# Patient Record
Sex: FEMALE | Race: WHITE | Employment: OTHER | URBAN - METROPOLITAN AREA
[De-identification: names, ages, dates, MRNs, and addresses within clinical notes are randomized per-mention and may not be internally consistent; named-entity substitution may affect disease eponyms.]

---

## 2017-02-16 ENCOUNTER — TELEPHONE (OUTPATIENT)
Dept: CARDIOLOGY CLINIC | Age: 77
End: 2017-02-16

## 2017-05-04 ENCOUNTER — NURSE ONLY (OUTPATIENT)
Dept: CARDIOLOGY CLINIC | Age: 77
End: 2017-05-04

## 2017-05-04 ENCOUNTER — OFFICE VISIT (OUTPATIENT)
Dept: CARDIOLOGY CLINIC | Age: 77
End: 2017-05-04

## 2017-05-04 ENCOUNTER — PROCEDURE VISIT (OUTPATIENT)
Dept: CARDIOLOGY CLINIC | Age: 77
End: 2017-05-04

## 2017-05-04 VITALS
HEIGHT: 63 IN | SYSTOLIC BLOOD PRESSURE: 128 MMHG | WEIGHT: 197 LBS | DIASTOLIC BLOOD PRESSURE: 78 MMHG | HEART RATE: 80 BPM | BODY MASS INDEX: 34.91 KG/M2 | RESPIRATION RATE: 16 BRPM

## 2017-05-04 DIAGNOSIS — Z45.09 ENCOUNTER FOR LOOP RECORDER CHECK: ICD-10-CM

## 2017-05-04 DIAGNOSIS — I63.9 ISCHEMIC STROKE (HCC): Primary | ICD-10-CM

## 2017-05-04 DIAGNOSIS — G45.9 TRANSIENT CEREBRAL ISCHEMIA, UNSPECIFIED TYPE: Primary | ICD-10-CM

## 2017-05-04 DIAGNOSIS — I63.9 ISCHEMIC STROKE (HCC): ICD-10-CM

## 2017-05-04 PROCEDURE — 99215 OFFICE O/P EST HI 40 MIN: CPT | Performed by: INTERNAL MEDICINE

## 2017-05-04 PROCEDURE — 93291 INTERROG DEV EVAL SCRMS IP: CPT | Performed by: INTERNAL MEDICINE

## 2017-05-24 ENCOUNTER — TELEPHONE (OUTPATIENT)
Dept: CARDIOLOGY CLINIC | Age: 77
End: 2017-05-24

## 2017-05-26 ENCOUNTER — HOSPITAL ENCOUNTER (OUTPATIENT)
Dept: CARDIAC CATH/INVASIVE PROCEDURES | Age: 77
Discharge: HOME | End: 2017-05-26
Attending: INTERNAL MEDICINE | Admitting: INTERNAL MEDICINE

## 2017-05-26 VITALS — WEIGHT: 194 LBS | BODY MASS INDEX: 32.32 KG/M2 | HEIGHT: 65 IN

## 2017-05-26 LAB
BASOPHILS ABSOLUTE: 0.1 K/UL (ref 0–0.2)
BASOPHILS RELATIVE PERCENT: 1.1 %
EOSINOPHILS ABSOLUTE: 0.3 K/UL (ref 0–0.6)
EOSINOPHILS RELATIVE PERCENT: 3.2 %
HCT VFR BLD CALC: 39.6 % (ref 36–48)
HEMOGLOBIN: 13.2 G/DL (ref 12–16)
LYMPHOCYTES ABSOLUTE: 1.7 K/UL (ref 1–5.1)
LYMPHOCYTES RELATIVE PERCENT: 18.9 %
MCH RBC QN AUTO: 29.8 PG (ref 26–34)
MCHC RBC AUTO-ENTMCNC: 33.3 G/DL (ref 31–36)
MCV RBC AUTO: 89.4 FL (ref 80–100)
MONOCYTES ABSOLUTE: 0.9 K/UL (ref 0–1.3)
MONOCYTES RELATIVE PERCENT: 9.6 %
NEUTROPHILS ABSOLUTE: 6 K/UL (ref 1.7–7.7)
NEUTROPHILS RELATIVE PERCENT: 67.2 %
PDW BLD-RTO: 13.5 % (ref 12.4–15.4)
PLATELET # BLD: 181 K/UL (ref 135–450)
PMV BLD AUTO: 9.5 FL (ref 5–10.5)
RBC # BLD: 4.43 M/UL (ref 4–5.2)
WBC # BLD: 8.9 K/UL (ref 4–11)

## 2017-05-26 PROCEDURE — 33284 PR RMVL IMPLANTABLE PT-ACTIVATED CAR EVENT RECORDER: CPT | Performed by: INTERNAL MEDICINE

## 2017-06-05 ENCOUNTER — TELEPHONE (OUTPATIENT)
Dept: CARDIOLOGY CLINIC | Age: 77
End: 2017-06-05

## 2017-06-07 ENCOUNTER — OFFICE VISIT (OUTPATIENT)
Dept: CARDIOLOGY CLINIC | Age: 77
End: 2017-06-07

## 2017-06-07 VITALS
WEIGHT: 197.4 LBS | BODY MASS INDEX: 32.85 KG/M2 | SYSTOLIC BLOOD PRESSURE: 126 MMHG | DIASTOLIC BLOOD PRESSURE: 72 MMHG | HEART RATE: 90 BPM

## 2017-06-07 DIAGNOSIS — I48.92 ATRIAL FLUTTER, UNSPECIFIED TYPE (HCC): ICD-10-CM

## 2017-06-07 DIAGNOSIS — I63.9 ISCHEMIC STROKE (HCC): Primary | ICD-10-CM

## 2017-06-07 DIAGNOSIS — Z95.818 STATUS POST PLACEMENT OF IMPLANTABLE LOOP RECORDER: ICD-10-CM

## 2017-06-07 PROCEDURE — 99213 OFFICE O/P EST LOW 20 MIN: CPT | Performed by: NURSE PRACTITIONER

## 2017-06-07 PROCEDURE — 93000 ELECTROCARDIOGRAM COMPLETE: CPT | Performed by: NURSE PRACTITIONER

## 2017-11-20 ENCOUNTER — TELEPHONE (OUTPATIENT)
Dept: PHARMACY | Facility: CLINIC | Age: 77
End: 2017-11-20

## 2017-11-22 ENCOUNTER — TELEPHONE (OUTPATIENT)
Dept: PHARMACY | Facility: CLINIC | Age: 77
End: 2017-11-22

## 2017-11-27 ENCOUNTER — TELEPHONE (OUTPATIENT)
Dept: PHARMACY | Facility: CLINIC | Age: 77
End: 2017-11-27

## 2017-12-04 ENCOUNTER — ANTI-COAG VISIT (OUTPATIENT)
Dept: PHARMACY | Facility: CLINIC | Age: 77
End: 2017-12-04

## 2017-12-04 DIAGNOSIS — I48.0 PAROXYSMAL ATRIAL FIBRILLATION (HCC): ICD-10-CM

## 2017-12-04 PROBLEM — I48.91 ATRIAL FIBRILLATION (HCC): Status: ACTIVE | Noted: 2017-12-04

## 2017-12-04 LAB — INR BLD: 1

## 2017-12-04 RX ORDER — CHLORAL HYDRATE 500 MG
3000 CAPSULE ORAL 2 TIMES DAILY
COMMUNITY
End: 2018-06-18 | Stop reason: RX

## 2017-12-04 RX ORDER — ASPIRIN 81 MG/1
81 TABLET ORAL DAILY
COMMUNITY
End: 2018-07-15 | Stop reason: ALTCHOICE

## 2017-12-04 RX ORDER — WARFARIN SODIUM 5 MG/1
5 TABLET ORAL DAILY
COMMUNITY
End: 2020-09-25

## 2017-12-04 RX ORDER — LANOLIN ALCOHOL/MO/W.PET/CERES
1000 CREAM (GRAM) TOPICAL DAILY
COMMUNITY

## 2017-12-04 RX ORDER — HYDROXYZINE HYDROCHLORIDE 25 MG/1
25 TABLET, FILM COATED ORAL NIGHTLY
COMMUNITY
End: 2018-06-18 | Stop reason: ALTCHOICE

## 2017-12-13 ENCOUNTER — ANTI-COAG VISIT (OUTPATIENT)
Dept: PHARMACY | Facility: CLINIC | Age: 77
End: 2017-12-13

## 2017-12-13 LAB — INR BLD: 1.2

## 2017-12-13 NOTE — PROGRESS NOTES
Ms. Cheyanne Xavier is here for management of anticoagulation for A-fib   PMH also significant for DM, HTN, CAD, thyroid disease, rheumatic fever as a child, multiple CVA and TIAs and a status post Loop implant 11/25/2015 to monitor for arrhythmias. She presents today w/out complaint. Pt verifies dosing regimen as listed above. Pt denies s/s bleeding/bruising/swelling/SOB. No BRBPR. No melena. Denies missed doses  Reviewed pt medication list.  No changes in RX/OTCs/Herbal medications. Reviewed dietary concerns  Denies EToH and tobacco use. INR only barely increased after large dose increase last visit. Will make another large dose increase again today. INR 1.2 is well below acceptable therapeutic range of 2-3. Recommend to increase to 7.5 mg daily. (50% inc.)  Patient has 5 mg tablets. Will continue to monitor and check INR in 7 days. Dosing reminder card given with phone number, appointment date and time.    Return to clinic: 12/20 @ 11:00am

## 2017-12-20 ENCOUNTER — ANTI-COAG VISIT (OUTPATIENT)
Dept: PHARMACY | Facility: CLINIC | Age: 77
End: 2017-12-20

## 2017-12-20 LAB — INR BLD: 2.4

## 2017-12-20 NOTE — PROGRESS NOTES
Ms. Annamaria Mancuso is here for management of anticoagulation for A-fib   PMH also significant for DM, HTN, CAD, thyroid disease, rheumatic fever as a child, multiple CVA and TIAs and a status post Loop implant 11/25/2015 to monitor for arrhythmias. She presents today w/out complaint. Pt verifies dosing regimen as listed above. Pt denies s/s bleeding/bruising/swelling/SOB. No BRBPR. No melena. Denies missed doses  Reviewed pt medication list.  No changes in RX/OTCs/Herbal medications. Reviewed dietary concerns  Denies EToH and tobacco use. INR finally in range after very large dose increase last week. Will recheck next week to ensure INR still in range. INR 2.4 is within acceptable therapeutic range of 2-3. Recommend to continue 7.5 mg daily. Patient has 5 mg tablets. Will continue to monitor and check INR in 7 days. Dosing reminder card given with phone number, appointment date and time.    Return to clinic: 12/27 @ 10:15am

## 2017-12-27 ENCOUNTER — ANTI-COAG VISIT (OUTPATIENT)
Dept: PHARMACY | Facility: CLINIC | Age: 77
End: 2017-12-27

## 2017-12-27 LAB — INR BLD: 2.6

## 2017-12-27 NOTE — PROGRESS NOTES
Ms. Antoine Garcia is here for management of anticoagulation for A-fib   PMH also significant for DM, HTN, CAD, thyroid disease, rheumatic fever as a child, multiple CVA and TIAs and a status post Loop implant 11/25/2015 to monitor for arrhythmias. She presents today w/out complaint. Pt verifies dosing regimen as listed above. Pt denies s/s bleeding/bruising/swelling/SOB. No BRBPR. No melena. Denies missed doses  Reviewed pt medication list.  No changes in RX/OTCs/Herbal medications. Reviewed dietary concerns  Denies EToH and tobacco use. INR 2.6 is within acceptable therapeutic range of 2-3. Recommend to continue 7.5 mg daily. Patient has 5 mg tablets. Will continue to monitor and check INR in 2 weeks. Dosing reminder card given with phone number, appointment date and time.    Return to clinic: 1/10 @ 11:00am

## 2018-01-10 ENCOUNTER — ANTI-COAG VISIT (OUTPATIENT)
Dept: PHARMACY | Facility: CLINIC | Age: 78
End: 2018-01-10

## 2018-01-10 DIAGNOSIS — I48.0 PAROXYSMAL ATRIAL FIBRILLATION (HCC): ICD-10-CM

## 2018-01-10 LAB — INR BLD: 4.2

## 2018-01-10 NOTE — PROGRESS NOTES
Ms. Kathia Lopez is here for management of anticoagulation for A-fib   PMH also significant for DM, HTN, CAD, thyroid disease, rheumatic fever as a child, multiple CVA and TIAs and a status post Loop implant 11/25/2015 to monitor for arrhythmias. She presents today w/out complaint. Pt verifies dosing regimen as listed above. Pt denies s/s bleeding/swelling/SOB. No BRBPR. No melena. Denies missed doses  Reviewed pt medication list.  No changes in /OTCs/Herbal medications. Reviewed dietary concerns  Denies EToH and tobacco use. Complaint today of bruising at insulin injection site. Could be related to high INR today. Recently started on hydroxyzine for itching. Should not interact. Plans to start probiotic. Discussed diet. Has been avoiding greens since starting warfarin. Advised that she could start adding in greens again and discussed consistency of diet. INR 4.2 is above acceptable therapeutic range of 2-3. Recommend to hold dose today, then reduce to 7.5 mg daily except 5 mg on Mon/Fri. Patient has 5 mg tablets. Will continue to monitor and check INR in 1 weeks. Dosing reminder card given with phone number, appointment date and time.    Return to clinic: 1/17 @ 9:45 am    Shira Álvarez PharmD 10:05 AM 1/10/18

## 2018-01-22 ENCOUNTER — ANTI-COAG VISIT (OUTPATIENT)
Dept: PHARMACY | Facility: CLINIC | Age: 78
End: 2018-01-22

## 2018-01-22 LAB — INR BLD: 2.3

## 2018-02-12 ENCOUNTER — ANTI-COAG VISIT (OUTPATIENT)
Dept: PHARMACY | Facility: CLINIC | Age: 78
End: 2018-02-12

## 2018-02-12 LAB — INR BLD: 1.7

## 2018-02-26 ENCOUNTER — ANTI-COAG VISIT (OUTPATIENT)
Dept: PHARMACY | Facility: CLINIC | Age: 78
End: 2018-02-26

## 2018-02-26 LAB — INR BLD: 2.8

## 2018-02-26 NOTE — PROGRESS NOTES
Ms. Ed Whyte is here for management of anticoagulation for A-fib   PMH also significant for DM, HTN, CAD, thyroid disease, rheumatic fever as a child, multiple CVA and TIAs and a status post Loop implant 11/25/2015 to monitor for arrhythmias. She presents today w/out complaint. Pt verifies dosing regimen as listed above. Pt denies s/s bleeding/swelling/SOB. No BRBPR. No melena. Denies missed doses  Reviewed pt medication list.  No changes in /OTCs/Herbal medications. Reviewed dietary concerns  Denies EToH and tobacco use. INR 2.8 is within acceptable therapeutic range of 2-3. Recommend to continue 7.5 mg daily except 5 mg on Fridays. Patient has 5 mg tablets. Will continue to monitor and check INR in 2 weeks. Dosing reminder card given with phone number, appointment date and time.    Return to clinic: 3/12 @ 10:15am

## 2018-03-12 ENCOUNTER — ANTI-COAG VISIT (OUTPATIENT)
Dept: PHARMACY | Facility: CLINIC | Age: 78
End: 2018-03-12

## 2018-03-12 LAB — INR BLD: 1.8

## 2018-03-26 ENCOUNTER — ANTI-COAG VISIT (OUTPATIENT)
Dept: PHARMACY | Facility: CLINIC | Age: 78
End: 2018-03-26

## 2018-03-26 LAB — INR BLD: 1.6

## 2018-04-09 ENCOUNTER — ANTI-COAG VISIT (OUTPATIENT)
Dept: PHARMACY | Facility: CLINIC | Age: 78
End: 2018-04-09

## 2018-04-09 LAB — INR BLD: 2.5

## 2018-05-11 ENCOUNTER — OFFICE VISIT (OUTPATIENT)
Dept: CARDIOLOGY CLINIC | Age: 78
End: 2018-05-11

## 2018-05-11 VITALS
BODY MASS INDEX: 34.49 KG/M2 | DIASTOLIC BLOOD PRESSURE: 80 MMHG | OXYGEN SATURATION: 96 % | SYSTOLIC BLOOD PRESSURE: 140 MMHG | HEIGHT: 65 IN | HEART RATE: 74 BPM | WEIGHT: 207 LBS

## 2018-05-11 DIAGNOSIS — I48.0 PAROXYSMAL ATRIAL FIBRILLATION (HCC): Primary | ICD-10-CM

## 2018-05-11 PROCEDURE — 93000 ELECTROCARDIOGRAM COMPLETE: CPT | Performed by: INTERNAL MEDICINE

## 2018-05-11 PROCEDURE — 99244 OFF/OP CNSLTJ NEW/EST MOD 40: CPT | Performed by: INTERNAL MEDICINE

## 2018-05-11 RX ORDER — PANTOPRAZOLE SODIUM 40 MG/1
40 TABLET, DELAYED RELEASE ORAL DAILY
Qty: 30 TABLET | Refills: 3 | Status: SHIPPED | OUTPATIENT
Start: 2018-05-11 | End: 2019-01-08

## 2018-05-14 ENCOUNTER — ANTI-COAG VISIT (OUTPATIENT)
Dept: PHARMACY | Facility: CLINIC | Age: 78
End: 2018-05-14

## 2018-05-14 DIAGNOSIS — I48.0 PAROXYSMAL ATRIAL FIBRILLATION (HCC): ICD-10-CM

## 2018-05-14 LAB — INR BLD: 4.6

## 2018-05-18 ENCOUNTER — TELEPHONE (OUTPATIENT)
Dept: CARDIOLOGY CLINIC | Age: 78
End: 2018-05-18

## 2018-05-21 ENCOUNTER — TELEPHONE (OUTPATIENT)
Dept: CARDIOLOGY CLINIC | Age: 78
End: 2018-05-21

## 2018-06-04 PROBLEM — I48.91 ATRIAL FIBRILLATION (HCC): Status: RESOLVED | Noted: 2017-12-04 | Resolved: 2018-06-04

## 2018-06-18 ENCOUNTER — OFFICE VISIT (OUTPATIENT)
Dept: CARDIOLOGY CLINIC | Age: 78
End: 2018-06-18

## 2018-06-18 VITALS
SYSTOLIC BLOOD PRESSURE: 142 MMHG | DIASTOLIC BLOOD PRESSURE: 72 MMHG | OXYGEN SATURATION: 96 % | HEART RATE: 70 BPM | HEIGHT: 64 IN | BODY MASS INDEX: 34.15 KG/M2 | WEIGHT: 200 LBS

## 2018-06-18 DIAGNOSIS — R07.89 OTHER CHEST PAIN: Primary | ICD-10-CM

## 2018-06-18 DIAGNOSIS — I48.0 PAROXYSMAL ATRIAL FIBRILLATION (HCC): ICD-10-CM

## 2018-06-18 DIAGNOSIS — I10 ESSENTIAL HYPERTENSION: ICD-10-CM

## 2018-06-18 DIAGNOSIS — I25.10 CORONARY ARTERY DISEASE INVOLVING NATIVE CORONARY ARTERY OF NATIVE HEART WITHOUT ANGINA PECTORIS: ICD-10-CM

## 2018-06-18 DIAGNOSIS — E78.2 MIXED HYPERLIPIDEMIA: ICD-10-CM

## 2018-06-18 PROCEDURE — 99214 OFFICE O/P EST MOD 30 MIN: CPT | Performed by: NURSE PRACTITIONER

## 2018-06-18 RX ORDER — AMOXICILLIN 500 MG
1 CAPSULE ORAL 2 TIMES DAILY
COMMUNITY
End: 2021-01-19

## 2018-06-18 RX ORDER — MULTIVITAMIN WITH IRON
250 TABLET ORAL DAILY
COMMUNITY

## 2018-06-18 RX ORDER — LISINOPRIL 40 MG/1
40 TABLET ORAL DAILY
COMMUNITY

## 2018-06-21 DIAGNOSIS — E78.5 HYPERLIPIDEMIA, UNSPECIFIED HYPERLIPIDEMIA TYPE: Primary | ICD-10-CM

## 2018-06-21 RX ORDER — ROSUVASTATIN CALCIUM 10 MG/1
10 TABLET, COATED ORAL DAILY
Qty: 30 TABLET | Refills: 3 | Status: SHIPPED | OUTPATIENT
Start: 2018-06-21 | End: 2018-07-02 | Stop reason: SDUPTHER

## 2018-06-28 RX ORDER — PANTOPRAZOLE SODIUM 40 MG/1
40 TABLET, DELAYED RELEASE ORAL DAILY
Qty: 90 TABLET | Refills: 2 | OUTPATIENT
Start: 2018-06-28

## 2018-06-28 NOTE — TELEPHONE ENCOUNTER
Patient would like a 90 day supply  [employer] mail order  This was Rx'd on the 5/11/18 visit by Dr. Chun Moreland

## 2018-07-02 RX ORDER — ROSUVASTATIN CALCIUM 10 MG/1
10 TABLET, COATED ORAL DAILY
Qty: 90 TABLET | Refills: 3 | Status: SHIPPED | OUTPATIENT
Start: 2018-07-02 | End: 2018-07-24 | Stop reason: SDUPTHER

## 2018-07-15 ENCOUNTER — HOSPITAL ENCOUNTER (EMERGENCY)
Age: 78
Discharge: HOME | End: 2018-07-15
Attending: EMERGENCY MEDICINE
Payer: MEDICARE

## 2018-07-15 ENCOUNTER — APPOINTMENT (OUTPATIENT)
Dept: GENERAL RADIOLOGY | Age: 78
End: 2018-07-15
Payer: MEDICARE

## 2018-07-15 VITALS
SYSTOLIC BLOOD PRESSURE: 176 MMHG | WEIGHT: 200 LBS | DIASTOLIC BLOOD PRESSURE: 85 MMHG | RESPIRATION RATE: 20 BRPM | TEMPERATURE: 98.9 F | HEIGHT: 64 IN | OXYGEN SATURATION: 97 % | BODY MASS INDEX: 34.15 KG/M2 | HEART RATE: 92 BPM

## 2018-07-15 DIAGNOSIS — I10 HYPERTENSION, UNSPECIFIED TYPE: Primary | ICD-10-CM

## 2018-07-15 LAB
A/G RATIO: 1.3 (ref 1.1–2.2)
ALBUMIN SERPL-MCNC: 4.1 G/DL (ref 3.4–5)
ALP BLD-CCNC: 75 U/L (ref 40–129)
ALT SERPL-CCNC: 30 U/L (ref 10–40)
ANION GAP SERPL CALCULATED.3IONS-SCNC: 13 MMOL/L (ref 3–16)
AST SERPL-CCNC: 19 U/L (ref 15–37)
BASOPHILS ABSOLUTE: 0 K/UL (ref 0–0.2)
BASOPHILS RELATIVE PERCENT: 0.5 %
BILIRUB SERPL-MCNC: 0.4 MG/DL (ref 0–1)
BILIRUBIN URINE: NEGATIVE
BLOOD, URINE: NEGATIVE
BUN BLDV-MCNC: 12 MG/DL (ref 7–20)
CALCIUM SERPL-MCNC: 8.9 MG/DL (ref 8.3–10.6)
CHLORIDE BLD-SCNC: 106 MMOL/L (ref 99–110)
CLARITY: CLEAR
CO2: 25 MMOL/L (ref 21–32)
COLOR: YELLOW
CREAT SERPL-MCNC: 0.7 MG/DL (ref 0.6–1.2)
EOSINOPHILS ABSOLUTE: 0.4 K/UL (ref 0–0.6)
EOSINOPHILS RELATIVE PERCENT: 5.1 %
EPITHELIAL CELLS, UA: NORMAL /HPF
GFR AFRICAN AMERICAN: >60
GFR NON-AFRICAN AMERICAN: >60
GLOBULIN: 3.2 G/DL
GLUCOSE BLD-MCNC: 207 MG/DL (ref 70–99)
GLUCOSE URINE: 500 MG/DL
HCT VFR BLD CALC: 42.3 % (ref 36–48)
HEMOGLOBIN: 13.9 G/DL (ref 12–16)
KETONES, URINE: 15 MG/DL
LEUKOCYTE ESTERASE, URINE: NEGATIVE
LYMPHOCYTES ABSOLUTE: 1.9 K/UL (ref 1–5.1)
LYMPHOCYTES RELATIVE PERCENT: 22.2 %
MCH RBC QN AUTO: 29 PG (ref 26–34)
MCHC RBC AUTO-ENTMCNC: 32.8 G/DL (ref 31–36)
MCV RBC AUTO: 88.4 FL (ref 80–100)
MICROSCOPIC EXAMINATION: YES
MONOCYTES ABSOLUTE: 0.6 K/UL (ref 0–1.3)
MONOCYTES RELATIVE PERCENT: 7.4 %
NEUTROPHILS ABSOLUTE: 5.6 K/UL (ref 1.7–7.7)
NEUTROPHILS RELATIVE PERCENT: 64.8 %
NITRITE, URINE: NEGATIVE
PDW BLD-RTO: 13.5 % (ref 12.4–15.4)
PH UA: 5
PLATELET # BLD: 187 K/UL (ref 135–450)
PMV BLD AUTO: 9.2 FL (ref 5–10.5)
POTASSIUM REFLEX MAGNESIUM: 3.8 MMOL/L (ref 3.5–5.1)
PROTEIN UA: ABNORMAL MG/DL
RBC # BLD: 4.78 M/UL (ref 4–5.2)
RBC UA: NORMAL /HPF (ref 0–2)
SODIUM BLD-SCNC: 144 MMOL/L (ref 136–145)
SPECIFIC GRAVITY UA: 1.02
TOTAL PROTEIN: 7.3 G/DL (ref 6.4–8.2)
TROPONIN: <0.01 NG/ML
URINE TYPE: ABNORMAL
UROBILINOGEN, URINE: 0.2 E.U./DL
WBC # BLD: 8.7 K/UL (ref 4–11)
WBC UA: NORMAL /HPF (ref 0–5)

## 2018-07-15 PROCEDURE — 81001 URINALYSIS AUTO W/SCOPE: CPT

## 2018-07-15 PROCEDURE — 93010 ELECTROCARDIOGRAM REPORT: CPT | Performed by: INTERNAL MEDICINE

## 2018-07-15 PROCEDURE — 85025 COMPLETE CBC W/AUTO DIFF WBC: CPT

## 2018-07-15 PROCEDURE — 96375 TX/PRO/DX INJ NEW DRUG ADDON: CPT

## 2018-07-15 PROCEDURE — 36415 COLL VENOUS BLD VENIPUNCTURE: CPT

## 2018-07-15 PROCEDURE — S0028 INJECTION, FAMOTIDINE, 20 MG: HCPCS | Performed by: EMERGENCY MEDICINE

## 2018-07-15 PROCEDURE — 6360000002 HC RX W HCPCS: Performed by: EMERGENCY MEDICINE

## 2018-07-15 PROCEDURE — 96374 THER/PROPH/DIAG INJ IV PUSH: CPT

## 2018-07-15 PROCEDURE — 71046 X-RAY EXAM CHEST 2 VIEWS: CPT

## 2018-07-15 PROCEDURE — 84484 ASSAY OF TROPONIN QUANT: CPT

## 2018-07-15 PROCEDURE — 93005 ELECTROCARDIOGRAM TRACING: CPT | Performed by: EMERGENCY MEDICINE

## 2018-07-15 PROCEDURE — 80053 COMPREHEN METABOLIC PANEL: CPT

## 2018-07-15 PROCEDURE — 2500000003 HC RX 250 WO HCPCS: Performed by: EMERGENCY MEDICINE

## 2018-07-15 PROCEDURE — 99284 EMERGENCY DEPT VISIT MOD MDM: CPT

## 2018-07-15 RX ORDER — HYDRALAZINE HYDROCHLORIDE 20 MG/ML
5 INJECTION INTRAMUSCULAR; INTRAVENOUS ONCE
Status: COMPLETED | OUTPATIENT
Start: 2018-07-15 | End: 2018-07-15

## 2018-07-15 RX ADMIN — FAMOTIDINE 20 MG: 10 INJECTION, SOLUTION INTRAVENOUS at 20:16

## 2018-07-15 RX ADMIN — HYDRALAZINE HYDROCHLORIDE 5 MG: 20 INJECTION INTRAMUSCULAR; INTRAVENOUS at 20:17

## 2018-07-15 ASSESSMENT — PAIN SCALES - GENERAL: PAINLEVEL_OUTOF10: 2

## 2018-07-15 ASSESSMENT — PAIN DESCRIPTION - PAIN TYPE: TYPE: ACUTE PAIN

## 2018-07-15 ASSESSMENT — PAIN DESCRIPTION - LOCATION: LOCATION: ABDOMEN

## 2018-07-15 ASSESSMENT — PAIN DESCRIPTION - ORIENTATION: ORIENTATION: UPPER

## 2018-07-15 NOTE — ED PROVIDER NOTES
Emergency Department Encounter  [address] [facility] EMERGENCY DEPARTMENT    Patient: Mahendra Steele  MRN: [id number]  : 1940  Date of Evaluation: 7/15/2018  ED Provider: Anuja Anderson DO    Chief Complaint       Chief Complaint   Patient presents with    Hypertension     reports high bp today. has not felt well for 3 days. States she is under alot of stress and her stomach has been upset x 3 days. pt alert oriented. no slurred speech no facial droop. has deficit on left side due previous cva 3 years ago. Rj Chahal is a 77 y.o. female who presents to the emergency department for chief complaint of hypertension. Patient has a history of hypertension, but states over the past few days she's noticed that it has been more increased than normal.  She has not missed any doses of her medications, but does not that she has been under a lot of stress. She feels generally unwell when her blood pressure was high but otherwise denies any specific symptoms such as fever, headache, cough, shortness breath, chest pain, nausea, vomiting, abdominal pain, changes in bowel movements or urinary symptoms. Of note patient does have a history of CVA in the past, and has left-sided deficits from this. Both her and her  state that nothing has changed with that she is at her baseline currently. ROS:     Review of Systems   All other systems reviewed and are negative.       Past History     Past Medical History:   Diagnosis Date    Atrial fibrillation ([address]     Cerebral artery occlusion with cerebral infarction ([address]     left sided weakness-left arm and left leg, also left eye    Diabetes mellitus ([address]     Fractures     Hypertension     Thyroid disease      Past Surgical History:   Procedure Laterality Date    ANKLE FRACTURE SURGERY  2019    left ankle    ANKLE SURGERY Left 2014    Removal painful hardware    CHOLECYSTECTOMY      CYST REMOVAL      chest x2, back x1    HAND SURGERY      right    Urine Type Not Specified    CBC Auto Differential   Result Value Ref Range    WBC 8.7 4.0 - 11.0 K/uL    RBC 4.78 4.00 - 5.20 M/uL    Hemoglobin 13.9 12.0 - 16.0 g/dL    Hematocrit 42.3 36.0 - 48.0 %    MCV 88.4 80.0 - 100.0 fL    MCH 29.0 26.0 - 34.0 pg    MCHC 32.8 31.0 - 36.0 g/dL    RDW 13.5 12.4 - 15.4 %    Platelets 404 397 - 756 K/uL    MPV 9.2 5.0 - 10.5 fL    Neutrophils % 64.8 %    Lymphocytes % 22.2 %    Monocytes % 7.4 %    Eosinophils % 5.1 %    Basophils % 0.5 %    Neutrophils # 5.6 1.7 - 7.7 K/uL    Lymphocytes # 1.9 1.0 - 5.1 K/uL    Monocytes # 0.6 0.0 - 1.3 K/uL    Eosinophils # 0.4 0.0 - 0.6 K/uL    Basophils # 0.0 0.0 - 0.2 K/uL   Comprehensive Metabolic Panel w/ Reflex to MG   Result Value Ref Range    Sodium 144 136 - 145 mmol/L    Potassium reflex Magnesium 3.8 3.5 - 5.1 mmol/L    Chloride 106 99 - 110 mmol/L    CO2 25 21 - 32 mmol/L    Anion Gap 13 3 - 16    Glucose 207 (H) 70 - 99 mg/dL    BUN 12 7 - 20 mg/dL    CREATININE 0.7 0.6 - 1.2 mg/dL    GFR Non-African American >60 >60    GFR African American >60 >60    Calcium 8.9 8.3 - 10.6 mg/dL    Total Protein 7.3 6.4 - 8.2 g/dL    Alb 4.1 3.4 - 5.0 g/dL    Albumin/Globulin Ratio 1.3 1.1 - 2.2    Total Bilirubin 0.4 0.0 - 1.0 mg/dL    Alkaline Phosphatase 75 40 - 129 U/L    ALT 30 10 - 40 U/L    AST 19 15 - 37 U/L    Globulin 3.2 g/dL   Troponin   Result Value Ref Range    Troponin <0.01 <0.01 ng/mL   Microscopic Urinalysis   Result Value Ref Range    WBC, UA 3-5 0 - 5 /HPF    RBC, UA 0-2 0 - 2 /HPF    Epi Cells 0-2 /HPF   EKG 12 lead   Result Value Ref Range    Ventricular Rate 90 BPM    Atrial Rate 90 BPM    P-R Interval 188 ms    QRS Duration 74 ms    Q-T Interval 426 ms    QTc Calculation (Bazett) 521 ms    P Axis 64 degrees    R Axis -15 degrees    T Axis 86 degrees    Diagnosis       Sinus rhythm with occasional Premature ventricular complexesLow voltage QRSCannot rule out Anterior infarct (cited on or before 21-NOV-2015)Prolonged concerns or questions. ED Medication Orders     Start Ordered     Status Ordering Provider    07/15/18 2000 07/15/18 1930  hydrALAZINE (APRESOLINE) injection 5 mg  ONCE      Ordered Catina LOCK    07/15/18 2000 07/15/18 1930  famotidine (PEPCID) injection 20 mg  ONCE      Ordered Francisco Hoover          Final Impression      1.  Hypertension, unspecified type      DISPOSITION    (Please note that portions of this note may have been completed with a voice recognition program. Efforts were made to edit the dictations but occasionally words are mis-transcribed.)    Selwyn Sheth,   [place] [address] [place] [address], DO  07/15/18 3972

## 2018-07-17 LAB
EKG ATRIAL RATE: 90 BPM
EKG DIAGNOSIS: NORMAL
EKG P AXIS: 64 DEGREES
EKG P-R INTERVAL: 188 MS
EKG Q-T INTERVAL: 426 MS
EKG QRS DURATION: 74 MS
EKG QTC CALCULATION (BAZETT): 521 MS
EKG R AXIS: -15 DEGREES
EKG T AXIS: 86 DEGREES
EKG VENTRICULAR RATE: 90 BPM

## 2018-07-20 ENCOUNTER — TELEPHONE (OUTPATIENT)
Dept: CARDIOLOGY CLINIC | Age: 78
End: 2018-07-20

## 2018-07-20 NOTE — TELEPHONE ENCOUNTER
Refill request on rosuvastatin (CRESTOR) 10 MG tablet. Pt requesting 90 day supplies.     Send to [facility] pharmacy ph. [phone], fax [phone]

## 2018-07-23 ENCOUNTER — HOSPITAL ENCOUNTER (OUTPATIENT)
Age: 78
Discharge: HOME | End: 2018-07-23
Payer: MEDICARE

## 2018-07-23 DIAGNOSIS — E78.5 HYPERLIPIDEMIA, UNSPECIFIED HYPERLIPIDEMIA TYPE: ICD-10-CM

## 2018-07-23 LAB
ALBUMIN SERPL-MCNC: 4.1 G/DL (ref 3.4–5)
ALP BLD-CCNC: 75 U/L (ref 40–129)
ALT SERPL-CCNC: 30 U/L (ref 10–40)
AST SERPL-CCNC: 21 U/L (ref 15–37)
BILIRUB SERPL-MCNC: 0.7 MG/DL (ref 0–1)
BILIRUBIN DIRECT: <0.2 MG/DL (ref 0–0.3)
BILIRUBIN, INDIRECT: NORMAL MG/DL (ref 0–1)
CHOLESTEROL, TOTAL: 132 MG/DL (ref 0–199)
HDLC SERPL-MCNC: 47 MG/DL (ref 40–60)
LDL CHOLESTEROL CALCULATED: 60 MG/DL
TOTAL PROTEIN: 7.3 G/DL (ref 6.4–8.2)
TRIGL SERPL-MCNC: 125 MG/DL (ref 0–150)
VLDLC SERPL CALC-MCNC: 25 MG/DL

## 2018-07-23 PROCEDURE — 36415 COLL VENOUS BLD VENIPUNCTURE: CPT

## 2018-07-23 PROCEDURE — 80061 LIPID PANEL: CPT

## 2018-07-23 PROCEDURE — 80076 HEPATIC FUNCTION PANEL: CPT

## 2018-07-24 ENCOUNTER — TELEPHONE (OUTPATIENT)
Dept: CARDIOLOGY CLINIC | Age: 78
End: 2018-07-24

## 2018-07-24 NOTE — TELEPHONE ENCOUNTER
----- Message from ANANT Aguilar CNP sent at 7/24/2018  8:21 AM EDT -----  Levels look good; recheck in one year

## 2018-07-25 RX ORDER — ROSUVASTATIN CALCIUM 10 MG/1
10 TABLET, COATED ORAL DAILY
Qty: 90 TABLET | Refills: 3 | Status: SHIPPED | OUTPATIENT
Start: 2018-07-25

## 2018-08-14 ENCOUNTER — TELEPHONE (OUTPATIENT)
Dept: CARDIOLOGY CLINIC | Age: 78
End: 2018-08-14

## 2018-08-14 NOTE — TELEPHONE ENCOUNTER
Spoke with pt. She started with muscle aches and pain 2 days ago. Same pain as when she tried statins a while ago. Please advise.

## 2018-08-14 NOTE — TELEPHONE ENCOUNTER
Pt sts RPS started her on rosuvastatin (CRESTOR) 10 MG tablet, now pt is having pain in shoulders and neck. Last time pt was on chol meds it caused pain.

## 2019-01-08 ENCOUNTER — APPOINTMENT (OUTPATIENT)
Dept: GENERAL RADIOLOGY | Age: 79
End: 2019-01-08
Payer: MEDICARE

## 2019-01-08 ENCOUNTER — HOSPITAL ENCOUNTER (EMERGENCY)
Age: 79
Discharge: HOME | End: 2019-01-08
Attending: EMERGENCY MEDICINE
Payer: MEDICARE

## 2019-01-08 VITALS
OXYGEN SATURATION: 94 % | SYSTOLIC BLOOD PRESSURE: 142 MMHG | BODY MASS INDEX: 32.61 KG/M2 | HEART RATE: 94 BPM | HEIGHT: 64 IN | DIASTOLIC BLOOD PRESSURE: 72 MMHG | TEMPERATURE: 97 F | RESPIRATION RATE: 20 BRPM | WEIGHT: 191 LBS

## 2019-01-08 DIAGNOSIS — R53.1 GENERALIZED WEAKNESS: Primary | ICD-10-CM

## 2019-01-08 LAB
A/G RATIO: 1.3 (ref 1.1–2.2)
ALBUMIN SERPL-MCNC: 4.1 G/DL (ref 3.4–5)
ALP BLD-CCNC: 75 U/L (ref 40–129)
ALT SERPL-CCNC: 23 U/L (ref 10–40)
ANION GAP SERPL CALCULATED.3IONS-SCNC: 13 MMOL/L (ref 3–16)
AST SERPL-CCNC: 20 U/L (ref 15–37)
BASOPHILS ABSOLUTE: 0.1 K/UL (ref 0–0.2)
BASOPHILS RELATIVE PERCENT: 1 %
BILIRUB SERPL-MCNC: 0.3 MG/DL (ref 0–1)
BILIRUBIN URINE: NEGATIVE
BLOOD, URINE: NEGATIVE
BUN BLDV-MCNC: 17 MG/DL (ref 7–20)
CALCIUM SERPL-MCNC: 9 MG/DL (ref 8.3–10.6)
CHLORIDE BLD-SCNC: 101 MMOL/L (ref 99–110)
CLARITY: CLEAR
CO2: 23 MMOL/L (ref 21–32)
COLOR: YELLOW
CREAT SERPL-MCNC: 0.7 MG/DL (ref 0.6–1.2)
EKG ATRIAL RATE: 91 BPM
EKG DIAGNOSIS: NORMAL
EKG P AXIS: 24 DEGREES
EKG P-R INTERVAL: 188 MS
EKG Q-T INTERVAL: 394 MS
EKG QRS DURATION: 82 MS
EKG QTC CALCULATION (BAZETT): 484 MS
EKG R AXIS: -18 DEGREES
EKG T AXIS: -4 DEGREES
EKG VENTRICULAR RATE: 91 BPM
EOSINOPHILS ABSOLUTE: 0.3 K/UL (ref 0–0.6)
EOSINOPHILS RELATIVE PERCENT: 5.2 %
GFR AFRICAN AMERICAN: >60
GFR NON-AFRICAN AMERICAN: >60
GLOBULIN: 3.1 G/DL
GLUCOSE BLD-MCNC: 182 MG/DL (ref 70–99)
GLUCOSE BLD-MCNC: 285 MG/DL (ref 70–99)
GLUCOSE URINE: >=1000 MG/DL
HCT VFR BLD CALC: 43.9 % (ref 36–48)
HEMOGLOBIN: 14.7 G/DL (ref 12–16)
KETONES, URINE: ABNORMAL MG/DL
LEUKOCYTE ESTERASE, URINE: NEGATIVE
LYMPHOCYTES ABSOLUTE: 1.7 K/UL (ref 1–5.1)
LYMPHOCYTES RELATIVE PERCENT: 27 %
MCH RBC QN AUTO: 30 PG (ref 26–34)
MCHC RBC AUTO-ENTMCNC: 33.6 G/DL (ref 31–36)
MCV RBC AUTO: 89.3 FL (ref 80–100)
MICROSCOPIC EXAMINATION: ABNORMAL
MONOCYTES ABSOLUTE: 0.4 K/UL (ref 0–1.3)
MONOCYTES RELATIVE PERCENT: 7.2 %
NEUTROPHILS ABSOLUTE: 3.6 K/UL (ref 1.7–7.7)
NEUTROPHILS RELATIVE PERCENT: 59.6 %
NITRITE, URINE: NEGATIVE
PDW BLD-RTO: 13.8 % (ref 12.4–15.4)
PERFORMED ON: ABNORMAL
PH UA: 5.5
PLATELET # BLD: 192 K/UL (ref 135–450)
PMV BLD AUTO: 9.4 FL (ref 5–10.5)
POTASSIUM SERPL-SCNC: 4.6 MMOL/L (ref 3.5–5.1)
PRO-BNP: 33 PG/ML (ref 0–449)
PROTEIN UA: NEGATIVE MG/DL
RBC # BLD: 4.92 M/UL (ref 4–5.2)
SODIUM BLD-SCNC: 137 MMOL/L (ref 136–145)
SPECIFIC GRAVITY UA: 1.02
TOTAL PROTEIN: 7.2 G/DL (ref 6.4–8.2)
TROPONIN: <0.01 NG/ML
URINE TYPE: ABNORMAL
UROBILINOGEN, URINE: 0.2 E.U./DL
WBC # BLD: 6.1 K/UL (ref 4–11)

## 2019-01-08 PROCEDURE — 2580000003 HC RX 258: Performed by: NURSE PRACTITIONER

## 2019-01-08 PROCEDURE — 93005 ELECTROCARDIOGRAM TRACING: CPT | Performed by: NURSE PRACTITIONER

## 2019-01-08 PROCEDURE — 93010 ELECTROCARDIOGRAM REPORT: CPT | Performed by: INTERNAL MEDICINE

## 2019-01-08 PROCEDURE — 96361 HYDRATE IV INFUSION ADD-ON: CPT

## 2019-01-08 PROCEDURE — 84484 ASSAY OF TROPONIN QUANT: CPT

## 2019-01-08 PROCEDURE — 83880 ASSAY OF NATRIURETIC PEPTIDE: CPT

## 2019-01-08 PROCEDURE — 71046 X-RAY EXAM CHEST 2 VIEWS: CPT

## 2019-01-08 PROCEDURE — 80053 COMPREHEN METABOLIC PANEL: CPT

## 2019-01-08 PROCEDURE — 96374 THER/PROPH/DIAG INJ IV PUSH: CPT

## 2019-01-08 PROCEDURE — 85025 COMPLETE CBC W/AUTO DIFF WBC: CPT

## 2019-01-08 PROCEDURE — 81003 URINALYSIS AUTO W/O SCOPE: CPT

## 2019-01-08 PROCEDURE — 99284 EMERGENCY DEPT VISIT MOD MDM: CPT

## 2019-01-08 PROCEDURE — 6370000000 HC RX 637 (ALT 250 FOR IP): Performed by: NURSE PRACTITIONER

## 2019-01-08 RX ORDER — 0.9 % SODIUM CHLORIDE 0.9 %
1000 INTRAVENOUS SOLUTION INTRAVENOUS ONCE
Status: COMPLETED | OUTPATIENT
Start: 2019-01-08 | End: 2019-01-08

## 2019-01-08 RX ADMIN — SODIUM CHLORIDE 1000 ML: 9 INJECTION, SOLUTION INTRAVENOUS at 13:04

## 2019-01-08 RX ADMIN — INSULIN HUMAN 4 UNITS: 100 INJECTION, SOLUTION PARENTERAL at 13:05

## 2019-01-08 ASSESSMENT — ENCOUNTER SYMPTOMS
RHINORRHEA: 0
ABDOMINAL PAIN: 0
SHORTNESS OF BREATH: 1
COLOR CHANGE: 0
NAUSEA: 0
SINUS PAIN: 0
SORE THROAT: 0
COUGH: 0
CONSTIPATION: 0
CHEST TIGHTNESS: 0
DIARRHEA: 0
BACK PAIN: 0
SINUS PRESSURE: 0
VOMITING: 0

## 2019-05-31 ENCOUNTER — TELEPHONE (OUTPATIENT)
Dept: CARDIOLOGY CLINIC | Age: 79
End: 2019-05-31

## 2019-05-31 RX ORDER — ROSUVASTATIN CALCIUM 10 MG/1
10 TABLET, COATED ORAL DAILY
Qty: 15 TABLET | Refills: 0 | Status: CANCELLED | OUTPATIENT
Start: 2019-05-31

## 2019-08-09 ENCOUNTER — HOSPITAL ENCOUNTER (OUTPATIENT)
Age: 79
Setting detail: OBSERVATION
Discharge: HOME | End: 2019-08-10
Attending: EMERGENCY MEDICINE | Admitting: INTERNAL MEDICINE
Payer: MEDICARE

## 2019-08-09 ENCOUNTER — APPOINTMENT (OUTPATIENT)
Dept: GENERAL RADIOLOGY | Age: 79
End: 2019-08-09
Payer: MEDICARE

## 2019-08-09 ENCOUNTER — TELEPHONE (OUTPATIENT)
Dept: CARDIOLOGY CLINIC | Age: 79
End: 2019-08-09

## 2019-08-09 DIAGNOSIS — R07.89 OTHER CHEST PAIN: Primary | ICD-10-CM

## 2019-08-09 LAB
A/G RATIO: 1.3 (ref 1.1–2.2)
ALBUMIN SERPL-MCNC: 4.2 G/DL (ref 3.4–5)
ALP BLD-CCNC: 72 U/L (ref 40–129)
ALT SERPL-CCNC: 22 U/L (ref 10–40)
ANION GAP SERPL CALCULATED.3IONS-SCNC: 9 MMOL/L (ref 3–16)
AST SERPL-CCNC: 15 U/L (ref 15–37)
BASOPHILS ABSOLUTE: 0.1 K/UL (ref 0–0.2)
BASOPHILS RELATIVE PERCENT: 0.7 %
BILIRUB SERPL-MCNC: 0.3 MG/DL (ref 0–1)
BUN BLDV-MCNC: 16 MG/DL (ref 7–20)
CALCIUM SERPL-MCNC: 9 MG/DL (ref 8.3–10.6)
CHLORIDE BLD-SCNC: 104 MMOL/L (ref 99–110)
CO2: 27 MMOL/L (ref 21–32)
CREAT SERPL-MCNC: 0.7 MG/DL (ref 0.6–1.2)
EKG ATRIAL RATE: 71 BPM
EKG DIAGNOSIS: NORMAL
EKG P AXIS: 43 DEGREES
EKG P-R INTERVAL: 146 MS
EKG Q-T INTERVAL: 448 MS
EKG QRS DURATION: 130 MS
EKG QTC CALCULATION (BAZETT): 486 MS
EKG R AXIS: -9 DEGREES
EKG T AXIS: -11 DEGREES
EKG VENTRICULAR RATE: 71 BPM
EOSINOPHILS ABSOLUTE: 0.2 K/UL (ref 0–0.6)
EOSINOPHILS RELATIVE PERCENT: 3.4 %
GFR AFRICAN AMERICAN: >60
GFR NON-AFRICAN AMERICAN: >60
GLOBULIN: 3.2 G/DL
GLUCOSE BLD-MCNC: 223 MG/DL (ref 70–99)
GLUCOSE BLD-MCNC: 229 MG/DL (ref 70–99)
GLUCOSE BLD-MCNC: 294 MG/DL (ref 70–99)
HCT VFR BLD CALC: 43.1 % (ref 36–48)
HEMOGLOBIN: 14.4 G/DL (ref 12–16)
INR BLD: 3.03 (ref 0.86–1.14)
LYMPHOCYTES ABSOLUTE: 2.1 K/UL (ref 1–5.1)
LYMPHOCYTES RELATIVE PERCENT: 27.6 %
MCH RBC QN AUTO: 29.8 PG (ref 26–34)
MCHC RBC AUTO-ENTMCNC: 33.4 G/DL (ref 31–36)
MCV RBC AUTO: 89.1 FL (ref 80–100)
MONOCYTES ABSOLUTE: 0.5 K/UL (ref 0–1.3)
MONOCYTES RELATIVE PERCENT: 7 %
NEUTROPHILS ABSOLUTE: 4.6 K/UL (ref 1.7–7.7)
NEUTROPHILS RELATIVE PERCENT: 61.3 %
PDW BLD-RTO: 13.4 % (ref 12.4–15.4)
PERFORMED ON: ABNORMAL
PERFORMED ON: ABNORMAL
PLATELET # BLD: 191 K/UL (ref 135–450)
PMV BLD AUTO: 9.2 FL (ref 5–10.5)
POTASSIUM SERPL-SCNC: 4.1 MMOL/L (ref 3.5–5.1)
PRO-BNP: 36 PG/ML (ref 0–449)
PROTHROMBIN TIME: 34.5 SEC (ref 9.8–13)
RBC # BLD: 4.84 M/UL (ref 4–5.2)
SODIUM BLD-SCNC: 140 MMOL/L (ref 136–145)
TOTAL PROTEIN: 7.4 G/DL (ref 6.4–8.2)
TROPONIN: <0.01 NG/ML
WBC # BLD: 7.4 K/UL (ref 4–11)

## 2019-08-09 PROCEDURE — 2580000003 HC RX 258: Performed by: INTERNAL MEDICINE

## 2019-08-09 PROCEDURE — 6370000000 HC RX 637 (ALT 250 FOR IP): Performed by: INTERNAL MEDICINE

## 2019-08-09 PROCEDURE — 93010 ELECTROCARDIOGRAM REPORT: CPT | Performed by: INTERNAL MEDICINE

## 2019-08-09 PROCEDURE — 80053 COMPREHEN METABOLIC PANEL: CPT

## 2019-08-09 PROCEDURE — 85610 PROTHROMBIN TIME: CPT

## 2019-08-09 PROCEDURE — G0378 HOSPITAL OBSERVATION PER HR: HCPCS

## 2019-08-09 PROCEDURE — 36415 COLL VENOUS BLD VENIPUNCTURE: CPT

## 2019-08-09 PROCEDURE — 84484 ASSAY OF TROPONIN QUANT: CPT

## 2019-08-09 PROCEDURE — 99285 EMERGENCY DEPT VISIT HI MDM: CPT

## 2019-08-09 PROCEDURE — 71045 X-RAY EXAM CHEST 1 VIEW: CPT

## 2019-08-09 PROCEDURE — 83880 ASSAY OF NATRIURETIC PEPTIDE: CPT

## 2019-08-09 PROCEDURE — 83036 HEMOGLOBIN GLYCOSYLATED A1C: CPT

## 2019-08-09 PROCEDURE — 85025 COMPLETE CBC W/AUTO DIFF WBC: CPT

## 2019-08-09 PROCEDURE — 6370000000 HC RX 637 (ALT 250 FOR IP): Performed by: PHYSICIAN ASSISTANT

## 2019-08-09 PROCEDURE — 93005 ELECTROCARDIOGRAM TRACING: CPT | Performed by: EMERGENCY MEDICINE

## 2019-08-09 RX ORDER — INSULIN GLARGINE 100 [IU]/ML
40 INJECTION, SOLUTION SUBCUTANEOUS NIGHTLY
Status: DISCONTINUED | OUTPATIENT
Start: 2019-08-09 | End: 2019-08-10 | Stop reason: HOSPADM

## 2019-08-09 RX ORDER — LISINOPRIL 20 MG/1
40 TABLET ORAL DAILY
Status: DISCONTINUED | OUTPATIENT
Start: 2019-08-09 | End: 2019-08-10 | Stop reason: HOSPADM

## 2019-08-09 RX ORDER — SODIUM CHLORIDE 0.9 % (FLUSH) 0.9 %
10 SYRINGE (ML) INJECTION EVERY 12 HOURS SCHEDULED
Status: DISCONTINUED | OUTPATIENT
Start: 2019-08-09 | End: 2019-08-10 | Stop reason: HOSPADM

## 2019-08-09 RX ORDER — ROSUVASTATIN CALCIUM 10 MG/1
10 TABLET, COATED ORAL DAILY
Status: DISCONTINUED | OUTPATIENT
Start: 2019-08-09 | End: 2019-08-10 | Stop reason: HOSPADM

## 2019-08-09 RX ORDER — WARFARIN SODIUM 5 MG/1
2.5 TABLET ORAL
Status: COMPLETED | OUTPATIENT
Start: 2019-08-09 | End: 2019-08-09

## 2019-08-09 RX ORDER — LEVOTHYROXINE SODIUM 0.12 MG/1
125 TABLET ORAL DAILY
Status: DISCONTINUED | OUTPATIENT
Start: 2019-08-09 | End: 2019-08-10

## 2019-08-09 RX ORDER — NITROGLYCERIN 0.4 MG/1
0.4 TABLET SUBLINGUAL EVERY 5 MIN PRN
Status: DISCONTINUED | OUTPATIENT
Start: 2019-08-09 | End: 2019-08-10 | Stop reason: HOSPADM

## 2019-08-09 RX ORDER — DEXTROSE MONOHYDRATE 50 MG/ML
100 INJECTION, SOLUTION INTRAVENOUS PRN
Status: DISCONTINUED | OUTPATIENT
Start: 2019-08-09 | End: 2019-08-10 | Stop reason: HOSPADM

## 2019-08-09 RX ORDER — SODIUM CHLORIDE 0.9 % (FLUSH) 0.9 %
10 SYRINGE (ML) INJECTION PRN
Status: DISCONTINUED | OUTPATIENT
Start: 2019-08-09 | End: 2019-08-10 | Stop reason: HOSPADM

## 2019-08-09 RX ORDER — ONDANSETRON 2 MG/ML
4 INJECTION INTRAMUSCULAR; INTRAVENOUS EVERY 6 HOURS PRN
Status: DISCONTINUED | OUTPATIENT
Start: 2019-08-09 | End: 2019-08-10 | Stop reason: HOSPADM

## 2019-08-09 RX ORDER — NICOTINE POLACRILEX 4 MG
15 LOZENGE BUCCAL PRN
Status: DISCONTINUED | OUTPATIENT
Start: 2019-08-09 | End: 2019-08-10 | Stop reason: HOSPADM

## 2019-08-09 RX ORDER — ASPIRIN 81 MG/1
81 TABLET, CHEWABLE ORAL DAILY
Status: DISCONTINUED | OUTPATIENT
Start: 2019-08-10 | End: 2019-08-10 | Stop reason: HOSPADM

## 2019-08-09 RX ORDER — DEXTROSE MONOHYDRATE 25 G/50ML
12.5 INJECTION, SOLUTION INTRAVENOUS PRN
Status: DISCONTINUED | OUTPATIENT
Start: 2019-08-09 | End: 2019-08-10 | Stop reason: HOSPADM

## 2019-08-09 RX ADMIN — Medication 10 ML: at 21:41

## 2019-08-09 RX ADMIN — WARFARIN SODIUM 2.5 MG: 5 TABLET ORAL at 18:36

## 2019-08-09 RX ADMIN — NITROGLYCERIN 0.5 INCH: 20 OINTMENT TOPICAL at 12:47

## 2019-08-09 RX ADMIN — METOPROLOL TARTRATE 25 MG: 25 TABLET ORAL at 21:37

## 2019-08-09 RX ADMIN — INSULIN LISPRO 3 UNITS: 100 INJECTION, SOLUTION INTRAVENOUS; SUBCUTANEOUS at 18:37

## 2019-08-09 RX ADMIN — INSULIN LISPRO 2 UNITS: 100 INJECTION, SOLUTION INTRAVENOUS; SUBCUTANEOUS at 21:37

## 2019-08-09 RX ADMIN — INSULIN GLARGINE 40 UNITS: 100 INJECTION, SOLUTION SUBCUTANEOUS at 21:37

## 2019-08-09 ASSESSMENT — PAIN SCALES - GENERAL: PAINLEVEL_OUTOF10: 0

## 2019-08-09 NOTE — ED PROVIDER NOTES
[address] [facility] Emergency Department    CHIEF COMPLAINT  Chest Pain (pt states no chest pain currently however cardiologist told her to come in.)      [address] [facility]  Lien Masterson is a [de-identified] y.o. female who presents to the ED complaining of several day history of chest pain and shortness of breath. Patient observed lying in bed, appears nontoxic and in no acute distress. She is accompanied by her  today for evaluation. Patient reports that chest discomfort earlier this week which appeared to go away. Today came back. Reports that there is no obvious aggravating or alleviating factors. Chest pain occurred while at rest.  States that she is having feels as if she is having difficulty breathing. Mild nonproductive cough. No hemoptysis. Denies fevers or chills. No headache, lightheadedness, dizziness or confusion. Chest pain appears to wax and wane. Last for approximately 30 minutes before resolving. Not in any current pain. No abdominal pain. No nausea vomiting. No urinary symptoms. No leg pain or swelling. No other complaints, modifying factors or associated symptoms. Nursing notes reviewed.    Past Medical History:   Diagnosis Date    Atrial fibrillation ([address]     Cancer ([address]     Cerebral artery occlusion with cerebral infarction ([address]     left sided weakness-left arm and left leg, also left eye    Diabetes mellitus ([address]     Fractures     Hypertension     Thyroid disease      Past Surgical History:   Procedure Laterality Date    ANKLE FRACTURE SURGERY  2019    left ankle    ANKLE SURGERY Left 6/17/2014    Removal painful hardware    CHOLECYSTECTOMY      CYST REMOVAL      chest x2, back x1    HAND SURGERY      right    HAND SURGERY  2/21/12    DUPUYTRENS RELEASE LEFT MIDDLE AND RING FINGERS INCLUDING    HYSTERECTOMY      THYROIDECTOMY       Family History   Problem Relation Age of Onset    Cancer Mother     Diabetes Mother    [facility] 135 - 450 K/uL    MPV 9.2 5.0 - 10.5 fL    Neutrophils % 61.3 %    Lymphocytes % 27.6 %    Monocytes % 7.0 %    Eosinophils % 3.4 %    Basophils % 0.7 %    Neutrophils # 4.6 1.7 - 7.7 K/uL    Lymphocytes # 2.1 1.0 - 5.1 K/uL    Monocytes # 0.5 0.0 - 1.3 K/uL    Eosinophils # 0.2 0.0 - 0.6 K/uL    Basophils # 0.1 0.0 - 0.2 K/uL   Comprehensive Metabolic Panel   Result Value Ref Range    Sodium 140 136 - 145 mmol/L    Potassium 4.1 3.5 - 5.1 mmol/L    Chloride 104 99 - 110 mmol/L    CO2 27 21 - 32 mmol/L    Anion Gap 9 3 - 16    Glucose 223 (H) 70 - 99 mg/dL    BUN 16 7 - 20 mg/dL    CREATININE 0.7 0.6 - 1.2 mg/dL    GFR Non-African American >60 >60    GFR African American >60 >60    Calcium 9.0 8.3 - 10.6 mg/dL    Total Protein 7.4 6.4 - 8.2 g/dL    Alb 4.2 3.4 - 5.0 g/dL    Albumin/Globulin Ratio 1.3 1.1 - 2.2    Total Bilirubin 0.3 0.0 - 1.0 mg/dL    Alkaline Phosphatase 72 40 - 129 U/L    ALT 22 10 - 40 U/L    AST 15 15 - 37 U/L    Globulin 3.2 g/dL   Protime-INR   Result Value Ref Range    Protime 34.5 (H) 9.8 - 13.0 sec    INR 3.03 (H) 0.86 - 1.14   Troponin   Result Value Ref Range    Troponin <0.01 <0.01 ng/mL   Brain Natriuretic Peptide   Result Value Ref Range    Pro-BNP 36 0 - 449 pg/mL   EKG 12 Lead   Result Value Ref Range    Ventricular Rate 71 BPM    Atrial Rate 71 BPM    P-R Interval 146 ms    QRS Duration 130 ms    Q-T Interval 448 ms    QTc Calculation (Bazett) 486 ms    P Axis 43 degrees    R Axis -9 degrees    T Axis -11 degrees    Diagnosis       Normal sinus rhythmRight bundle branch blockInferior infarct (cited on or before 21-NOV-2015)Abnormal ECGWhen compared with ECG of 08-JAN-2019 10:48,Right bundle branch block is now PresentBorderline criteria for Anterior infarct are no longer PresentConfirmed by Mariza Duran ([id number]) on 8/9/2019 1:50:30 PM     I spoke with Earl Ramirez and Jf.  We thoroughly discussed the history, physical exam, laboratory and imaging studies, as well as, emergency department course. Based upon that discussion, we've decided to admit Joy Dey to the hospital for further observation, evaluation, and treatment. Final Impression  1. Other chest pain      Blood pressure (!) 165/73, pulse 68, temperature 97.8 °F (36.6 °C), temperature source Oral, resp. rate 22, height 5' 5\" (1.651 m), weight 197 lb (89.4 kg), SpO2 94 %, not currently breastfeeding. DISPOSITION  Patient was admitted to the hospital in stable condition.          Nathan Jacome [place]  08/09/19 1524

## 2019-08-09 NOTE — H&P
statin, BB  - Tele    PAF  - currently in NSR  - followed by cardiology as outpatient  - coumadin dosing per pharmacy. Continue BB. DMII  - poorly controlled. Hgb A1c pending  - Lantus 40u with SSI ordered    HTN  - well controlled  - continue home ACE, BB    Hypothyroidism  - continue home synthroid    DVT Prophylaxis: coumadin  Diet: Diet NPO, After Midnight  DIET CARB CONTROL; No Caffeine  Code Status: Full Code    PT/OT Eval Status: not ordered    Dispo - likely home tomorrow       Antonia Krabbe, MD    Thank you ANANT Ivy - GAYATHRI for the opportunity to be involved in this patient's care. If you have any questions or concerns please feel free to contact me at [phone].

## 2019-08-09 NOTE — ED PROVIDER NOTES
I independently performed a history and physical on [facility]. All diagnostic, treatment, and disposition decisions were made by myself in conjunction with the mid-level provider. Briefly the patient's history and exam was the following: For further details of [facility] emergency department encounter, please see PATRICE Munson's documentation. HISTORY:  Patient presents to ED with complaints of several day h/o off and on chest pain associated with shortness of breath. No exacerbating or relieving factors. Not necessarily exertional. No diaphoresis. No nausea or vomiting. No fever. No URI symptoms. No leg pain or  Swelling. No h/o DVT or PE. No known CAD but no stress test for several years. On coumadin for afib. EXAM:  Patient in no distress  Heart RRR, no murmurs. Lungs clear. No rales. No wheezing  Abdomen nontender  No edema. No calf tenderness    ED Triage Vitals [08/09/19 1139]   Enc Vitals Group      BP (!) 168/77      Pulse 75      Resp 16      Temp 97.8 °F (36.6 °C)      Temp Source Oral      SpO2 95 %      Weight 197 lb (89.4 kg)      Height 5' 5\" (1.651 m)      Head Circumference       Peak Flow       Pain Score       Pain Loc       Pain Edu? Excl. in [address]? MEDICAL DECISION MAKING:    EKG as interpreted by myself:  normal sinus rhythm with a rate of 71  Axis is   Normal  QTc is  486ms  RBBB   No specific ST-T wave changes appreciated. No evidence of acute ischemia. Compared to prior EKG dated 1/8/19, RBBB new    XR CHEST PORTABLE   Final Result   No infiltrate or edema. Poor inspiratory effort results in pulmonary   vascular crowding.   Pulmonary vascular congestion would be difficult to   exclude because of this             Results for orders placed or performed during the hospital encounter of 08/09/19   CBC Auto Differential   Result Value Ref Range    WBC 7.4 4.0 - 11.0 K/uL    RBC 4.84 4.00 - 5.20 M/uL    Hemoglobin 14.4 12.0 - 16.0 g/dL    Hematocrit 43.1 36.0 -

## 2019-08-09 NOTE — PROGRESS NOTES
Patient admitted to room 114-01 from ED. Patient oriented to room, call light, bed rails, phone, lights and bathroom. Patient instructed about the schedule of the day including: vital sign frequency, lab draws, possible tests, frequency of MD and staff rounds, including RN/MD rounding together at bedside, daily weights, and I &O's. Patient instructed about prescribed diet, how to use 8MENU, and television. Bed alarm in place, patient aware of placement and reason. Telemetry box  in place, patient aware of placement and reason. Bed locked, in lowest position, side rails up 2/4, call light within reach. Will continue to monitor.

## 2019-08-10 ENCOUNTER — APPOINTMENT (OUTPATIENT)
Dept: NUCLEAR MEDICINE | Age: 79
End: 2019-08-10
Payer: MEDICARE

## 2019-08-10 VITALS
BODY MASS INDEX: 32.21 KG/M2 | OXYGEN SATURATION: 93 % | TEMPERATURE: 98.7 F | SYSTOLIC BLOOD PRESSURE: 167 MMHG | WEIGHT: 193.34 LBS | HEIGHT: 65 IN | DIASTOLIC BLOOD PRESSURE: 75 MMHG | HEART RATE: 67 BPM | RESPIRATION RATE: 15 BRPM

## 2019-08-10 LAB
EKG ATRIAL RATE: 72 BPM
EKG DIAGNOSIS: NORMAL
EKG P AXIS: 70 DEGREES
EKG P-R INTERVAL: 152 MS
EKG Q-T INTERVAL: 464 MS
EKG QRS DURATION: 126 MS
EKG QTC CALCULATION (BAZETT): 508 MS
EKG R AXIS: 33 DEGREES
EKG T AXIS: 28 DEGREES
EKG VENTRICULAR RATE: 72 BPM
ESTIMATED AVERAGE GLUCOSE: 211.6 MG/DL
GLUCOSE BLD-MCNC: 199 MG/DL (ref 70–99)
GLUCOSE BLD-MCNC: 234 MG/DL (ref 70–99)
HBA1C MFR BLD: 9 %
HCT VFR BLD CALC: 41.5 % (ref 36–48)
HEMOGLOBIN: 14 G/DL (ref 12–16)
INR BLD: 2.8 (ref 0.86–1.14)
LV EF: 55 %
LV EF: 70 %
LVEF MODALITY: NORMAL
LVEF MODALITY: NORMAL
MCH RBC QN AUTO: 29.8 PG (ref 26–34)
MCHC RBC AUTO-ENTMCNC: 33.7 G/DL (ref 31–36)
MCV RBC AUTO: 88.6 FL (ref 80–100)
PDW BLD-RTO: 14.1 % (ref 12.4–15.4)
PERFORMED ON: ABNORMAL
PERFORMED ON: ABNORMAL
PLATELET # BLD: 183 K/UL (ref 135–450)
PMV BLD AUTO: 9.2 FL (ref 5–10.5)
PROTHROMBIN TIME: 31.9 SEC (ref 9.8–13)
RBC # BLD: 4.68 M/UL (ref 4–5.2)
TROPONIN: <0.01 NG/ML
WBC # BLD: 6.3 K/UL (ref 4–11)

## 2019-08-10 PROCEDURE — 6360000002 HC RX W HCPCS: Performed by: INTERNAL MEDICINE

## 2019-08-10 PROCEDURE — 36415 COLL VENOUS BLD VENIPUNCTURE: CPT

## 2019-08-10 PROCEDURE — G0378 HOSPITAL OBSERVATION PER HR: HCPCS

## 2019-08-10 PROCEDURE — 6370000000 HC RX 637 (ALT 250 FOR IP): Performed by: INTERNAL MEDICINE

## 2019-08-10 PROCEDURE — 2580000003 HC RX 258: Performed by: INTERNAL MEDICINE

## 2019-08-10 PROCEDURE — 83036 HEMOGLOBIN GLYCOSYLATED A1C: CPT

## 2019-08-10 PROCEDURE — 93010 ELECTROCARDIOGRAM REPORT: CPT | Performed by: INTERNAL MEDICINE

## 2019-08-10 PROCEDURE — 78452 HT MUSCLE IMAGE SPECT MULT: CPT

## 2019-08-10 PROCEDURE — 93306 TTE W/DOPPLER COMPLETE: CPT

## 2019-08-10 PROCEDURE — 93017 CV STRESS TEST TRACING ONLY: CPT

## 2019-08-10 PROCEDURE — A9502 TC99M TETROFOSMIN: HCPCS | Performed by: INTERNAL MEDICINE

## 2019-08-10 PROCEDURE — 85610 PROTHROMBIN TIME: CPT

## 2019-08-10 PROCEDURE — 85027 COMPLETE CBC AUTOMATED: CPT

## 2019-08-10 PROCEDURE — 84484 ASSAY OF TROPONIN QUANT: CPT

## 2019-08-10 PROCEDURE — 93005 ELECTROCARDIOGRAM TRACING: CPT | Performed by: INTERNAL MEDICINE

## 2019-08-10 PROCEDURE — 3430000000 HC RX DIAGNOSTIC RADIOPHARMACEUTICAL: Performed by: INTERNAL MEDICINE

## 2019-08-10 RX ORDER — WARFARIN SODIUM 7.5 MG/1
7.5 TABLET ORAL DAILY
Status: DISCONTINUED | OUTPATIENT
Start: 2019-08-10 | End: 2019-08-10 | Stop reason: HOSPADM

## 2019-08-10 RX ORDER — LEVOTHYROXINE SODIUM 0.12 MG/1
125 TABLET ORAL DAILY
Status: DISCONTINUED | OUTPATIENT
Start: 2019-08-10 | End: 2019-08-10 | Stop reason: HOSPADM

## 2019-08-10 RX ADMIN — LEVOTHYROXINE SODIUM 125 MCG: 125 TABLET ORAL at 10:57

## 2019-08-10 RX ADMIN — TETROFOSMIN 32.2 MILLICURIE: 1.38 INJECTION, POWDER, LYOPHILIZED, FOR SOLUTION INTRAVENOUS at 09:10

## 2019-08-10 RX ADMIN — LISINOPRIL 40 MG: 20 TABLET ORAL at 10:57

## 2019-08-10 RX ADMIN — INSULIN LISPRO 2 UNITS: 100 INJECTION, SOLUTION INTRAVENOUS; SUBCUTANEOUS at 13:22

## 2019-08-10 RX ADMIN — METOPROLOL TARTRATE 25 MG: 25 TABLET ORAL at 10:57

## 2019-08-10 RX ADMIN — REGADENOSON 0.4 MG: 0.08 INJECTION, SOLUTION INTRAVENOUS at 09:10

## 2019-08-10 RX ADMIN — ASPIRIN 81 MG 81 MG: 81 TABLET ORAL at 10:58

## 2019-08-10 RX ADMIN — ROSUVASTATIN CALCIUM 10 MG: 10 TABLET, FILM COATED ORAL at 10:58

## 2019-08-10 RX ADMIN — TETROFOSMIN 11.2 MILLICURIE: 1.38 INJECTION, POWDER, LYOPHILIZED, FOR SOLUTION INTRAVENOUS at 07:55

## 2019-08-10 RX ADMIN — Medication 10 ML: at 10:57

## 2019-08-10 ASSESSMENT — PAIN SCALES - GENERAL
PAINLEVEL_OUTOF10: 0
PAINLEVEL_OUTOF10: 0

## 2019-08-10 NOTE — DISCHARGE INSTR - COC
[facility]) T84.84XA    Left ankle pain M25.572    Left foot pain M79.672    Ischemic stroke (HCC) I63.9    Chronic ischemic heart disease I25.9    Essential hypertension I10    Hyperlipidemia E78.5    Transient cerebral ischemia G45.9    Encounter for loop recorder check Z45.09    Closed fracture of proximal end of left humerus S42.202A    CVA, old, hemiparesis (HCC) I69.359    Tachycardia R00.0    Cerebrovascular accident (CVA) ([facility]) I63.9       Isolation/Infection:   Isolation          No Isolation            Nurse Assessment:  Last Vital Signs: BP (!) 176/82   Pulse 68   Temp 98.2 °F (36.8 °C) (Oral)   Resp 16   Ht 5' 5\" (1.651 m)   Wt 193 lb 5.5 oz (87.7 kg)   SpO2 94%   BMI 32.17 kg/m²     Last documented pain score (0-10 scale): Pain Level: 0  Last Weight:   Wt Readings from Last 1 Encounters:   08/10/19 193 lb 5.5 oz (87.7 kg)     Mental Status:  {IP PT MENTAL STATUS:38885}    IV Access:  { AMIE IV ACCESS:221082952}    Nursing Mobility/ADLs:  Walking   {P DME GJGZ:633900336}  Transfer  {[facility] DME UBKU:883564893}  Bathing  {P DME WEPB:968562668}  Dressing  {P DME GFTD:020082632}  Toileting  {P DME JSRK:382530678}  Feeding  {[facility] DME JOTO:211475007}  Med Admin  {[facility] DME CGRP:964688929}  Med Delivery   { AMIE MED Delivery:183899871}    Wound Care Documentation and Therapy:        Elimination:  Continence:   · Bowel: {YES / BE:52120}  · Bladder: {YES / KO:02551}  Urinary Catheter: {Urinary Catheter:828272225}   Colostomy/Ileostomy/Ileal Conduit: {YES / RY:95165}       Date of Last BM: ***    Intake/Output Summary (Last 24 hours) at 8/10/2019 1220  Last data filed at 8/10/2019 1103  Gross per 24 hour   Intake 60 ml   Output 700 ml   Net -640 ml     I/O last 3 completed shifts:   In: 0   Out: 700 [Urine:700]    Safety Concerns:     [address] AMIE Safety Concerns:955985540}    Impairments/Disabilities:      [address] AMIE Impairments/Disabilities:082932328}    Nutrition Therapy:  Current Nutrition Therapy:

## 2019-08-10 NOTE — PLAN OF CARE
Patient is at risk for falls. Bed alarm on, bed in lowest position, and bed wheels locked. Educated patient to use call light when needing assistance. Call light and bedside table within reach.

## 2019-08-11 LAB
ESTIMATED AVERAGE GLUCOSE: 203 MG/DL
HBA1C MFR BLD: 8.7 %

## 2019-08-12 LAB
GLUCOSE BLD-MCNC: 259 MG/DL (ref 70–99)
PERFORMED ON: ABNORMAL

## 2019-08-21 ENCOUNTER — HOSPITAL ENCOUNTER (OUTPATIENT)
Age: 79
Discharge: HOME | End: 2019-08-21
Payer: MEDICARE

## 2019-08-21 ENCOUNTER — OFFICE VISIT (OUTPATIENT)
Dept: ENDOCRINOLOGY | Age: 79
End: 2019-08-21
Payer: MEDICARE

## 2019-08-21 VITALS
SYSTOLIC BLOOD PRESSURE: 166 MMHG | BODY MASS INDEX: 33.49 KG/M2 | OXYGEN SATURATION: 95 % | HEART RATE: 69 BPM | DIASTOLIC BLOOD PRESSURE: 80 MMHG | WEIGHT: 196.2 LBS | HEIGHT: 64 IN

## 2019-08-21 DIAGNOSIS — E11.00 DM HYPEROSMOLARITY TYPE II, UNCONTROLLED (HCC): ICD-10-CM

## 2019-08-21 DIAGNOSIS — E55.9 VITAMIN D DEFICIENCY: ICD-10-CM

## 2019-08-21 DIAGNOSIS — I10 ESSENTIAL HYPERTENSION: ICD-10-CM

## 2019-08-21 DIAGNOSIS — E11.65 DM HYPEROSMOLARITY TYPE II, UNCONTROLLED (HCC): ICD-10-CM

## 2019-08-21 DIAGNOSIS — E11.00 DM HYPEROSMOLARITY TYPE II, UNCONTROLLED (HCC): Primary | ICD-10-CM

## 2019-08-21 DIAGNOSIS — E03.9 ACQUIRED HYPOTHYROIDISM: ICD-10-CM

## 2019-08-21 DIAGNOSIS — E11.65 DM HYPEROSMOLARITY TYPE II, UNCONTROLLED (HCC): Primary | ICD-10-CM

## 2019-08-21 DIAGNOSIS — E78.5 HYPERLIPIDEMIA, UNSPECIFIED HYPERLIPIDEMIA TYPE: ICD-10-CM

## 2019-08-21 LAB
CREATININE URINE: 63.6 MG/DL (ref 28–259)
MICROALBUMIN UR-MCNC: 7.1 MG/DL
MICROALBUMIN/CREAT UR-RTO: 111.6 MG/G (ref 0–30)
TSH REFLEX FT4: 0.63 UIU/ML (ref 0.27–4.2)

## 2019-08-21 PROCEDURE — 95250 CONT GLUC MNTR PHYS/QHP EQP: CPT | Performed by: NURSE PRACTITIONER

## 2019-08-21 PROCEDURE — 84681 ASSAY OF C-PEPTIDE: CPT

## 2019-08-21 PROCEDURE — 84443 ASSAY THYROID STIM HORMONE: CPT

## 2019-08-21 PROCEDURE — 82570 ASSAY OF URINE CREATININE: CPT

## 2019-08-21 PROCEDURE — 99214 OFFICE O/P EST MOD 30 MIN: CPT | Performed by: NURSE PRACTITIONER

## 2019-08-21 PROCEDURE — 82043 UR ALBUMIN QUANTITATIVE: CPT

## 2019-08-21 PROCEDURE — 36415 COLL VENOUS BLD VENIPUNCTURE: CPT

## 2019-08-21 ASSESSMENT — ENCOUNTER SYMPTOMS
NAUSEA: 0
COLOR CHANGE: 0
EYE PAIN: 0
DIARRHEA: 0
SHORTNESS OF BREATH: 0
CONSTIPATION: 0

## 2019-08-21 NOTE — PROGRESS NOTES
PROT 7.4 08/09/2019    LABALBU 4.2 08/09/2019    BILITOT 0.3 08/09/2019    ALKPHOS 72 08/09/2019    AST 15 08/09/2019    ALT 22 08/09/2019    LABGLOM >60 08/09/2019    GFRAA >60 08/09/2019    AGRATIO 1.3 08/09/2019    GLOB 3.2 08/09/2019     The ASCVD Risk score (Prema Roca et al., 2013) failed to calculate for the following reasons: The 2013 ASCVD risk score is only valid for ages 36 to 78    The patient has a prior MI or stroke diagnosis    Family History   Problem Relation Age of Onset    Cancer Mother     Diabetes Mother     Cancer Brother     High Blood Pressure Brother     Depression Maternal Aunt     Heart Disease Brother     Diabetes Brother     High Blood Pressure Brother     Heart Disease Brother     Diabetes Brother     High Blood Pressure Brother      Review of Systems   Constitutional: Negative for activity change, appetite change, diaphoresis, fever and unexpected weight change. HENT: Negative for dental problem. Eyes: Negative for pain and visual disturbance. Respiratory: Negative for shortness of breath. Cardiovascular: Negative for chest pain, palpitations and leg swelling. Gastrointestinal: Negative for constipation, diarrhea and nausea. Endocrine: Negative for cold intolerance, heat intolerance, polydipsia, polyphagia and polyuria. Genitourinary: Negative for frequency and urgency. Musculoskeletal: Negative for arthralgias, joint swelling and myalgias. Skin: Negative for color change and pallor. Neurological: Negative for weakness, numbness and headaches. Psychiatric/Behavioral: Negative for dysphoric mood and sleep disturbance. The patient is not nervous/anxious. Vitals:    08/21/19 0906   BP: (!) 166/80   Site: Right Upper Arm   Position: Sitting   Cuff Size: Medium Adult   Pulse: 69   SpO2: 95%   Weight: 196 lb 3.2 oz (89 kg)   Height: 5' 4\" (1.626 m)     Physical Exam   Constitutional: She is oriented to person, place, and time.  She appears Relevant Orders    Vitamin D 25 Hydroxy        Greater than 40 minutes spent directly counseling patient about topics listed above (such as lifestyle modifications, preventative screenings and/or disease related processes. Return in about 1 month (around 9/21/2019).

## 2019-08-23 LAB — C-PEPTIDE: 2.6 NG/ML (ref 1.1–4.4)

## 2019-08-27 PROBLEM — E55.9 VITAMIN D DEFICIENCY: Status: ACTIVE | Noted: 2019-08-27

## 2019-08-27 NOTE — ASSESSMENT & PLAN NOTE
Lab Results   Component Value Date    LABA1C 8.7 08/10/2019     Goal is < 7%  Reviewed medication management: currently on levemir.  36 units BID  Discussed checking prior to each dose  Discussed dietary compliance  Sensor applied  Will review download and followup in 1 month

## 2019-08-27 NOTE — ASSESSMENT & PLAN NOTE
Blood pressure elevated at visit. States has not had morning meds. Anxious at new patient visit.    Should check at home QD and maintain BP log  Review log with PCP

## 2019-08-28 ENCOUNTER — NURSE ONLY (OUTPATIENT)
Dept: ENDOCRINOLOGY | Age: 79
End: 2019-08-28

## 2019-08-28 ENCOUNTER — TELEPHONE (OUTPATIENT)
Dept: ENDOCRINOLOGY | Age: 79
End: 2019-08-28

## 2019-08-28 VITALS
RESPIRATION RATE: 14 BRPM | WEIGHT: 196 LBS | OXYGEN SATURATION: 96 % | BODY MASS INDEX: 33.46 KG/M2 | DIASTOLIC BLOOD PRESSURE: 74 MMHG | SYSTOLIC BLOOD PRESSURE: 110 MMHG | HEIGHT: 64 IN | HEART RATE: 78 BPM

## 2019-08-28 DIAGNOSIS — E78.5 HYPERLIPIDEMIA, UNSPECIFIED HYPERLIPIDEMIA TYPE: Primary | ICD-10-CM

## 2019-08-28 DIAGNOSIS — E11.00 DM HYPEROSMOLARITY TYPE II, UNCONTROLLED (HCC): ICD-10-CM

## 2019-08-28 DIAGNOSIS — E11.65 DM HYPEROSMOLARITY TYPE II, UNCONTROLLED (HCC): ICD-10-CM

## 2019-08-28 NOTE — TELEPHONE ENCOUNTER
Patient  called and said daniel pcp asked that a freestyle kimi script be sent for her due to her only having one hand.  Please call  back with questions

## 2019-08-28 NOTE — PROGRESS NOTES
Sensor removed by Dr. Mitchell Santos . Sensor will be review in [place] office tomorrow. Diabetes meal planning given to patient.

## 2019-08-29 RX ORDER — FLASH GLUCOSE SCANNING READER
1 EACH MISCELLANEOUS ONCE
Qty: 1 DEVICE | Refills: 0 | Status: SHIPPED | OUTPATIENT
Start: 2019-08-29 | End: 2019-08-29

## 2019-08-29 RX ORDER — FLASH GLUCOSE SENSOR
1 KIT MISCELLANEOUS
Qty: 1 EACH | Refills: 5 | Status: SHIPPED | OUTPATIENT
Start: 2019-08-29 | End: 2019-11-08 | Stop reason: SDUPTHER

## 2019-09-04 ENCOUNTER — OFFICE VISIT (OUTPATIENT)
Dept: CARDIOLOGY CLINIC | Age: 79
End: 2019-09-04
Payer: MEDICARE

## 2019-09-04 VITALS
BODY MASS INDEX: 33.46 KG/M2 | SYSTOLIC BLOOD PRESSURE: 120 MMHG | DIASTOLIC BLOOD PRESSURE: 68 MMHG | HEIGHT: 64 IN | HEART RATE: 70 BPM | WEIGHT: 196 LBS

## 2019-09-04 DIAGNOSIS — I48.0 PAROXYSMAL A-FIB (HCC): ICD-10-CM

## 2019-09-04 DIAGNOSIS — R07.89 OTHER CHEST PAIN: ICD-10-CM

## 2019-09-04 DIAGNOSIS — G45.9 TRANSIENT CEREBRAL ISCHEMIA, UNSPECIFIED TYPE: Primary | ICD-10-CM

## 2019-09-04 DIAGNOSIS — I48.0 PAF (PAROXYSMAL ATRIAL FIBRILLATION) (HCC): ICD-10-CM

## 2019-09-04 PROCEDURE — 99214 OFFICE O/P EST MOD 30 MIN: CPT | Performed by: INTERNAL MEDICINE

## 2019-09-04 PROCEDURE — 93000 ELECTROCARDIOGRAM COMPLETE: CPT | Performed by: INTERNAL MEDICINE

## 2019-09-04 NOTE — PROGRESS NOTES
beat fast at times. She is tolerating her medications. EKg today Sinus  Rhythm Right bundle branch block. Past Medical History:   has a past medical history of Atrial fibrillation ([facility] [address] [facility] Cerebral artery occlusion with cerebral infarction ([facility] [address] Diabetes mellitus ([facility] [address] Fractures, Hypertension, and Thyroid disease. Past Surgical History:   has a past surgical history that includes Thyroidectomy; Hysterectomy; Cholecystectomy; cyst removal; Hand surgery; Ankle fracture surgery (2019); Hand surgery (2/21/12); and Ankle surgery (Left, 6/17/2014). Allergies:  Contrast [iodides]    Social History:   reports that she has never smoked. She has never used smokeless tobacco. She reports that she does not drink alcohol or use drugs. Family History: family history includes Cancer in her brother and mother; Depression in her maternal aunt; Diabetes in her brother, brother, and mother; Heart Disease in her brother and brother; High Blood Pressure in her brother, brother, and brother. Home Medications:    Prior to Admission medications    Medication Sig Start Date End Date Taking?  Authorizing Provider   Continuous Blood Gluc Sensor (FREESTYLE ASHELY 14 DAY SENSOR) MISC 1 each by Does not apply route every 14 days 8/29/19  Yes ANANT Snowden CNP   rosuvastatin (CRESTOR) 10 MG tablet Take 1 tablet by mouth daily 7/25/18  Yes Edison Lanes, APRN - CNP   Insulin Detemir (LEVEMIR FLEXPEN SC) Inject 45 Units into the skin nightly    Yes Historical Provider, MD   lisinopril (PRINIVIL;ZESTRIL) 40 MG tablet Take 40 mg by mouth daily   Yes Historical Provider, MD   magnesium (MAGNESIUM-OXIDE) 250 MG TABS tablet Take 250 mg by mouth every other day   Yes Historical Provider, MD   Omega-3 Fatty Acids (FISH OIL) 1200 MG CAPS Take 1 capsule by mouth 2 times daily   Yes Historical Provider, MD   vitamin B-12 (CYANOCOBALAMIN) 1000 MCG tablet Take 1,000 mcg by mouth every other day    Yes Historical Provider,

## 2019-09-04 NOTE — LETTER
history of CVA, and chest pain. Since her last visit, on 8/9/19 she was admitted to the hospital with complaints of chest pain. Her EKG at this time demonstrated normal sinus rhythm with right BBB. Her troponin's were negative. A stress test on 8/10/19 showed no evidence of  myocardial ischemia or scar. An echocardiogram on 8/10/19 showed her LVEF is 55%, with mild concentric left ventricular hypertrophy, mild mitral regurg. Today she states she was having chest pain that started in the middle of the night. She went to the ER for evaluation. Her pain in on the left side of her chest and does not radiate. She states she feels her heart beat fast at times. She is tolerating her medications. EKg today Sinus  Rhythm Right bundle branch block. Past Medical History:   has a past medical history of Atrial fibrillation ([facility] [address] Cancer [facility]), Cerebral artery occlusion with cerebral infarction ([facility] [address] Diabetes mellitus ([facility] [address] Fractures, Hypertension, and Thyroid disease. Past Surgical History:   has a past surgical history that includes Thyroidectomy; Hysterectomy; Cholecystectomy; cyst removal; Hand surgery; Ankle fracture surgery (2019); Hand surgery (2/21/12); and Ankle surgery (Left, 6/17/2014). Allergies:  Contrast [iodides]    Social History:   reports that she has never smoked. She has never used smokeless tobacco. She reports that she does not drink alcohol or use drugs. Family History: family history includes Cancer in her brother and mother; Depression in her maternal aunt; Diabetes in her brother, brother, and mother; Heart Disease in her brother and brother; High Blood Pressure in her brother, brother, and brother. Home Medications:    Prior to Admission medications    Medication Sig Start Date End Date Taking?  Authorizing Provider   Continuous Blood Gluc Sensor (FREESTYLE ASHELY 14 DAY SENSOR) MISC 1 each by Does not apply route every 14 days 8/29/19  Yes Sherrill Cummings, ANANT - GAYATHRI Discussed AF can cause chest pain in setting of mild CAD    We talked about placing loop recorder to monitor heart rhythm. Risk, benefit, alternative, rationale of the procedure were discussed with the patient. She want's to hold off    Continue current medications     Follow up with Nura Méndez CNP in 6 months       Given the complex nature of the disease no guarantee was given on the success of the procedure. Explained to patient that discontinuation of anticoagulation is not an indication for the procedure. QUALITY MEASURES  1. Tobacco Cessation Counseling: NA  2. Retake of BP if >140/90:   NA  3. Documentation to PCP/referring for new patient:  Sent to PCP at close of office visit  4. CAD patient on anti-platelet: NA  5. CAD patient on STATIN therapy:  NA  6. Patient with CHF and aFib on anticoagulation:  Yes, Coumadin      Scribe's attestation: This note was scribed in the presence of Dr. Dilma Watkins. Caryn Alcantar M.D. By Becki Wills RN     I, Romie Nuñez, personally performed the services described in this documentation, as scribed by the above signed scribe in my presence. It is both accurate and complete to my knowledge. I agree with the details independently gathered by the clinical support staff, while the remaining scribed note accurately describes my personal service to the patient.          All questions and concerns were addressed to the patient/family. Alternatives to my treatment were discussed. GUMARO VicetneC. Electrophysiology  [facility] [address] [facility]. [address].   [place], [address]  Phone: [phone]  Fax: [phone]                  Sincerely,        Trista Cabrera MD

## 2019-09-05 ENCOUNTER — TELEPHONE (OUTPATIENT)
Dept: ENDOCRINOLOGY | Age: 79
End: 2019-09-05

## 2019-09-12 NOTE — TELEPHONE ENCOUNTER
Patients  called and said they were told that a rep would be contacting them about the [employer] and they have not heard anything.  The patient would like a call in regrads to this

## 2019-10-01 ENCOUNTER — OFFICE VISIT (OUTPATIENT)
Dept: ENDOCRINOLOGY | Age: 79
End: 2019-10-01
Payer: MEDICARE

## 2019-10-01 VITALS
OXYGEN SATURATION: 92 % | BODY MASS INDEX: 33.8 KG/M2 | HEIGHT: 64 IN | HEART RATE: 78 BPM | WEIGHT: 198 LBS | SYSTOLIC BLOOD PRESSURE: 136 MMHG | DIASTOLIC BLOOD PRESSURE: 72 MMHG

## 2019-10-01 DIAGNOSIS — E78.5 HYPERLIPIDEMIA, UNSPECIFIED HYPERLIPIDEMIA TYPE: ICD-10-CM

## 2019-10-01 DIAGNOSIS — E55.9 VITAMIN D DEFICIENCY: ICD-10-CM

## 2019-10-01 DIAGNOSIS — I10 ESSENTIAL HYPERTENSION: ICD-10-CM

## 2019-10-01 DIAGNOSIS — E03.9 ACQUIRED HYPOTHYROIDISM: ICD-10-CM

## 2019-10-01 DIAGNOSIS — E11.00 DM HYPEROSMOLARITY TYPE II, UNCONTROLLED (HCC): Primary | ICD-10-CM

## 2019-10-01 DIAGNOSIS — E11.65 DM HYPEROSMOLARITY TYPE II, UNCONTROLLED (HCC): Primary | ICD-10-CM

## 2019-10-01 LAB
T4 FREE: 1.6 NG/DL (ref 0.9–1.8)
TSH SERPL DL<=0.05 MIU/L-ACNC: 1.45 UIU/ML (ref 0.27–4.2)
VITAMIN D 25-HYDROXY: 30.8 NG/ML

## 2019-10-01 PROCEDURE — 99214 OFFICE O/P EST MOD 30 MIN: CPT | Performed by: NURSE PRACTITIONER

## 2019-10-01 RX ORDER — ERGOCALCIFEROL (VITAMIN D2) 1250 MCG
50000 CAPSULE ORAL WEEKLY
Qty: 12 CAPSULE | Refills: 1 | Status: SHIPPED | OUTPATIENT
Start: 2019-10-01 | End: 2020-03-04 | Stop reason: ALTCHOICE

## 2019-10-01 ASSESSMENT — ENCOUNTER SYMPTOMS
NAUSEA: 0
SHORTNESS OF BREATH: 0
CONSTIPATION: 0
DIARRHEA: 0
COLOR CHANGE: 0
EYE PAIN: 0

## 2019-10-02 ENCOUNTER — TELEPHONE (OUTPATIENT)
Dept: ENDOCRINOLOGY | Age: 79
End: 2019-10-02

## 2019-11-05 DIAGNOSIS — E11.00 DM HYPEROSMOLARITY TYPE II, UNCONTROLLED (HCC): ICD-10-CM

## 2019-11-05 DIAGNOSIS — E11.65 DM HYPEROSMOLARITY TYPE II, UNCONTROLLED (HCC): ICD-10-CM

## 2019-11-08 RX ORDER — FLASH GLUCOSE SENSOR
1 KIT MISCELLANEOUS
Qty: 2 EACH | Refills: 5 | Status: SHIPPED | OUTPATIENT
Start: 2019-11-08 | End: 2020-03-03

## 2019-11-14 RX ORDER — BLOOD SUGAR DIAGNOSTIC
1 STRIP MISCELLANEOUS 3 TIMES DAILY
Qty: 270 EACH | Refills: 3 | Status: SHIPPED
Start: 2019-11-14 | End: 2020-03-04 | Stop reason: ALTCHOICE

## 2019-11-14 RX ORDER — FLASH GLUCOSE SENSOR
1 KIT MISCELLANEOUS
Qty: 6 EACH | Refills: 5 | Status: SHIPPED | OUTPATIENT
Start: 2019-11-14 | End: 2020-03-03

## 2019-12-03 ENCOUNTER — OFFICE VISIT (OUTPATIENT)
Dept: ENDOCRINOLOGY | Age: 79
End: 2019-12-03
Payer: MEDICARE

## 2019-12-03 VITALS
HEIGHT: 64 IN | HEART RATE: 95 BPM | RESPIRATION RATE: 18 BRPM | OXYGEN SATURATION: 95 % | BODY MASS INDEX: 33.63 KG/M2 | WEIGHT: 197 LBS | SYSTOLIC BLOOD PRESSURE: 136 MMHG | DIASTOLIC BLOOD PRESSURE: 78 MMHG

## 2019-12-03 DIAGNOSIS — E11.00 DM HYPEROSMOLARITY TYPE II, UNCONTROLLED (HCC): Primary | ICD-10-CM

## 2019-12-03 DIAGNOSIS — E11.65 DM HYPEROSMOLARITY TYPE II, UNCONTROLLED (HCC): Primary | ICD-10-CM

## 2019-12-03 LAB — HBA1C MFR BLD: 9 %

## 2019-12-03 PROCEDURE — 83036 HEMOGLOBIN GLYCOSYLATED A1C: CPT | Performed by: NURSE PRACTITIONER

## 2019-12-03 PROCEDURE — 99213 OFFICE O/P EST LOW 20 MIN: CPT | Performed by: NURSE PRACTITIONER

## 2019-12-03 ASSESSMENT — ENCOUNTER SYMPTOMS
SHORTNESS OF BREATH: 0
CONSTIPATION: 0
COLOR CHANGE: 0
DIARRHEA: 0
EYE PAIN: 0
NAUSEA: 0

## 2019-12-10 ENCOUNTER — TELEPHONE (OUTPATIENT)
Dept: ENDOCRINOLOGY | Age: 79
End: 2019-12-10

## 2019-12-13 ENCOUNTER — TELEPHONE (OUTPATIENT)
Dept: ENDOCRINOLOGY | Age: 79
End: 2019-12-13

## 2019-12-13 DIAGNOSIS — E11.65 DM HYPEROSMOLARITY TYPE II, UNCONTROLLED (HCC): Primary | ICD-10-CM

## 2019-12-13 DIAGNOSIS — E11.00 DM HYPEROSMOLARITY TYPE II, UNCONTROLLED (HCC): Primary | ICD-10-CM

## 2019-12-16 ENCOUNTER — TELEPHONE (OUTPATIENT)
Dept: ENDOCRINOLOGY | Age: 79
End: 2019-12-16

## 2020-01-23 ENCOUNTER — TELEPHONE (OUTPATIENT)
Dept: ENDOCRINOLOGY | Age: 80
End: 2020-01-23

## 2020-01-24 RX ORDER — BLOOD-GLUCOSE METER
1 EACH MISCELLANEOUS DAILY
Qty: 1 KIT | Refills: 0 | Status: SHIPPED
Start: 2020-01-24 | End: 2020-03-04 | Stop reason: ALTCHOICE

## 2020-02-25 ENCOUNTER — TELEPHONE (OUTPATIENT)
Dept: ENDOCRINOLOGY | Age: 80
End: 2020-02-25

## 2020-02-25 NOTE — TELEPHONE ENCOUNTER
Patient is calling to request a prescription for the 14 day freestyle kimi sensors.   Please send prescription to    [facility]  [phone]

## 2020-02-25 NOTE — TELEPHONE ENCOUNTER
Patients  called back returning a phone call. I read him the message and he stated when he gets home he will call them again. He said they tried to call but could not get through.

## 2020-03-03 ENCOUNTER — OFFICE VISIT (OUTPATIENT)
Dept: ENDOCRINOLOGY | Age: 80
End: 2020-03-03
Payer: MEDICARE

## 2020-03-03 VITALS
RESPIRATION RATE: 14 BRPM | OXYGEN SATURATION: 97 % | SYSTOLIC BLOOD PRESSURE: 124 MMHG | HEART RATE: 74 BPM | HEIGHT: 64 IN | BODY MASS INDEX: 33.63 KG/M2 | WEIGHT: 197 LBS | DIASTOLIC BLOOD PRESSURE: 82 MMHG

## 2020-03-03 PROCEDURE — 99213 OFFICE O/P EST LOW 20 MIN: CPT | Performed by: NURSE PRACTITIONER

## 2020-03-03 RX ORDER — SYRINGE AND NEEDLE,INSULIN,1ML 31 GX5/16"
SYRINGE, EMPTY DISPOSABLE MISCELLANEOUS
Refills: 3 | COMMUNITY
Start: 2019-12-13 | End: 2020-03-04 | Stop reason: ALTCHOICE

## 2020-03-03 RX ORDER — LEVOTHYROXINE SODIUM 112 UG/1
112 TABLET ORAL DAILY
Status: ON HOLD | COMMUNITY
Start: 2020-02-26 | End: 2020-11-27

## 2020-03-03 RX ORDER — NYSTATIN 100000 U/G
CREAM TOPICAL
COMMUNITY
Start: 2020-02-10

## 2020-03-03 ASSESSMENT — ENCOUNTER SYMPTOMS
NAUSEA: 0
SHORTNESS OF BREATH: 0
DIARRHEA: 0
EYE PAIN: 0
COLOR CHANGE: 0
CONSTIPATION: 0

## 2020-03-03 NOTE — PROGRESS NOTES
Endocrinology  Gerardo Allen, GAYATHRI, [address] [facility] [address] [facility], [address] [place] [address] Ave  Phone [phone]  Fax [phone]    Ty Vasquez is a 79 y.o. female who is a new patient presenting for management of Diabetes Mellitus Type 2. Last A1C:   Lab Results   Component Value Date    LABA1C 9.0 12/03/2019    LABA1C 8.7 08/10/2019    LABA1C 9.0 08/09/2019     Last BP Readings:  BP Readings from Last 3 Encounters:   03/04/20 122/62   03/03/20 124/82   12/03/19 136/78     Last LDL:   Lab Results   Component Value Date    LDLCALC 60 07/23/2018    LDLDIRECT 125 (H) 07/02/2015     Aspirin Use: no    Tobacco/Alcohol History:    Smoking status: Never Smoker    Smokeless tobacco: Never Used    Alcohol use: No     Diabetes:  Tyler Yung  was diagnosed with diabetes type 2 in : > 30 years   On insulin: > 25 years   Patient reports that diabetes is generally not well controlled.  Disease course has been variable    Current microvascular complications include none.  Current Macrovascular complications include Stroke, CVD, CAD.  Patient reports compliance for about 80% of the time and adheres to medication, but usually not to diet and exercise instructions.  There have been no recent hospital or ED admissions for hypoglycemia, hyperglycemia or DKA.   Aristides Long Other ED visit include for : chest pain    PMH includes  Past Medical History:   Diagnosis Date    Atrial fibrillation ([address]     Cancer ([address]     Cerebral artery occlusion with cerebral infarction ([address]     left sided weakness-left arm and left leg, also left eye    Diabetes mellitus ([address]     Fractures     Hypertension     Thyroid disease      Blood glucose trends:  Patient brought log glucometer for download: no  Checks blood sugar 4 times --> 3 times  BG Range: high 200 -  low 300s   Hypoglycemia awareness and symptoms: 69 , hypoglcyemia unawareness at times==> 50  Has not done CGM    Current Medication regimen:   Insulin 70/30 : 30-40 BID--> 44/46   Insulin R: 13-16 units PRN at lunch     Previous  Levemir 36 units BID  Other meds tried in past: lantus, humalog, insulin R, NPH  GFR > 60    Current Dietary regimen:   Has recently improved her diet  BF: sausage and egg,banana coffee  Lunch: cabbage/cauliflower. Protein portions-> lunchmeat sw,   Supper: stew, protein,   Snacks: none  Beverages: coffee, diet soda, water  Average carbs per day    Microvascular Complications:  · Neuropathy: denies concerns, post stroke for 4years, left sided paraplegia  · Retinopathy: blurry vision, denies any diabetic eye changes  · Nephropathy: no recent MACR    Diabetic Health Maintenance   · Last Eye Exam: > 1 year  · Last Foot exam: none recently  · Has patient seen a dietitian? Yes  · Current Exercise: No structured exercise  · On ACEI or ARB: lisinopril 40 mg QD  · On statin: Crestor 10 mg QD    Hyperlipidemia: Current complaints include occasional myalgias but otherwise tolerates well. Lab Results   Component Value Date    CHOL 132 07/23/2018    CHOL 211 12/05/2016    CHOL 208 10/07/2016     Lab Results   Component Value Date    TRIG 125 07/23/2018    TRIG 208 12/05/2016    TRIG 150 10/07/2016     Lab Results   Component Value Date    HDL 47 07/23/2018    HDL 43 12/05/2016    HDL 53 10/07/2016     Lab Results   Component Value Date    LDLCALC 60 07/23/2018    LDLCALC 126 12/05/2016    [address] 125 10/07/2016     Lab Results   Component Value Date    LDLDIRECT 125 07/02/2015     No results found for: CHOLHDTESSA    Vitamin D deficiency: Currently is on no rx supplementation. Current complaints include fatigue on daily basis. Last vitamin Dlevel is:  Lab Results   Component Value Date    VITD25 30.8 10/01/2019    VITD25 25.3 11/20/2015    VITD25 27.7 02/13/2015       Hypertension  Elevated at visit. She denies symptoms of dizziness, light headedness. Occasional dependent edema. Tries to follow a salt restricted diet.    Lab Results   Component Value Date Height: 5' 4\" (1.626 m)     Physical Exam  Vitals signs reviewed. Constitutional:       Appearance: She is well-developed. Comments: Wheelchair bound   Eyes:      Pupils: Pupils are equal, round, and reactive to light. Neck:      Musculoskeletal: Normal range of motion. Thyroid: No thyromegaly. Cardiovascular:      Rate and Rhythm: Normal rate and regular rhythm. Heart sounds: Normal heart sounds. Pulmonary:      Effort: Pulmonary effort is normal.      Breath sounds: Normal breath sounds. Abdominal:      General: There is no distension. Musculoskeletal: Normal range of motion. Skin:     General: Skin is warm and dry. Comments: No skin changes or evidence of trauma. Neurological:      Mental Status: She is alert and oriented to person, place, and time. Sensory: No sensory deficit. Psychiatric:         Behavior: Behavior normal.         Thought Content: Thought content normal.       Skeletal foot exam: deferred    Assessment  Silas Duran is a 79 y.o. female with uncontrolled Diabetes Mellitus type 2 complicated by peripheral neuropathy and associated with HTN, HLD, Vitamin D deficiency, old CVA with hemiparesis     Plan  Problem List Items Addressed This Visit     DM hyperosmolarity type II, uncontrolled ([address] - Primary     Last A1c = 10   Goal is < 7%    Increase insulin / humulin 70/30 to 50 units twice a day  No change in dose of insulin R  Reviewed sliding scale    Discussed diet: increase protein, check carb counts   Reviewed carbohyrate and caloric restriction  Ensure good hydration and adequate electrolyte replacement      Diet education  1. Decrease rcarb consumption in beverage form: regular soda, fruit juices  2. Moderate protein and good fats are ok. 3. Increase fiber via vegetables. Limit fruit intake. 4. Eliminate use of sugar as far as possible, minimize  all artificial sweeteners. 5. Ensure good hydration: 80 -100 oz minimum  6.  Ensure electrolyte

## 2020-03-03 NOTE — PATIENT INSTRUCTIONS
Last A1c = 10   Goal is < 7%    Increase insulin / humulin 70/30 to 50 units twice a day  No change in dose of insulin R  Reviewed sliding scale    Discussed diet: increase protein, check carb counts   Reviewed carbohyrate and caloric restriction  Ensure good hydration and adequate electrolyte replacement

## 2020-03-04 ENCOUNTER — HOSPITAL ENCOUNTER (OUTPATIENT)
Age: 80
Setting detail: OBSERVATION
Discharge: HOME HEALTH | End: 2020-03-05
Attending: EMERGENCY MEDICINE | Admitting: FAMILY MEDICINE
Payer: MEDICARE

## 2020-03-04 ENCOUNTER — APPOINTMENT (OUTPATIENT)
Dept: GENERAL RADIOLOGY | Age: 80
End: 2020-03-04
Payer: MEDICARE

## 2020-03-04 LAB
A/G RATIO: 1.3 (ref 1.1–2.2)
ALBUMIN SERPL-MCNC: 4.1 G/DL (ref 3.4–5)
ALP BLD-CCNC: 62 U/L (ref 40–129)
ALT SERPL-CCNC: 24 U/L (ref 10–40)
ANION GAP SERPL CALCULATED.3IONS-SCNC: 11 MMOL/L (ref 3–16)
APTT: 42.3 SEC (ref 24.2–36.2)
AST SERPL-CCNC: 25 U/L (ref 15–37)
BASOPHILS ABSOLUTE: 0 K/UL (ref 0–0.2)
BASOPHILS RELATIVE PERCENT: 0.7 %
BILIRUB SERPL-MCNC: 0.5 MG/DL (ref 0–1)
BUN BLDV-MCNC: 22 MG/DL (ref 7–20)
CALCIUM SERPL-MCNC: 9 MG/DL (ref 8.3–10.6)
CHLORIDE BLD-SCNC: 103 MMOL/L (ref 99–110)
CO2: 27 MMOL/L (ref 21–32)
CREAT SERPL-MCNC: 0.7 MG/DL (ref 0.6–1.2)
EKG ATRIAL RATE: 76 BPM
EKG DIAGNOSIS: NORMAL
EKG P AXIS: 4 DEGREES
EKG P-R INTERVAL: 154 MS
EKG Q-T INTERVAL: 448 MS
EKG QRS DURATION: 132 MS
EKG QTC CALCULATION (BAZETT): 504 MS
EKG R AXIS: -13 DEGREES
EKG T AXIS: -24 DEGREES
EKG VENTRICULAR RATE: 76 BPM
EOSINOPHILS ABSOLUTE: 0.2 K/UL (ref 0–0.6)
EOSINOPHILS RELATIVE PERCENT: 3.5 %
GFR AFRICAN AMERICAN: >60
GFR NON-AFRICAN AMERICAN: >60
GLOBULIN: 3.2 G/DL
GLUCOSE BLD-MCNC: 170 MG/DL (ref 70–99)
GLUCOSE BLD-MCNC: 184 MG/DL (ref 70–99)
GLUCOSE BLD-MCNC: 212 MG/DL (ref 70–99)
GLUCOSE BLD-MCNC: 214 MG/DL (ref 70–99)
HCT VFR BLD CALC: 44.5 % (ref 36–48)
HEMOGLOBIN: 14.8 G/DL (ref 12–16)
INR BLD: 1.63 (ref 0.86–1.14)
LIPASE: 23 U/L (ref 13–60)
LYMPHOCYTES ABSOLUTE: 1.6 K/UL (ref 1–5.1)
LYMPHOCYTES RELATIVE PERCENT: 26.2 %
MCH RBC QN AUTO: 29.9 PG (ref 26–34)
MCHC RBC AUTO-ENTMCNC: 33.3 G/DL (ref 31–36)
MCV RBC AUTO: 89.9 FL (ref 80–100)
MONOCYTES ABSOLUTE: 0.5 K/UL (ref 0–1.3)
MONOCYTES RELATIVE PERCENT: 7.8 %
NEUTROPHILS ABSOLUTE: 3.7 K/UL (ref 1.7–7.7)
NEUTROPHILS RELATIVE PERCENT: 61.8 %
PDW BLD-RTO: 14 % (ref 12.4–15.4)
PERFORMED ON: ABNORMAL
PLATELET # BLD: 179 K/UL (ref 135–450)
PMV BLD AUTO: 9.1 FL (ref 5–10.5)
POTASSIUM REFLEX MAGNESIUM: 5 MMOL/L (ref 3.5–5.1)
PRO-BNP: 27 PG/ML (ref 0–449)
PROTHROMBIN TIME: 19 SEC (ref 10–13.2)
RBC # BLD: 4.95 M/UL (ref 4–5.2)
SODIUM BLD-SCNC: 141 MMOL/L (ref 136–145)
TOTAL PROTEIN: 7.3 G/DL (ref 6.4–8.2)
TROPONIN: <0.01 NG/ML
TROPONIN: <0.01 NG/ML
WBC # BLD: 6 K/UL (ref 4–11)

## 2020-03-04 PROCEDURE — 6370000000 HC RX 637 (ALT 250 FOR IP): Performed by: EMERGENCY MEDICINE

## 2020-03-04 PROCEDURE — 36415 COLL VENOUS BLD VENIPUNCTURE: CPT

## 2020-03-04 PROCEDURE — 83690 ASSAY OF LIPASE: CPT

## 2020-03-04 PROCEDURE — 6370000000 HC RX 637 (ALT 250 FOR IP): Performed by: STUDENT IN AN ORGANIZED HEALTH CARE EDUCATION/TRAINING PROGRAM

## 2020-03-04 PROCEDURE — 84484 ASSAY OF TROPONIN QUANT: CPT

## 2020-03-04 PROCEDURE — 71045 X-RAY EXAM CHEST 1 VIEW: CPT

## 2020-03-04 PROCEDURE — 80053 COMPREHEN METABOLIC PANEL: CPT

## 2020-03-04 PROCEDURE — 83880 ASSAY OF NATRIURETIC PEPTIDE: CPT

## 2020-03-04 PROCEDURE — 85025 COMPLETE CBC W/AUTO DIFF WBC: CPT

## 2020-03-04 PROCEDURE — 99285 EMERGENCY DEPT VISIT HI MDM: CPT

## 2020-03-04 PROCEDURE — 85610 PROTHROMBIN TIME: CPT

## 2020-03-04 PROCEDURE — 85730 THROMBOPLASTIN TIME PARTIAL: CPT

## 2020-03-04 PROCEDURE — 99204 OFFICE O/P NEW MOD 45 MIN: CPT | Performed by: INTERNAL MEDICINE

## 2020-03-04 PROCEDURE — G0378 HOSPITAL OBSERVATION PER HR: HCPCS

## 2020-03-04 PROCEDURE — 2580000003 HC RX 258: Performed by: STUDENT IN AN ORGANIZED HEALTH CARE EDUCATION/TRAINING PROGRAM

## 2020-03-04 PROCEDURE — 93005 ELECTROCARDIOGRAM TRACING: CPT | Performed by: EMERGENCY MEDICINE

## 2020-03-04 RX ORDER — WARFARIN SODIUM 7.5 MG/1
7.5 TABLET ORAL
Status: COMPLETED | OUTPATIENT
Start: 2020-03-04 | End: 2020-03-04

## 2020-03-04 RX ORDER — POLYETHYLENE GLYCOL 3350 17 G/17G
17 POWDER, FOR SOLUTION ORAL DAILY PRN
Status: DISCONTINUED | OUTPATIENT
Start: 2020-03-04 | End: 2020-03-05 | Stop reason: HOSPADM

## 2020-03-04 RX ORDER — NICOTINE POLACRILEX 4 MG
15 LOZENGE BUCCAL PRN
Status: DISCONTINUED | OUTPATIENT
Start: 2020-03-04 | End: 2020-03-05 | Stop reason: HOSPADM

## 2020-03-04 RX ORDER — ONDANSETRON 2 MG/ML
4 INJECTION INTRAMUSCULAR; INTRAVENOUS EVERY 6 HOURS PRN
Status: DISCONTINUED | OUTPATIENT
Start: 2020-03-04 | End: 2020-03-05 | Stop reason: HOSPADM

## 2020-03-04 RX ORDER — NITROGLYCERIN 0.4 MG/1
0.4 TABLET SUBLINGUAL EVERY 5 MIN PRN
Status: DISCONTINUED | OUTPATIENT
Start: 2020-03-04 | End: 2020-03-05 | Stop reason: HOSPADM

## 2020-03-04 RX ORDER — WARFARIN SODIUM 7.5 MG/1
7.5 TABLET ORAL
Status: ON HOLD | COMMUNITY
End: 2020-12-01 | Stop reason: HOSPADM

## 2020-03-04 RX ORDER — CHOLECALCIFEROL (VITAMIN D3) 125 MCG
1000 CAPSULE ORAL EVERY OTHER DAY
Status: DISCONTINUED | OUTPATIENT
Start: 2020-03-05 | End: 2020-03-05 | Stop reason: HOSPADM

## 2020-03-04 RX ORDER — ACETAMINOPHEN 650 MG/1
650 SUPPOSITORY RECTAL EVERY 6 HOURS PRN
Status: DISCONTINUED | OUTPATIENT
Start: 2020-03-04 | End: 2020-03-05 | Stop reason: HOSPADM

## 2020-03-04 RX ORDER — DEXTROSE MONOHYDRATE 50 MG/ML
100 INJECTION, SOLUTION INTRAVENOUS PRN
Status: DISCONTINUED | OUTPATIENT
Start: 2020-03-04 | End: 2020-03-05 | Stop reason: HOSPADM

## 2020-03-04 RX ORDER — SODIUM CHLORIDE 0.9 % (FLUSH) 0.9 %
10 SYRINGE (ML) INJECTION EVERY 12 HOURS SCHEDULED
Status: DISCONTINUED | OUTPATIENT
Start: 2020-03-04 | End: 2020-03-05 | Stop reason: HOSPADM

## 2020-03-04 RX ORDER — ACETAMINOPHEN 325 MG/1
650 TABLET ORAL EVERY 6 HOURS PRN
Status: DISCONTINUED | OUTPATIENT
Start: 2020-03-04 | End: 2020-03-05 | Stop reason: HOSPADM

## 2020-03-04 RX ORDER — OMEGA-3-ACID ETHYL ESTERS 1 G/1
1000 CAPSULE, LIQUID FILLED ORAL 2 TIMES DAILY
Status: DISCONTINUED | OUTPATIENT
Start: 2020-03-05 | End: 2020-03-05 | Stop reason: HOSPADM

## 2020-03-04 RX ORDER — HYDRALAZINE HYDROCHLORIDE 25 MG/1
25 TABLET, FILM COATED ORAL EVERY 6 HOURS PRN
Status: DISCONTINUED | OUTPATIENT
Start: 2020-03-04 | End: 2020-03-05 | Stop reason: HOSPADM

## 2020-03-04 RX ORDER — SODIUM CHLORIDE 0.9 % (FLUSH) 0.9 %
10 SYRINGE (ML) INJECTION PRN
Status: DISCONTINUED | OUTPATIENT
Start: 2020-03-04 | End: 2020-03-05 | Stop reason: HOSPADM

## 2020-03-04 RX ORDER — ASPIRIN 325 MG
325 TABLET ORAL ONCE
Status: COMPLETED | OUTPATIENT
Start: 2020-03-04 | End: 2020-03-04

## 2020-03-04 RX ORDER — LISINOPRIL 20 MG/1
40 TABLET ORAL DAILY
Status: DISCONTINUED | OUTPATIENT
Start: 2020-03-04 | End: 2020-03-05 | Stop reason: HOSPADM

## 2020-03-04 RX ORDER — ASPIRIN 81 MG/1
81 TABLET, CHEWABLE ORAL DAILY
Status: DISCONTINUED | OUTPATIENT
Start: 2020-03-05 | End: 2020-03-05 | Stop reason: HOSPADM

## 2020-03-04 RX ORDER — PROMETHAZINE HYDROCHLORIDE 25 MG/1
12.5 TABLET ORAL EVERY 6 HOURS PRN
Status: DISCONTINUED | OUTPATIENT
Start: 2020-03-04 | End: 2020-03-05 | Stop reason: HOSPADM

## 2020-03-04 RX ORDER — DEXTROSE MONOHYDRATE 25 G/50ML
12.5 INJECTION, SOLUTION INTRAVENOUS PRN
Status: DISCONTINUED | OUTPATIENT
Start: 2020-03-04 | End: 2020-03-05 | Stop reason: HOSPADM

## 2020-03-04 RX ORDER — ROSUVASTATIN CALCIUM 10 MG/1
10 TABLET, COATED ORAL DAILY
Status: DISCONTINUED | OUTPATIENT
Start: 2020-03-04 | End: 2020-03-05 | Stop reason: HOSPADM

## 2020-03-04 RX ORDER — ATORVASTATIN CALCIUM 40 MG/1
40 TABLET, FILM COATED ORAL NIGHTLY
Status: DISCONTINUED | OUTPATIENT
Start: 2020-03-04 | End: 2020-03-05 | Stop reason: HOSPADM

## 2020-03-04 RX ORDER — LEVOTHYROXINE SODIUM 112 UG/1
112 TABLET ORAL DAILY
Status: DISCONTINUED | OUTPATIENT
Start: 2020-03-04 | End: 2020-03-05 | Stop reason: HOSPADM

## 2020-03-04 RX ORDER — NYSTATIN 100000 U/G
CREAM TOPICAL DAILY
Status: DISCONTINUED | OUTPATIENT
Start: 2020-03-04 | End: 2020-03-05 | Stop reason: HOSPADM

## 2020-03-04 RX ADMIN — NITROGLYCERIN 0.4 MG: 0.4 TABLET, ORALLY DISINTEGRATING SUBLINGUAL at 09:09

## 2020-03-04 RX ADMIN — LISINOPRIL 40 MG: 20 TABLET ORAL at 18:22

## 2020-03-04 RX ADMIN — INSULIN LISPRO 1 UNITS: 100 INJECTION, SOLUTION INTRAVENOUS; SUBCUTANEOUS at 21:49

## 2020-03-04 RX ADMIN — NYSTATIN: 100000 CREAM TOPICAL at 14:57

## 2020-03-04 RX ADMIN — ROSUVASTATIN CALCIUM 10 MG: 10 TABLET, FILM COATED ORAL at 14:47

## 2020-03-04 RX ADMIN — INSULIN LISPRO 1 UNITS: 100 INJECTION, SOLUTION INTRAVENOUS; SUBCUTANEOUS at 14:48

## 2020-03-04 RX ADMIN — HYDRALAZINE HYDROCHLORIDE 25 MG: 25 TABLET, FILM COATED ORAL at 20:12

## 2020-03-04 RX ADMIN — ASPIRIN 325 MG ORAL TABLET 325 MG: 325 PILL ORAL at 09:08

## 2020-03-04 RX ADMIN — WARFARIN SODIUM 7.5 MG: 7.5 TABLET ORAL at 21:49

## 2020-03-04 RX ADMIN — Medication 10 ML: at 20:13

## 2020-03-04 RX ADMIN — NITROGLYCERIN 0.4 MG: 0.4 TABLET, ORALLY DISINTEGRATING SUBLINGUAL at 09:19

## 2020-03-04 RX ADMIN — LEVOTHYROXINE SODIUM 112 MCG: 112 TABLET ORAL at 14:47

## 2020-03-04 RX ADMIN — ATORVASTATIN CALCIUM 40 MG: 40 TABLET, FILM COATED ORAL at 20:12

## 2020-03-04 RX ADMIN — INSULIN LISPRO 2 UNITS: 100 INJECTION, SOLUTION INTRAVENOUS; SUBCUTANEOUS at 17:31

## 2020-03-04 ASSESSMENT — ENCOUNTER SYMPTOMS
PHOTOPHOBIA: 0
DIARRHEA: 0
SHORTNESS OF BREATH: 0
RHINORRHEA: 0
COUGH: 0
WHEEZING: 0
NAUSEA: 0
ABDOMINAL DISTENTION: 0
BACK PAIN: 0
VOMITING: 0

## 2020-03-04 ASSESSMENT — PAIN DESCRIPTION - DESCRIPTORS
DESCRIPTORS: PRESSURE
DESCRIPTORS: PRESSURE

## 2020-03-04 ASSESSMENT — PAIN SCALES - GENERAL
PAINLEVEL_OUTOF10: 0
PAINLEVEL_OUTOF10: 10
PAINLEVEL_OUTOF10: 3

## 2020-03-04 ASSESSMENT — PAIN DESCRIPTION - LOCATION
LOCATION: CHEST
LOCATION: CHEST

## 2020-03-04 ASSESSMENT — PAIN DESCRIPTION - PAIN TYPE
TYPE: ACUTE PAIN
TYPE: ACUTE PAIN

## 2020-03-04 NOTE — CONSULTS
[address] [address]                                  CONSULTATION    PATIENT NAME: Olga Diaz                       :        1940  MED REC NO:   [id number]                          ROOM:       0206  ACCOUNT NO:   [de-identified]                           ADMIT DATE: 2020  PROVIDER:     Derek Vaz. Shawanda Alston MD    CONSULT DATE:  2020    REASON FOR CONSULTATION:  Chest pain. HISTORY OF PRESENT ILLNESS:  An 65-year-old female presented to the  hospital with complaints of chest pain. She states she has been having  this chest pain for years. It waxes and wanes in severity. It has been  worse over the past several months. She gets it every 1 to 2 weeks. At  times, feels like a pain, sometimes like a pressure in the center of her  chest.  It does not radiate. She gets it almost invariably at night  when she is in bed. It is improved when she gets up out of bed. It can  last for hours of the time. She does not have associated shortness of  breath. The pain is nonexertional.  It is not pleuritic. It varies in  severity. PAST MEDICAL HISTORY:  1. Coronary artery disease. Cardiac catheterization in  that  showed mild nonobstructive disease. She had Myoview stress test in  2019 for evaluation of similar chest pain that she is having now that  showed no ischemia. 2.  Dilated aortic arch at 3.9 cm.  3.  Hypertension. 4.  Paroxysmal atrial fibrillation for which she is followed by  Electrophysiology and is on chronic anticoagulation. 5.  Right bundle-branch block. 6.  History of CVA which has left her with severe left-sided weakness. SOCIAL HISTORY:  She is . She does not smoke. FAMILY HISTORY:  Positive for heart disease. REVIEW OF SYSTEMS:  No fevers or chills. No cough. No new focal  neurologic symptoms. No headache or visual changes. No recent GI or   bleeding.   No recent or upcoming surgeries. She is very limited by her  stroke and left-sided weakness. She occasionally can move around with a  walker, but is very inactive. ALLERGIES:  IV CONTRAST. MEDICATIONS:  See list in the chart which I have reviewed. PHYSICAL EXAMINATION:  VITAL SIGNS:  Blood pressure is 141/62, heart rate 88, respirations 18,  temperature 97.4. She is 94% saturating on room air. GENERAL:  Well-developed, well-nourished white female, in no acute  distress. HEENT:  Normocephalic, atraumatic. Oropharynx clear. Moist mucous  membranes. NECK:  Supple. CHEST:  Clear. CARDIAC:  Regular S1 and S2. There is no S3 or S4 gallop. There is no  significant murmur. There is no rub. There is no gallop. Jugular  venous pressure is normal.  Carotids 2+, symmetric without bruit. ABDOMEN:  Soft, nontender. Positive bowel sounds. EXTREMITIES:  No cyanosis or edema. NEUROLOGIC:  She has severe left-sided weakness. SKIN:  Warm and dry. PSYCHIATRY:  Affect calm. DIAGNOSTIC DATA:  EKG, sinus rhythm, right bundle-branch block. Troponin less than 0.01. Chest x-ray negative for pulmonary edema. IMPRESSION:  1. Atypical chest pain without objective evidence of acute coronary  syndrome. Her description suggests the pain is inconsistent with  cardiac etiology. 2.  Paroxysmal atrial fibrillation, currently stable in sinus rhythm. 3.  Right bundle-branch block. 4.  Hypertension, suboptimal.  5.  Coronary artery disease, mild, nonobstructive by cardiac  catheterization in 2012. Normal stress test in 08/2019.  6.  History of CVA with residual left-sided severe weakness. RECOMMENDATIONS:  1. No further cardiac testing or change to therapy is recommended at  this time. 2.  Defer management with hypertension to primary care. FREDERIC Crowell MD    D: 03/04/2020 15:14:01       T: 03/04/2020 16:52:46     [email or web address]  Job#: [id number]     Doc#: [id number]    CC:

## 2020-03-04 NOTE — ED PROVIDER NOTES
Emergency Department Provider Note  Location: [address] [place] [address]  ED  3/4/2020     Patient Identification  Berneta Boeck is a 79 y.o. female    Chief Complaint  Chest Pain (patient states she feels like 100lbs is sitting on her chest, states it was worse last night and it went away when she went to bed. states it came back this AM but went away when she got here. history of afib noted. states she gets sob with this feeling)          HPI  (History provided by patient)  Patient is an 79-year-old female history of A. fib on Coumadin, previous CVA, hypertension, diabetes, who presents with pressure-like chest pain started at 9 PM yesterday. Patient reports she has been having pain feels like \"someone sitting on my chest\". Is been intermittent for the past week or so. Had an episode yesterday that lasted for a few hours and went away. However started 9 PM when she was lying down and persisted throughout most of the night. It is somewhat improved now however she still feels a residual sensation in the center of her chest.  No diaphoresis no nausea no other autonomic symptoms described. No shortness of breath no cough sputum production fever chills no leg pain leg swelling history of DVT or PE. I have reviewed the following nursing documentation:  Allergies: Allergies   Allergen Reactions    Contrast [Iodides] Shortness Of Breath       Past medical history:  has a past medical history of Atrial fibrillation ([address] Cancer ([address] Cerebral artery occlusion with cerebral infarction ([address] Diabetes mellitus ([address] Fractures, Hypertension, and Thyroid disease. Past surgical history:  has a past surgical history that includes Thyroidectomy; Hysterectomy; Cholecystectomy; cyst removal; Hand surgery; Ankle fracture surgery (2019); Hand surgery (2/21/12); and Ankle surgery (Left, 6/17/2014). Home medications:   Prior to Admission medications    Medication Sig Start Date End Date Taking?  Authorizing Provider   nystatin (MYCOSTATIN) 615526 UNIT/GM cream  2/10/20   Historical Provider, MD   levothyroxine (SYNTHROID) 112 MCG tablet  2/26/20   Historical Provider, MD [place] 1ML/31G 31G X 5/16\" 1 ML MISC USE 1 SYRINGE SUBCUTANEOUSLY ONCE DAILY 12/13/19   Historical Provider, MD   Blood Glucose Monitoring Suppl (CONTOUR NEXT MONITOR) w/Device KIT 1 Units by Does not apply route daily 1/24/20   ANANT Campos CNP   blood glucose test strips (CONTOUR NEXT TEST) strip 1 each by In Vitro route daily As needed.  1/24/20   ANANT Campos CNP   Insulin Syringes, Disposable, U-100 1 ML MISC 1 each by Does not apply route daily 12/13/19   ANANT Campos CNP   Semaglutide,0.25 or 0.5MG/DOS, (OZEMPIC, 0.25 OR 0.5 MG/DOSE,) 2 MG/1.5ML SOPN Inject 25 mg into the skin once a week 12/13/19   ANANT Campos CNP   Insulin Pen Needle (PEN NEEDLES) 32G X 6 MM MISC 1 each by Does not apply route 3 times daily 11/14/19   Heather Tang MD   Insulin Pen Needle 32G X 6 MM MISC 1 each by Does not apply route three times daily 11/8/19   ANANT Campos CNP   Insulin NPH Isophane & Regular (HUMULIN 70/30 KWIKPEN) (70-30) 100 UNIT per ML injection pen Inject 70 Units into the skin 2 times daily 10/1/19   ANANT Campos CNP   ergocalciferol (DRISDOL) 09975 units capsule Take 1 capsule by mouth once a week 10/1/19 10/31/19  ANANT Campos CNP   rosuvastatin (CRESTOR) 10 MG tablet Take 1 tablet by mouth daily 7/25/18   ANANT Lisa CNP   Insulin Detemir (LEVEMIR FLEXPEN SC) Inject 45 Units into the skin nightly     Historical Provider, MD   lisinopril (PRINIVIL;ZESTRIL) 40 MG tablet Take 40 mg by mouth daily    Historical Provider, MD   magnesium (MAGNESIUM-OXIDE) 250 MG TABS tablet Take 250 mg by mouth every other day    Historical Provider, MD   Omega-3 Fatty Acids (FISH OIL) 1200 MG CAPS Take 1 capsule by mouth every other day     Historical Provider, MD   vitamin B-12 Cardiovascular:      Rate and Rhythm: Normal rate and regular rhythm. Heart sounds: No murmur. Comments: Equal radial pulses. Pulmonary:      Effort: Pulmonary effort is normal.      Breath sounds: Normal breath sounds. Comments: No chest wall tenderness or crepitus. Abdominal:      General: There is no distension. Palpations: Abdomen is soft. Tenderness: There is no abdominal tenderness. Musculoskeletal: Normal range of motion. General: No deformity. Skin:     General: Skin is warm. Findings: No rash. Neurological:      Mental Status: She is alert and oriented to person, place, and time. Motor: No abnormal muscle tone. Psychiatric:         Behavior: Behavior normal.           ED Course    ED Medication Orders (From admission, onward)    Start Ordered     Status Ordering Provider    03/04/20 0900 03/04/20 0848  aspirin tablet 325 mg  ONCE      Ordered SIRISHA WILLSON    03/04/20 0847 03/04/20 0848  nitroGLYCERIN (NITROSTAT) SL tablet 0.4 mg  EVERY 5 MIN PRN      Ordered SIRISHA WILLSON          EKG  Normal sinus rhythm, rate 75, left axis deviation, right bundle branch block with suspected left anterior fascicular block, nonspecific ST changes noted in the lateral leads and lead I however appears similar to prior EKG dated 9/4/2019. Radiology  Xr Chest Portable    Result Date: 3/4/2020  EXAMINATION: ONE XRAY VIEW OF THE CHEST 3/4/2020 8:12 am COMPARISON: 08/09/2019 HISTORY: ORDERING SYSTEM PROVIDED HISTORY: CP TECHNOLOGIST PROVIDED HISTORY: Reason for exam:->CP FINDINGS: Stable mild cardiomegaly. Normal pulmonary vasculature. No focal consolidation, pleural effusion, or pneumothorax. No evidence of acute process.          Labs  Results for orders placed or performed during the hospital encounter of 03/04/20   CBC Auto Differential   Result Value Ref Range    WBC 6.0 4.0 - 11.0 K/uL    RBC 4.95 4.00 - 5.20 M/uL    Hemoglobin 14.8 12.0 - 16.0 g/dL Hematocrit 44.5 36.0 - 48.0 %    MCV 89.9 80.0 - 100.0 fL    MCH 29.9 26.0 - 34.0 pg    MCHC 33.3 31.0 - 36.0 g/dL    RDW 14.0 12.4 - 15.4 %    Platelets 199 714 - 557 K/uL    MPV 9.1 5.0 - 10.5 fL    Neutrophils % 61.8 %    Lymphocytes % 26.2 %    Monocytes % 7.8 %    Eosinophils % 3.5 %    Basophils % 0.7 %    Neutrophils Absolute 3.7 1.7 - 7.7 K/uL    Lymphocytes Absolute 1.6 1.0 - 5.1 K/uL    Monocytes Absolute 0.5 0.0 - 1.3 K/uL    Eosinophils Absolute 0.2 0.0 - 0.6 K/uL    Basophils Absolute 0.0 0.0 - 0.2 K/uL   Comprehensive Metabolic Panel w/ Reflex to MG   Result Value Ref Range    Sodium 141 136 - 145 mmol/L    Potassium reflex Magnesium 5.0 3.5 - 5.1 mmol/L    Chloride 103 99 - 110 mmol/L    CO2 27 21 - 32 mmol/L    Anion Gap 11 3 - 16    Glucose 212 (H) 70 - 99 mg/dL    BUN 22 (H) 7 - 20 mg/dL    CREATININE 0.7 0.6 - 1.2 mg/dL    GFR Non-African American >60 >60    GFR African American >60 >60    Calcium 9.0 8.3 - 10.6 mg/dL    Total Protein 7.3 6.4 - 8.2 g/dL    Alb 4.1 3.4 - 5.0 g/dL    Albumin/Globulin Ratio 1.3 1.1 - 2.2    Total Bilirubin 0.5 0.0 - 1.0 mg/dL    Alkaline Phosphatase 62 40 - 129 U/L    ALT 24 10 - 40 U/L    AST 25 15 - 37 U/L    Globulin 3.2 g/dL   Lipase   Result Value Ref Range    Lipase 23.0 13.0 - 60.0 U/L   Troponin   Result Value Ref Range    Troponin <0.01 <0.01 ng/mL   Brain Natriuretic Peptide   Result Value Ref Range    Pro-BNP 27 0 - 449 pg/mL   Protime-INR   Result Value Ref Range    Protime 19.0 (H) 10.0 - 13.2 sec    INR 1.63 (H) 0.86 - 1.14   APTT   Result Value Ref Range    aPTT 42.3 (H) 24.2 - 36.2 sec   EKG 12 Lead   Result Value Ref Range    Ventricular Rate 76 BPM    Atrial Rate 76 BPM    P-R Interval 154 ms    QRS Duration 132 ms    Q-T Interval 448 ms    QTc Calculation (Bazett) 504 ms    P Axis 4 degrees    R Axis -13 degrees    T Axis -24 degrees    Diagnosis       Normal sinus rhythmRight bundle branch blockInferior infarct , age undeterminedAbnormal ECGWhen

## 2020-03-04 NOTE — ED NOTES
Home medications reviewed with patient's . Samson served admission provider to review.       Jane Arevalo RN  03/04/20 8402

## 2020-03-04 NOTE — DISCHARGE SUMMARY
contributing to symptoms. Patient reports that she is already on an antacid. Discharged in stable condition. Physical Exam Performed:   BP (!) 189/84   Pulse 79   Temp 97.5 °F (36.4 °C) (Oral)   Resp 18   Ht 5' 3\" (1.6 m)   Wt 198 lb (89.8 kg)   SpO2 92%   BMI 35.07 kg/m²     General appearance:  No apparent distress, appears stated age and cooperative. HEENT:  Normal cephalic, atraumatic without obvious deformity. Pupils equal, round, and reactive to light. Extra ocular muscles intact. Conjunctivae/corneas clear. Neck: Supple, with full range of motion. No jugular venous distention. Trachea midline. Respiratory:  Normal respiratory effort. Clear to auscultation, bilaterally without Rales/Wheezes/Rhonchi. Cardiovascular:  Regular rate and rhythm with normal S1/S2 without murmurs, rubs or gallops. Symptoms slightly reproducible on exam.  Abdomen: Soft, non-tender, non-distended with normal bowel sounds. Musculoskeletal:  No clubbing, cyanosis or edema bilaterally. Full range of motion without deformity. Skin: Skin color, texture, turgor normal.  No rashes or lesions. Neurologic:  Neurovascularly intact without any focal sensory/motor deficits. Cranial nerves: II-XII intact, grossly non-focal.  Psychiatric:  Alert and oriented, thought content appropriate, normal insight    Labs: For convenience and continuity at follow-up the following most recent labs are provided:  CBC:    Lab Results   Component Value Date    WBC 6.0 03/04/2020    HGB 14.8 03/04/2020    HCT 44.5 03/04/2020     03/04/2020     Renal:    Lab Results   Component Value Date     03/04/2020    K 5.0 03/04/2020     03/04/2020    CO2 27 03/04/2020    BUN 22 03/04/2020    CREATININE 0.7 03/04/2020    CALCIUM 9.0 03/04/2020     Significant Diagnostic Studies  Radiology:   XR CHEST PORTABLE   Final Result   No evidence of acute process.            Consults:   IP CONSULT TO HOSPITALIST  IP CONSULT TO PHARMACY  IP CONSULT TO CARDIOLOGY    Disposition:  Home     Condition at Discharge: Stable    Discharge Instructions/Follow-up:  Follow up with PCP in 1 week. Code Status:  Full Code     Activity: activity as tolerated    Diet: diabetic diet    Discharge Medications:     Current Discharge Medication List           Details   !! warfarin (COUMADIN) 7.5 MG tablet Take 7.5 mg by mouth Tues-Thur-Sat      insulin regular (HUMULIN R;NOVOLIN R) 100 UNIT/ML injection Inject 44-46 Units into the skin Daily with supper      insulin 70-30 (HUMULIN 70/30) (70-30) 100 UNIT per ML injection vial Inject 44-46 Units into the skin 2 times daily Morning and bedtime      levothyroxine (SYNTHROID) 112 MCG tablet Take 112 mcg by mouth Daily       rosuvastatin (CRESTOR) 10 MG tablet Take 1 tablet by mouth daily  Qty: 90 tablet, Refills: 3      lisinopril (PRINIVIL;ZESTRIL) 40 MG tablet Take 40 mg by mouth daily      magnesium (MAGNESIUM-OXIDE) 250 MG TABS tablet Take 250 mg by mouth daily 3x/week      Omega-3 Fatty Acids (FISH OIL) 1200 MG CAPS Take 1 capsule by mouth 2 times daily       vitamin B-12 (CYANOCOBALAMIN) 1000 MCG tablet Take 1,000 mcg by mouth daily 3x/week      !! warfarin (COUMADIN) 5 MG tablet Take 5 mg by mouth daily M-W-F-Sunday      nystatin (MYCOSTATIN) 541154 UNIT/GM cream        !! - Potential duplicate medications found. Please discuss with provider. Time Spent on discharge is more than 1 hour in the examination, evaluation, counseling and review of medications and discharge plan. This patient was seen and evaluated with attending Dr. Barbara Tse and resident team.    Signed:  Uriah Beebe DO   3/4/2020    Thank you ANANT Rodriguez CNP for the opportunity to be involved in this patient's care. If you have any questions or concerns please feel free to contact me at [phone].

## 2020-03-04 NOTE — H&P
Inpatient Family Medicine Service   History & Physical        PCP: ANANT Mcclure - G[facility]    Date of Admission: 3/4/2020    Date of Service: Pt seen/examined on 3/4/2020 and placed in Observation. Chief Complaint:  Chest pressure    History Of Present Illness:    Cliff Montes De Oca is a 79 y.o. female who presented to [facility] with chest pressure. Denies pain but feels heavy substernal pressure \"like an 100-lb person laying on my chest\". Patient was resting in bed at onset of symptoms. Similar to previous episodes, which occur every 2-3 weeks. This episode started last night and persisted all night. Pressure would radiate over to left chest wall. Improved with getting up and ambulating but worse when laying back down. Denies any fevers, chills, nausea, vomiting, no GI symptoms. Has some chronic abdominal pain. Has some left leg swelling s/p CVA. Denies history of cardiac stent. Followed by Dr. Renetta Rosas in cardiology. Reported that she should present to the ED if she experiences any chest pain. Previously scheduled to see Dr. Renetta Rosas tomorrow. Last visit September 2019. In the ED, EKG with RBBB but no ST changes. Troponin negative x1. Given aspirin and nitroglycerin. Symptoms improved with nitroglycerin. Pain decreased to a 3-4/10.      Past Medical History:          Diagnosis Date    Atrial fibrillation ([address]     Cancer (HCC)     Cerebral artery occlusion with cerebral infarction ([address]     left sided weakness-left arm and left leg, also left eye    Diabetes mellitus ([address]     Fractures     Hypertension     Thyroid disease        Past Surgical History:          Procedure Laterality Date    ANKLE FRACTURE SURGERY  2019    left ankle    ANKLE SURGERY Left 6/17/2014    Removal painful hardware    CHOLECYSTECTOMY      CYST REMOVAL      chest x2, back x1    HAND SURGERY      right    HAND SURGERY  2/21/12    DUPUYTRENS RELEASE LEFT MIDDLE AND RING FINGERS INCLUDING    HYSTERECTOMY      Brother     High Blood Pressure Brother        REVIEW OF SYSTEMS:   Pertinent positives as noted in the HPI. All other systems reviewed and negative. PHYSICAL EXAM PERFORMED:    BP (!) 189/84   Pulse 79   Temp 97.5 °F (36.4 °C) (Oral)   Resp 18   Ht 5' 3\" (1.6 m)   Wt 198 lb (89.8 kg)   SpO2 92%   BMI 35.07 kg/m²     General appearance:  No apparent distress, appears stated age and cooperative. HEENT:  Normal cephalic, atraumatic without obvious deformity. Pupils equal, round, and reactive to light. Extra ocular muscles intact. Conjunctivae/corneas clear. Neck: Supple, with full range of motion. No jugular venous distention. Trachea midline. Respiratory:  Normal respiratory effort. Clear to auscultation, bilaterally without Rales/Wheezes/Rhonchi. Cardiovascular:  Regular rate and rhythm with normal S1/S2 without murmurs, rubs or gallops. Symptoms slightly reproducible on exam.  Abdomen: Soft, non-tender, non-distended with normal bowel sounds. Musculoskeletal:  No clubbing, cyanosis or edema bilaterally. Full range of motion without deformity. Skin: Skin color, texture, turgor normal.  No rashes or lesions. Neurologic:  Neurovascularly intact without any focal sensory/motor deficits.  Cranial nerves: II-XII intact, grossly non-focal.  Psychiatric:  Alert and oriented, thought content appropriate, normal insight    Labs:     Recent Labs     03/04/20  0801   WBC 6.0   HGB 14.8   HCT 44.5        Recent Labs     03/04/20  0801      K 5.0      CO2 27   BUN 22*   CREATININE 0.7   CALCIUM 9.0     Recent Labs     03/04/20  0801   AST 25   ALT 24   BILITOT 0.5   ALKPHOS 62     Recent Labs     03/04/20  0801   INR 1.63*     Recent Labs     03/04/20  0801 03/04/20  1233   TROPONINI <0.01 <0.01       Urinalysis:      Lab Results   Component Value Date    NITRU Negative 01/08/2019    WBCUA 3-5 07/15/2018    BACTERIA 1+ 04/30/2017    RBCUA 0-2 07/15/2018    BLOODU Negative 01/08/2019 SPECGRAV 1.025 01/08/2019    GLUCOSEU >=1000 01/08/2019     Radiology:   XR CHEST PORTABLE   Final Result   No evidence of acute process. ASSESSMENT:    Active Hospital Problems    Diagnosis Date Noted    Coronary artery disease involving native coronary artery of native heart without angina pectoris [I25.10]     Atypical chest pain [R07.89] 12/17/2012    DM hyperosmolarity type II, uncontrolled ([facility] [E11.00, E11.65] 12/17/2012       PLAN:  Chest pain in the setting of CAD and PAF  - Troponin negative x2  - Continue aspirin, statin  - Cardiology consulted  - NPO  - Monitor telemetry    Diabetes mellitus type 2  - Insulin 75/25 50u BID  - Insulin R 13 units at lunch  - Low dose SSI  - POCT glucose  - Hypoglycemia protocol    DVT Prophylaxis: Home warfarin    Diet: DIET CARB CONTROL; Carb Control: 4 carb choices (60 gms)/meal; No Caffeine    Code Status: Full Code    PT/OT Eval Status: N/A    Dispo - pending cardiology    This patient was seen and evaluated with the resident team and attending, Dr. Bruna Tyler.     Ron Milan DO

## 2020-03-04 NOTE — ED NOTES
Ps  [id number]  Re: admit.  Unstable angina per [email or web address] ([email or web address])     Sandy Europe Naegele  03/04/20 7714

## 2020-03-04 NOTE — CONSULTS
Pharmacy to Dose Warfarin    Goal INR range 2-3  Home Warfarin dose:alternates 5 mg and 7.5 mg daily    Date  INR  Warfarin  3/4                  1.63                  7.5 mg    Recommend Warfarin 7.5 mg tonight x1. Daily INR ordered. Rx will continue to manage therapy per consult order.     Mary Capellan PharmD 03/04/20 1:52 PM

## 2020-03-04 NOTE — FLOWSHEET NOTE
03/04/20 1242   Assessment   Charting Type Admission   Neurological   Neuro (WDL) WDL   Level of Consciousness 0   Swallow Screening   Is the patient able to remain alert for testing?  Yes   [place] Coma Scale   Eye Opening 4   Best Verbal Response 5   Best Motor Response 6   [place] Coma Scale Score 15   HEENT   HEENT (WDL) X  (dentures, glasses )   Respiratory   Respiratory (WDL) WDL   Gastrointestinal   Abdominal (WDL) WDL   Peripheral Vascular   Peripheral Vascular (WDL) X   LLE Edema +1   RUE Neurovascular Assessment   Capillary Refill Less than/equal to 3 seconds   LUE Neurovascular Assessment   Capillary Refill Less than/equal to 3 seconds   RLE Neurovascular Assessment   Capillary Refill Less than/equal to 3 seconds   LLE Neurovascular Assessment   Capillary Refill Less than/equal to 3 seconds   Skin Color/Condition   Skin Color/Condition (WDL) WDL   Skin Color Appropriate for ethnicity   Skin Integrity   Skin Integrity (WDL) WDL   Genitourinary   Genitourinary (WDL) WDL   Anus/Rectum   Anus/Rectum (WDL) WDL

## 2020-03-05 VITALS
OXYGEN SATURATION: 93 % | HEART RATE: 84 BPM | TEMPERATURE: 97.7 F | BODY MASS INDEX: 35.08 KG/M2 | WEIGHT: 198 LBS | RESPIRATION RATE: 16 BRPM | SYSTOLIC BLOOD PRESSURE: 158 MMHG | DIASTOLIC BLOOD PRESSURE: 78 MMHG | HEIGHT: 63 IN

## 2020-03-05 LAB
EKG ATRIAL RATE: 73 BPM
EKG DIAGNOSIS: NORMAL
EKG P AXIS: 41 DEGREES
EKG P-R INTERVAL: 150 MS
EKG Q-T INTERVAL: 476 MS
EKG QRS DURATION: 132 MS
EKG QTC CALCULATION (BAZETT): 524 MS
EKG R AXIS: -5 DEGREES
EKG T AXIS: -16 DEGREES
EKG VENTRICULAR RATE: 73 BPM
GLUCOSE BLD-MCNC: 196 MG/DL (ref 70–99)
GLUCOSE BLD-MCNC: 236 MG/DL (ref 70–99)
HCT VFR BLD CALC: 45 % (ref 36–48)
HEMOGLOBIN: 14.8 G/DL (ref 12–16)
INR BLD: 1.76 (ref 0.86–1.14)
MCH RBC QN AUTO: 29.5 PG (ref 26–34)
MCHC RBC AUTO-ENTMCNC: 32.9 G/DL (ref 31–36)
MCV RBC AUTO: 89.6 FL (ref 80–100)
PDW BLD-RTO: 13.8 % (ref 12.4–15.4)
PERFORMED ON: ABNORMAL
PERFORMED ON: ABNORMAL
PLATELET # BLD: 181 K/UL (ref 135–450)
PMV BLD AUTO: 9.4 FL (ref 5–10.5)
PROTHROMBIN TIME: 20.1 SEC (ref 10–13.2)
RBC # BLD: 5.03 M/UL (ref 4–5.2)
WBC # BLD: 6.1 K/UL (ref 4–11)

## 2020-03-05 PROCEDURE — 97530 THERAPEUTIC ACTIVITIES: CPT

## 2020-03-05 PROCEDURE — G0378 HOSPITAL OBSERVATION PER HR: HCPCS

## 2020-03-05 PROCEDURE — 85610 PROTHROMBIN TIME: CPT

## 2020-03-05 PROCEDURE — 97162 PT EVAL MOD COMPLEX 30 MIN: CPT

## 2020-03-05 PROCEDURE — 6370000000 HC RX 637 (ALT 250 FOR IP): Performed by: STUDENT IN AN ORGANIZED HEALTH CARE EDUCATION/TRAINING PROGRAM

## 2020-03-05 PROCEDURE — 36415 COLL VENOUS BLD VENIPUNCTURE: CPT

## 2020-03-05 PROCEDURE — 2580000003 HC RX 258: Performed by: STUDENT IN AN ORGANIZED HEALTH CARE EDUCATION/TRAINING PROGRAM

## 2020-03-05 PROCEDURE — 93005 ELECTROCARDIOGRAM TRACING: CPT | Performed by: STUDENT IN AN ORGANIZED HEALTH CARE EDUCATION/TRAINING PROGRAM

## 2020-03-05 PROCEDURE — 85027 COMPLETE CBC AUTOMATED: CPT

## 2020-03-05 PROCEDURE — 97116 GAIT TRAINING THERAPY: CPT

## 2020-03-05 RX ORDER — WARFARIN SODIUM 7.5 MG/1
7.5 TABLET ORAL
Status: DISCONTINUED | OUTPATIENT
Start: 2020-03-05 | End: 2020-03-05 | Stop reason: HOSPADM

## 2020-03-05 RX ORDER — AMLODIPINE BESYLATE 2.5 MG/1
2.5 TABLET ORAL DAILY
Status: DISCONTINUED | OUTPATIENT
Start: 2020-03-05 | End: 2020-03-05 | Stop reason: HOSPADM

## 2020-03-05 RX ORDER — AMLODIPINE BESYLATE 2.5 MG/1
2.5 TABLET ORAL DAILY
Qty: 30 TABLET | Refills: 0 | Status: SHIPPED | OUTPATIENT
Start: 2020-03-06 | End: 2020-03-10 | Stop reason: SDUPTHER

## 2020-03-05 RX ADMIN — HYDRALAZINE HYDROCHLORIDE 25 MG: 25 TABLET, FILM COATED ORAL at 10:14

## 2020-03-05 RX ADMIN — ROSUVASTATIN CALCIUM 10 MG: 10 TABLET, FILM COATED ORAL at 10:14

## 2020-03-05 RX ADMIN — INSULIN LISPRO 2 UNITS: 100 INJECTION, SOLUTION INTRAVENOUS; SUBCUTANEOUS at 10:16

## 2020-03-05 RX ADMIN — INSULIN HUMAN 13 UNITS: 100 INJECTION, SOLUTION PARENTERAL at 12:59

## 2020-03-05 RX ADMIN — OMEGA-3-ACID ETHYL ESTERS 1000 MG: 900 CAPSULE, LIQUID FILLED ORAL at 10:20

## 2020-03-05 RX ADMIN — INSULIN LISPRO 50 UNITS: 100 INJECTION, SUSPENSION SUBCUTANEOUS at 10:16

## 2020-03-05 RX ADMIN — CYANOCOBALAMIN TAB 500 MCG 1000 MCG: 500 TAB at 10:14

## 2020-03-05 RX ADMIN — LISINOPRIL 40 MG: 20 TABLET ORAL at 10:14

## 2020-03-05 RX ADMIN — MAGNESIUM GLUCONATE 500 MG ORAL TABLET 400 MG: 500 TABLET ORAL at 10:28

## 2020-03-05 RX ADMIN — ASPIRIN 81 MG 81 MG: 81 TABLET ORAL at 10:14

## 2020-03-05 RX ADMIN — LEVOTHYROXINE SODIUM 112 MCG: 112 TABLET ORAL at 06:20

## 2020-03-05 RX ADMIN — NYSTATIN: 100000 CREAM TOPICAL at 10:28

## 2020-03-05 RX ADMIN — INSULIN LISPRO 1 UNITS: 100 INJECTION, SOLUTION INTRAVENOUS; SUBCUTANEOUS at 12:59

## 2020-03-05 RX ADMIN — Medication 10 ML: at 10:15

## 2020-03-05 RX ADMIN — AMLODIPINE BESYLATE 2.5 MG: 2.5 TABLET ORAL at 10:15

## 2020-03-05 ASSESSMENT — PAIN SCALES - GENERAL
PAINLEVEL_OUTOF10: 0

## 2020-03-05 NOTE — PROGRESS NOTES
Pharmacy to Dose Warfarin    Goal INR range 2-3  Home Warfarin dose:alternates 5 mg and 7.5 mg daily    Date  INR  Warfarin  3/4                  1.63                  7.5 mg  3/5                  1.76                  7.5 mg    Recommend Warfarin 7.5 mg tonight x1. Daily INR ordered. Rx will continue to manage therapy per consult order.     Shawn Morales PharmD 03/04/20 1:52 PM

## 2020-03-05 NOTE — PROGRESS NOTES
Physical Therapy    Facility/Department: [facility] A2 CARD TELEMETRY  Initial Assessment    NAME: Kei Dave  : 1940  MRN: [id number]    Date of Service: 3/5/2020    Discharge Recommendations:  24 hour supervision or assist, Home with Home health PT   PT Equipment Recommendations  Equipment Needed: No    Assessment   Body structures, Functions, Activity limitations: Decreased functional mobility ; Decreased strength;Decreased balance;Decreased posture;Decreased ROM; Decreased endurance  Assessment: Pt referred for PT evaluation during current hospital stay with dx of chest pain. Pt currently functioning just slightly below her baseline, requiring Sabiha from  for bed mobility and CGA x 1 from therapist for safety & stability when walking. Pt appears to be functioning with adequate safety and (I) to return home with . Recommend 24-hr sup/assist from  and home PT at D/C. Treatment Diagnosis: Decreased L-sided strength and (I) with mobility  Specific instructions for Next Treatment: Progress ther ex and mobility as tolerated  Prognosis: Good  Decision Making: Medium Complexity  PT Education: Goals;PT Role;Plan of Care;Gait Training;Transfer Training;Functional Mobility Training;General Safety; Disease Specific Education;Precautions; Equipment; Family Education; Energy Conservation  Patient Education: Pt and pt's spouse verbalize understanding. REQUIRES PT FOLLOW UP: Yes  Activity Tolerance: Patient Tolerated treatment well       Patient Diagnosis(es): The primary encounter diagnosis was Unstable angina ([facility] A diagnosis of Chest pain, unspecified type was also pertinent to this visit. has a past medical history of Atrial fibrillation ([facility] [address] [facility] Cancer [facility]), Cerebral artery occlusion with cerebral infarction ([facility] [address] Diabetes mellitus ([facility] [address] [facility] Fractures, Hypertension, and Thyroid disease. has a past surgical history that includes Thyroidectomy; Hysterectomy;  Cholecystectomy; cyst removal; Hand Toileting: Independent  Homemaking Assistance: Needs assistance( performs majority of homemaking although pt can perform light homemaking tasks as able (i.e. washing dishes, fixing a simple meal))  Ambulation Assistance: Independent(using QC or RW for short distances (~20-25 feet max), uses scooter for long distances)  Transfer Assistance: Independent  Active : No  Patient's  Info:   Mode of Transportation: Family  Occupation: Retired  Leisure & Hobbies: Vegetable gardening    Objective  Observation/Palpation  Posture: Fair  Observation: Pt with kyphotic posture and rounded shoulders. Holds LUE at side with flexion of hand/fingers (positional deficit from old CVA). RLE AROM: WFL  LLE General AROM: Decreased ~50% throughout (baseline deficits from old CVA)  Strength RLE: WFL  Strength LLE: Grossly 2-/5 to 3-/5 throughout     Bed mobility  Supine to Sit: Minimal assistance(from , moving toward R side)  Scooting: Contact guard assistance(to scoot forward to EOB)     Transfers  Sit to Stand: Contact guard assistance  Stand to sit: Contact guard assistance  Bed to Chair: Contact guard assistance(using RW)     Ambulation  Surface: level tile  Device: Rolling Walker  Assistance: Contact guard assistance  Quality of Gait: Pt amb with slow thao with decreased stride length and decreased stance time on LLE. Mild difficulty with swing-through on LLE due to baseline weakness, but pt appears fairly steady and maintains an even pace while walking despite weakness. Mildly unsteady but no LOB. Gait Deviations: Slow Thao;Decreased step length  Distance: x 20 feet  Comments: Amb distance limited by weakness.     Balance  Posture: Fair  Sitting - Static: Good;-  Sitting - Dynamic: Fair  Standing - Static: Fair;-  Standing - Dynamic: Fair;-(using RW)    Plan   Times per week: 3-5x/week in acute care  Times per day: Daily  Specific instructions for Next Treatment: Progress ther ex and

## 2020-03-05 NOTE — DISCHARGE INSTR - COC
Continuity of Care Form    Patient Name: Dino Hernadez   :  1940  MRN:  [id number]    Admit date:  3/4/2020  Discharge date:  ***    Code Status Order: Full Code   Advance Directives:   Advance Care Flowsheet Documentation     Date/Time Healthcare Directive Type of Healthcare Directive Copy in [address] Agent's Name Healthcare Agent's Phone Number    [phone]  Yes, patient has an advance directive for healthcare treatment  --  --  --  --  --          Admitting Physician:  López Castellano DO  PCP: ANANT Mcdonald - CNP    Discharging Nurse: [facility] Unit/Room#: 0206/0206-01  Discharging Unit Phone Number: ***    Emergency Contact:   Extended Emergency Contact Information  Primary Emergency Contact: Roger Garcia  Address: [address] [place] [address], [address] Phone: [phone]  Mobile Phone: [phone]  Relation: Spouse  Secondary Emergency Contact: Syed Aguila [address] Phone: [phone]  Mobile Phone: [phone]  Relation: Child    Past Surgical History:  Past Surgical History:   Procedure Laterality Date    ANKLE FRACTURE SURGERY  2019    left ankle    ANKLE SURGERY Left 2014    Removal painful hardware    CHOLECYSTECTOMY      CYST REMOVAL      chest x2, back x1    HAND SURGERY      right    HAND SURGERY  12    DUPUYTRENS RELEASE LEFT MIDDLE AND RING FINGERS INCLUDING    HYSTERECTOMY      THYROIDECTOMY         Immunization History:   Immunization History   Administered Date(s) Administered    Influenza, High Dose (Fluzone 65 yrs and older) 2015    Pneumococcal Conjugate 13-valent (Sherl Needs) 2016       Active Problems:  Patient Active Problem List   Diagnosis Code    Atypical chest pain R07.89    DM hyperosmolarity type II, uncontrolled ([facility] [address] E11.00, E11.65    Hypothyroidism E03.9    HTN (hypertension) I10    Painful orthopaedic DME Belongings:[id number]}    RN SIGNATURE:  {Esignature:[id number]}    CASE MANAGEMENT/SOCIAL WORK SECTION    Inpatient Status Date: ***    Readmission Risk Assessment Score:  Readmission Risk              Risk of Unplanned Readmission:        13         Discharging to Facility/ Agency   · Name:  [facility]    · Address: [address] [place] [address], [address] [place] [address]., [place] [address]  · Phone: [phone]  · Fax: [phone]    Dialysis Facility (if applicable)   · Name:  · Address:  · Dialysis Schedule:  · Phone:  · Fax:    / signature: {Esignature:219891101}    PHYSICIAN SECTION    Prognosis: Good    Condition at Discharge: Stable    Rehab Potential (if transferring to Rehab): {Prognosis:9972519104}    Recommended Labs or Other Treatments After Discharge:   Home PT     Physician Certification: I certify the above information and transfer of Arleen Luna  is necessary for the continuing treatment of the diagnosis listed and that she requires Home Care for less 30 days.      Update Admission H&P: No change in H&P  Recommended Follow-up, Labs or Other Treatments After Discharge:    ***           PHYSICIAN SIGNATURE:  Electronically signed by Liana Wagner DO on 3/5/20 at 2:09 PM

## 2020-03-05 NOTE — DISCHARGE SUMMARY
Studies    Radiology:   XR CHEST PORTABLE   Final Result   No evidence of acute process. Consults:     IP CONSULT TO HOSPITALIST  IP CONSULT TO PHARMACY  IP CONSULT TO CARDIOLOGY  IP CONSULT TO HOME CARE NEEDS    Disposition:  Home     Condition at Discharge: Stable    Discharge Instructions/Follow-up:    - Please follow-up with your primary care provider in next 1 week    Code Status:  Full Code     Activity: activity as tolerated    Diet: regular diet      Discharge Medications:     Current Discharge Medication List           Details   amLODIPine (NORVASC) 2.5 MG tablet Take 1 tablet by mouth daily  Qty: 30 tablet, Refills: 0              Details   !! warfarin (COUMADIN) 7.5 MG tablet Take 7.5 mg by mouth Tues-Thur-Sat      insulin regular (HUMULIN R;NOVOLIN R) 100 UNIT/ML injection Inject 44-46 Units into the skin Daily with supper      insulin 70-30 (HUMULIN 70/30) (70-30) 100 UNIT per ML injection vial Inject 44-46 Units into the skin 2 times daily Morning and bedtime      levothyroxine (SYNTHROID) 112 MCG tablet Take 112 mcg by mouth Daily       rosuvastatin (CRESTOR) 10 MG tablet Take 1 tablet by mouth daily  Qty: 90 tablet, Refills: 3      lisinopril (PRINIVIL;ZESTRIL) 40 MG tablet Take 40 mg by mouth daily      magnesium (MAGNESIUM-OXIDE) 250 MG TABS tablet Take 250 mg by mouth daily 3x/week      Omega-3 Fatty Acids (FISH OIL) 1200 MG CAPS Take 1 capsule by mouth 2 times daily       vitamin B-12 (CYANOCOBALAMIN) 1000 MCG tablet Take 1,000 mcg by mouth daily 3x/week      !! warfarin (COUMADIN) 5 MG tablet Take 5 mg by mouth daily M-W-F-Sunday      nystatin (MYCOSTATIN) 971638 UNIT/GM cream        !! - Potential duplicate medications found. Please discuss with provider. Time Spent on discharge is more than 45 minutes in the examination, evaluation, counseling and review of medications and discharge plan.     This patient was seen and evaluated with attending,

## 2020-03-05 NOTE — PLAN OF CARE
Diabetes education provided today:    Different diabetic medications. Managing high and low sugar readings. Rotation of sites for subcutaneous medication injection.       Problem: Metabolic:  Goal: Ability to maintain clinical measurements within normal limits will improve  Description  Ability to maintain clinical measurements within normal limits will improve  Outcome: Ongoing

## 2020-03-05 NOTE — PROGRESS NOTES
Inpatient Family Medicine   Progress Note      PCP: ANANT Rodriguez - CNP    Date of Admission: 3/4/2020    Chief Complaint: Chest pressure    Hospital Course:   Kei Dave is a 79 y.o. female who presented to [facility] with chest pressure. Denies pain but feels heavy substernal pressure \"like an 100-lb person laying on my chest\". Patient was resting in bed at onset of symptoms. Similar to previous episodes, which occur every 2-3 weeks. This episode started last night and persisted all night. Pressure would radiate over to left chest wall. Improved with getting up and ambulating but worse when laying back down. Denies any fevers, chills, nausea, vomiting, no GI symptoms. Has some chronic abdominal pain. Has some left leg swelling s/p CVA. Denies history of cardiac stent. Followed by Dr. Yasmine Lee in cardiology. Reported that she should present to the ED if she experiences any chest pain. Previously scheduled to see Dr. Yasmine Lee tomorrow. Last visit September 2019.     In the ED, EKG with RBBB but no ST changes. Troponin negative x1. Given aspirin and nitroglycerin. Symptoms improved with nitroglycerin. Pain decreased to a 3-4/10. Patient was admitted for further evaluation and management of chest pain. During this admission, cardiology felt that symptoms were not of a cardiac etiology and thus patient did not require any testing. Symptoms resolved a few hours after admission. Discussed possibility of reflux contributing to symptoms. Patient reports that she is already on an antacid. Prior to discharge, patient was noted to have elevated blood pressures with systolic in 764Z which improved with PO hydralazine. Additionally, patient slipped onto her buttocks while attempting to use beside commode. No head trauma but required assistance from nursing staff. Subjective: Pt resting comfortably in bed with  at bedside.  Pt denied any chest pain or substernal pressure this AM. Reported having some

## 2020-03-05 NOTE — CARE COORDINATION
Critical access hospital    DC order noted, all docs needed have been faxed to [facility] for home care services. Home Care to see patient within 24-48 hrs.     Hawk Enriquez RN, BSN CTN  [facility] [phone]

## 2020-03-10 ENCOUNTER — OFFICE VISIT (OUTPATIENT)
Dept: CARDIOLOGY CLINIC | Age: 80
End: 2020-03-10
Payer: MEDICARE

## 2020-03-10 VITALS
DIASTOLIC BLOOD PRESSURE: 70 MMHG | BODY MASS INDEX: 33.46 KG/M2 | SYSTOLIC BLOOD PRESSURE: 136 MMHG | HEART RATE: 70 BPM | WEIGHT: 196 LBS | HEIGHT: 64 IN

## 2020-03-10 PROBLEM — I45.10 RBBB: Status: ACTIVE | Noted: 2020-03-10

## 2020-03-10 PROCEDURE — 99214 OFFICE O/P EST MOD 30 MIN: CPT | Performed by: NURSE PRACTITIONER

## 2020-03-10 PROCEDURE — 93000 ELECTROCARDIOGRAM COMPLETE: CPT | Performed by: NURSE PRACTITIONER

## 2020-03-10 RX ORDER — AMLODIPINE BESYLATE 2.5 MG/1
2.5 TABLET ORAL DAILY
Qty: 30 TABLET | Refills: 11 | Status: ON HOLD | OUTPATIENT
Start: 2020-03-10 | End: 2020-10-27 | Stop reason: SDUPTHER

## 2020-03-10 NOTE — PROGRESS NOTES
[facility], [place], operated by [facility]   Electrophysiology Outpatient Note              Date:  March 10, 2020  Patient name: Fay Calderon  YOB: 1940    Primary Care physician: ANANT Delatorre CNP    HISTORY OF PRESENT ILLNESS: The patient is an 79 y.o.  female with a history of PAF, RBBB, HTN, nonobstructive CAD, chronic chest pain, childhood rheumatic fever, dilated aortic arch, recurrent CVA, acquired hypothyroidism. LHC in 12/2012 showed nonobstructive CAD. In 11/2015 she had a loop recorder implanted due to CVA and notes indicated atrial arrhythmias were detected. Loop recorder was explanted in 5/2017. Echo in 8/2019 showed an EF of 55%. Stress test in 8/2019 was negative. Due to chest pain and palpitations, a loop recorder was recommended in 9/2019 but she declined. She was admitted in 3/2020 with chest pain and workup was negative except BP was elevated. Today she is being seen for PAF. EKG shows SR with RBBB with a HR of 72. Patient complains of chronic right sided weakness. Also has some dizziness if standing too fast. Currently denies chest pain and palpitations. Past Medical History:   has a past medical history of Atrial fibrillation ([facility] [address] Cancer [facility]), Cerebral artery occlusion with cerebral infarction ([facility] [address] Diabetes mellitus ([facility] [address] Fractures, Hypertension, and Thyroid disease. Past Surgical History:   has a past surgical history that includes Thyroidectomy; Hysterectomy; Cholecystectomy; cyst removal; Hand surgery; Ankle fracture surgery (2019); Hand surgery (2/21/12); and Ankle surgery (Left, 6/17/2014). Home Medications:    Prior to Admission medications    Medication Sig Start Date End Date Taking?  Authorizing Provider   warfarin (COUMADIN) 7.5 MG tablet Take 7.5 mg by mouth Tues-Thur-Sat   Yes Historical Provider, MD   insulin regular (HUMULIN R;NOVOLIN R) 100 UNIT/ML injection Inject 44-46 Units into the skin Daily with supper   Yes Historical Provider, MD appears stated age  [de-identified]: PERRL, no cervical lymphadenopathy. No masses palpable. Normal oral mucosa  Respiratory:  · Normal excursion and expansion without use of accessory muscles  · Resp auscultation: Normal breath sounds without wheezing, rhonchi, and rales  Cardiovascular:  · The apical impulse is not displaced  · Heart tones are crisp and normal. regular S1 and S2.  · Jugular venous pulsation Normal  · The carotid upstroke is normal in amplitude and contour without delay or bruit  · Peripheral pulses are symmetrical and full   Abdomen:  · No masses or tenderness  · Bowel sounds present  Extremities:  ·  No cyanosis or clubbing  ·  No lower extremity edema  ·  Skin: warm and dry  Neurological:  · Alert and oriented  · + right sided weakness  · + slowed speech    DATA:    ECG 3/10/2019:  SR with RBBB HR 72    Echo 8/10/2019:  Left ventricular systolic function is normal with a visually estimated ejection fraction of 55%. Mild concentric left ventricular hypertrophy. No regional wall motion abnormalities are noted. Diastolic filling parameters suggests impaired left ventricular relaxation. The aortic arch is mildly dilated at 3.9 cm. Consider other imaging modalities (e.g. CT) to assess for evaluation of aorta. Mild mitral regurgitation. Stress test 8/10/2019: There is normal isotope uptake at stress and rest. There is no evidence of    myocardial ischemia or scar.    Normal LV function.    Overall findings represent a low risk scan.         [facility] 12/2012 (Lis):  LM <20%  LAD 50% . FFR 0.91  LCx 20-30%  RCA < 20%  LVEF 60%     Add statin  D/C later today    All labs and testing reviewed. CARDIOLOGY LABS:   CBC: No results for input(s): WBC, HGB, HCT, PLT in the last 72 hours. BMP: No results for input(s): NA, K, CO2, BUN, CREATININE, LABGLOM, GLUCOSE in the last 72 hours. PT/INR: No results for input(s): PROTIME, INR in the last 72 hours. APTT:No results for input(s):  APTT in the last 72 hours.  FASTING LIPID PANEL:  Lab Results   Component Value Date    HDL 47 07/23/2018    LDLDIRECT 125 07/02/2015    LDLCALC 60 07/23/2018    TRIG 125 07/23/2018     LIVER PROFILE:No results for input(s): AST, ALT, ALB in the last 72 hours. Assessment:   Paroxysmal atrial fibrillation: stable   -LBH1BU1okcg score 7 (age, gender, HTN, CVA, DM)  RBBB: chronic, stable   HTN: controlled   Nonobstructive CAD: stable   -s/p [facility] 2012   -stress test negative 2019  Chronic atypical chest pain  Dilated aortic arch  Childhood rheumatic fever  Recurrent CVA  Acquired hypothyroidism: s/p thyroidectomy     Plan:   1. Continue lisinopril, magnesium, amlodipine, and Coumadin (PCP managing)  2. Patient has declined reimplantation of loop recorder for arrhythmia monitoring/relationship to chest pain  3. Take amlodipine as prescribed (resent in today)  4. Monitor BP at home and call if consistently out of goal ranges   5.  Follow up in 6 months      ANANT Liu-CNP  [facility]  [phone]

## 2020-03-23 ENCOUNTER — TELEPHONE (OUTPATIENT)
Dept: ENDOCRINOLOGY | Age: 80
End: 2020-03-23

## 2020-03-23 NOTE — TELEPHONE ENCOUNTER
Patient needs a prescription for 6mm 30g insulin syringes 100 ct.  . 3 month supply    Please send to  [facility]  [phone]

## 2020-03-24 RX ORDER — NAPROXEN SODIUM 220 MG
1 TABLET ORAL 3 TIMES DAILY
Qty: 270 EACH | Refills: 0 | Status: SHIPPED | OUTPATIENT
Start: 2020-03-24 | End: 2020-05-19 | Stop reason: SDUPTHER

## 2020-05-05 ENCOUNTER — VIRTUAL VISIT (OUTPATIENT)
Dept: ENDOCRINOLOGY | Age: 80
End: 2020-05-05
Payer: MEDICARE

## 2020-05-05 PROCEDURE — 99442 PR PHYS/QHP TELEPHONE EVALUATION 11-20 MIN: CPT | Performed by: NURSE PRACTITIONER

## 2020-05-05 ASSESSMENT — ENCOUNTER SYMPTOMS
CONSTIPATION: 0
COLOR CHANGE: 0
EYE PAIN: 0
DIARRHEA: 0
SHORTNESS OF BREATH: 0
NAUSEA: 0

## 2020-05-05 NOTE — PROGRESS NOTES
Endocrinology  Michelle Blood, [place], [address] [facility] [address] [facility] [address]  Phone [phone]  Fax [phone]    Sophie Penaloza is  being evaluated by a Virtual Visit (phone visit) encounter to address concerns as mentioned above. Due to this being a TeleHealth encounter (During [place]- public health emergency), evaluation of the following organ systems was limited: Vitals/Constitutional/EENT/Resp/CV/GI//MS/Neuro/Skin/Heme-Lymph-Imm. Pursuant to the emergency declaration under the [address] [place] [address], [address] [place] [address] authority and the [employer] and [employer] Act, this Virtual Visit was conducted with patient's (and/or legal guardian's) consent, to reduce the patient's risk of exposure to COVID-19 and provide necessary medical care. The patient (and/or legal guardian) has also been advised to contact this office for worsening conditions or problems, and seek emergency medical treatment and/or call 911 if deemed necessary. Services were provided through a telephone synchronous discussion virtually to substitute for in-person clinic visit. Patient and provider were located at their individual homes    Patient was identified via name,  on the video visit    Sophie Penaloza is a 79 y.o. female who is a new patient presenting for management of Diabetes Mellitus Type 2.     Last A1C:   Lab Results   Component Value Date    LABA1C 9.0 2019    LABA1C 8.7 08/10/2019    LABA1C 9.0 2019     Last BP Readings:  BP Readings from Last 3 Encounters:   03/10/20 136/70   20 (!) 158/78   20 124/82     Last LDL:   Lab Results   Component Value Date    LDLCALC 60 2018    LDLDIRECT 125 (H) 2015     Aspirin Use: no    Tobacco/Alcohol History:    Smoking status: Never Smoker    Smokeless tobacco: Never Used    Alcohol use: No     Diabetes:  Evelyn Vail  was diagnosed with diabetes type 2 in : > 30 years   On insulin: > 25 years   Patient reports that diabetes is generally not well controlled.  Disease course has been variable    Current microvascular complications include none.  Current Macrovascular complications include Stroke, CVD, CAD.  Patient reports compliance for about 80% of the time and adheres to medication, but usually not to diet and exercise instructions.  There have been no recent hospital or ED admissions for hypoglycemia, hyperglycemia or DKA. [facility] Other ED visit include for : chest pain    PMH includes  Past Medical History:   Diagnosis Date    Atrial fibrillation ([address]     Cancer ([address]     Cerebral artery occlusion with cerebral infarction ([address]     left sided weakness-left arm and left leg, also left eye    Diabetes mellitus ([address]     Fractures     Hypertension     Thyroid disease      Blood glucose trends:  Patient brought log glucometer for download: no  Checks blood sugar 4 times --> 3 times  BG Range: high 200 -  low 300s   Hypoglycemia awareness and symptoms: 69 , hypoglcyemia unawareness at times==> 50  Has not done CGM    Current Medication regimen:   Insulin 70/30 : 30-40 BID--> 44/46 --> 40/ 42 (  titrates dose based on BG, does not follow sliding scale)  Insulin R: 13-16 units PRN at lunch     Previous  Levemir 36 units BID  Other meds tried in past: lantus, humalog, insulin R, NPH  GFR > 60    Current Dietary regimen:   Has recently improved her diet  BF: sausage and egg,banana coffee  Lunch: cabbage/cauliflower.  Protein portions-> lunchmeat sw,   Supper: stew, protein,   Snacks: none  Beverages: coffee, diet soda, water  Average carbs per day    Microvascular Complications:  · Neuropathy: denies concerns, post stroke for 4years, left sided paraplegia  · Retinopathy: blurry vision, denies any diabetic eye changes  · Nephropathy: no recent MACR    Diabetic Health Maintenance   · Last Eye Exam: > 1 year  · Last Foot exam: none recently  · Has Diabetes Mother     Cancer Brother     High Blood Pressure Brother     Depression Maternal Aunt     Heart Disease Brother     Diabetes Brother     High Blood Pressure Brother     Heart Disease Brother     Diabetes Brother     High Blood Pressure Brother      Review of Systems   Constitutional: Negative for activity change, appetite change, diaphoresis, fever and unexpected weight change. HENT: Negative for dental problem. Eyes: Negative for pain and visual disturbance. Respiratory: Negative for shortness of breath. Cardiovascular: Negative for chest pain, palpitations and leg swelling. Gastrointestinal: Negative for constipation, diarrhea and nausea. Endocrine: Negative for cold intolerance, heat intolerance, polydipsia, polyphagia and polyuria. Genitourinary: Negative for frequency and urgency. Musculoskeletal: Negative for arthralgias, joint swelling and myalgias. Skin: Negative for color change and pallor. Neurological: Negative for weakness, numbness and headaches. Psychiatric/Behavioral: Negative for dysphoric mood and sleep disturbance. The patient is not nervous/anxious. There were no vitals filed for this visit.  televisit    Physical Exam  televisit    Skeletal foot exam: deferred    Assessment  Kaylie Guillen is a 79 y.o. female with uncontrolled Diabetes Mellitus type 2 complicated by peripheral neuropathy and associated with HTN, HLD, Vitamin D deficiency, old CVA with hemiparesis     Plan  Problem List Items Addressed This Visit     DM hyperosmolarity type II, uncontrolled ([facility] [address]     Lab Results   Component Value Date    LABA1C 9.0 12/03/2019     Goal A1c  < 7%  Avoid hypoglycemia    Novolin 70/30 : 35 units BID and titrate 2: 50 > 200    Diet :   30-40 grams per meal , 3 meals per day, avoid carbs in snack choices  Include moderate proteins and good fats   Ensure adequate hydration and  electrolyte replacement    Exercise :  Recommended exercise is 5-7 days a week for

## 2020-05-06 RX ORDER — FLASH GLUCOSE SENSOR
2 KIT MISCELLANEOUS
Qty: 6 EACH | Refills: 2 | Status: SHIPPED | OUTPATIENT
Start: 2020-05-06 | End: 2020-05-06

## 2020-05-06 RX ORDER — FLASH GLUCOSE SENSOR
2 KIT MISCELLANEOUS
Qty: 6 EACH | Refills: 2 | Status: SHIPPED | OUTPATIENT
Start: 2020-05-06 | End: 2020-06-26 | Stop reason: SDUPTHER

## 2020-05-19 RX ORDER — NAPROXEN SODIUM 220 MG
1 TABLET ORAL 3 TIMES DAILY
Qty: 270 EACH | Refills: 0 | Status: SHIPPED | OUTPATIENT
Start: 2020-05-19

## 2020-05-20 ENCOUNTER — HOSPITAL ENCOUNTER (OUTPATIENT)
Age: 80
Discharge: HOME | End: 2020-05-20
Payer: MEDICARE

## 2020-05-20 LAB
AMYLASE: 43 U/L (ref 25–115)
INR BLD: 1.43 (ref 0.86–1.14)
LIPASE: 26 U/L (ref 13–60)
PROTHROMBIN TIME: 16.6 SEC (ref 10–13.2)
T4 FREE: 1.5 NG/DL (ref 0.9–1.8)
TSH SERPL DL<=0.05 MIU/L-ACNC: 2.26 UIU/ML (ref 0.27–4.2)

## 2020-05-20 PROCEDURE — 85610 PROTHROMBIN TIME: CPT

## 2020-05-20 PROCEDURE — 84439 ASSAY OF FREE THYROXINE: CPT

## 2020-05-20 PROCEDURE — 36415 COLL VENOUS BLD VENIPUNCTURE: CPT

## 2020-05-20 PROCEDURE — 82150 ASSAY OF AMYLASE: CPT

## 2020-05-20 PROCEDURE — 83690 ASSAY OF LIPASE: CPT

## 2020-05-20 PROCEDURE — 83036 HEMOGLOBIN GLYCOSYLATED A1C: CPT

## 2020-05-20 PROCEDURE — 84443 ASSAY THYROID STIM HORMONE: CPT

## 2020-05-21 LAB
ESTIMATED AVERAGE GLUCOSE: 197.3 MG/DL
HBA1C MFR BLD: 8.5 %

## 2020-06-26 ENCOUNTER — TELEPHONE (OUTPATIENT)
Dept: ENDOCRINOLOGY | Age: 80
End: 2020-06-26

## 2020-06-26 RX ORDER — FLASH GLUCOSE SENSOR
2 KIT MISCELLANEOUS
Qty: 6 EACH | Refills: 5 | Status: SHIPPED | OUTPATIENT
Start: 2020-06-26 | End: 2020-11-09 | Stop reason: SDUPTHER

## 2020-06-29 NOTE — TELEPHONE ENCOUNTER
Spoke to pt and he is having to give her 42-45 units 3 times a day to keep her lower.  He said the insuline isnt working as good as it used to would like to know if there is something to better keep controled

## 2020-06-30 NOTE — TELEPHONE ENCOUNTER
Called and spoke with her , who is primary care giver.   Describes poor intake, poor hydration and no electrolyte replacement drinks as discussed at previous visits  Reviewed her SSI, recommended BID dosing is preferable  Ensure 8-10 glasses of water and 1 bottle 12 oz of electrolyte drink ( zero sugar)  Reports ongoing stressors: brother in hospital with chest infection, adult children at home, not working  Declines rx for long acting insulin, cannot afford it

## 2020-08-04 ENCOUNTER — VIRTUAL VISIT (OUTPATIENT)
Dept: ENDOCRINOLOGY | Age: 80
End: 2020-08-04
Payer: MEDICARE

## 2020-08-04 PROCEDURE — 99443 PR PHYS/QHP TELEPHONE EVALUATION 21-30 MIN: CPT | Performed by: NURSE PRACTITIONER

## 2020-08-04 ASSESSMENT — ENCOUNTER SYMPTOMS
COLOR CHANGE: 0
SHORTNESS OF BREATH: 0
CONSTIPATION: 0
DIARRHEA: 0
NAUSEA: 0
EYE PAIN: 0

## 2020-08-04 NOTE — ASSESSMENT & PLAN NOTE
Lab Results   Component Value Date    LABA1C 8.5 05/20/2020     Goal A1c  7.5% - 8%  Continue to titrate  Novolin  70/30: 42 units BID with 5: 50 > 200  Insulin R @ 45 units  Avoid hypoglycemia    Diet :   30-40 grams per meal , 3 meals per day, avoid carbs in snack choices  Include moderate proteins and good fats   Ensure adequate hydration and  electrolyte replacement    Exercise :  As possible, currently wheelchair bound. Diabetic Health Maintenance  Follow up with annual eye exams  Follow up with annual podiatry exams if needed  Reviewed sick day management of BG     Other areas of Diabetic Education reviewed:   Carbs: good carbs and bad carbs, importance of carb counting, incorporation of protein with each meal to reduce Glycemic index, importance of portions, Carb/insulin ratio   Fats: Good fats and bad fats, meal planning and supplements.  Discussed how food affects blood sugar readings.     Different diabetic medications   Managing high and low sugar readings   Rotation of sites for subcutaneous medication injection

## 2020-08-04 NOTE — PROGRESS NOTES
Endocrinology  Kevin Patricio, GAYATHRI, [address] [facility] [address] [facility], [address]  Phone [phone]  Fax [phone]    Tiana Kruger is  being evaluated by a Virtual Visit (phone visit) encounter to address concerns as mentioned above. Due to this being a TeleHealth encounter (During - public health emergency), evaluation of the following organ systems was limited: Vitals/Constitutional/EENT/Resp/CV/GI//MS/Neuro/Skin/Heme-Lymph-Imm. Pursuant to the emergency declaration under the [address] [place] [address], [address] [place] [address] authority and the [employer] Act, this Virtual Visit was conducted with patient's (and/or legal guardian's) consent, to reduce the patient's risk of exposure to COVID-19 and provide necessary medical care. The patient (and/or legal guardian) has also been advised to contact this office for worsening conditions or problems, and seek emergency medical treatment and/or call 911 if deemed necessary. Services were provided through a telephone synchronous discussion virtually to substitute for in-person clinic visit. Patient and provider were located at their individual homes    Patient was identified via name,  on the phone visit    Tiana Kruger is a 79 y.o. female who is a new patient presenting for management of Diabetes Mellitus Type 2.     Last A1C:   Lab Results   Component Value Date    LABA1C 8.5 2020    LABA1C 9.0 2019    LABA1C 8.7 08/10/2019     Last BP Readings:  BP Readings from Last 3 Encounters:   03/10/20 136/70   20 (!) 158/78   20 124/82     Last LDL:   Lab Results   Component Value Date    LDLCALC 60 2018    LDLDIRECT 125 (H) 2015     Aspirin Use: no    Tobacco/Alcohol History:    Smoking status: Never Smoker    Smokeless tobacco: Never Used    Alcohol use: No     Diabetes:  Kelley Wang  was diagnosed with diabetes type 2 in : > 30 years   On insulin: > 25 years   Patient reports that diabetes is generally not well controlled.  Disease course has been variable    Current microvascular complications include none.  Current Macrovascular complications include Stroke, CVD, CAD.  Patient reports compliance for about 80% of the time and adheres to medication, but usually not to diet and exercise instructions.  There have been no recent hospital or ED admissions for hypoglycemia, hyperglycemia or DKA.  Other ED visit include for : chest pain    HPI on 8/4/2020  Recent UTI and managed by PCP ; currently on Nitrofurantoin  Reviewed kimi readings: 192 for 80 days   is primary care giver, adjusts insulin doses as needed    PMH includes  Past Medical History:   Diagnosis Date    [facility] fibrillation [facility]     Cancer ([facility]     Cerebral artery occlusion with cerebral infarction ([facility]     left sided weakness-left arm and left leg, also left eye    Diabetes mellitus ([address]     Fractures     Hypertension     Thyroid disease      Blood glucose trends:  Patient brought log glucometer for download: no  Checks blood sugar 4 times --> 3 times  BG Range: high 200 -  low 300s   Hypoglycemia awareness and symptoms: 69 , hypoglcyemia unawareness at times==> 50  Now uses a kimi sensor  90 days: 192  7 days: 216    Current Medication regimen:   Insulin 70/30 : 30-40 BID--> 44/46 --> 40/ 42 (  titrates dose based on BG, does not follow sliding scale)  Insulin R: 13-16 units PRN at lunch ---> 42 units based on titration    Previous meds  Levemir 36 units BID  Other meds tried in past: lantus, humalog, insulin R, NPH  GFR > 60    Current Dietary regimen:   Has recently improved her diet  BF: sausage and egg,banana coffee  Lunch: cabbage/cauliflower.  Protein portions-> lunchmeat sw,   Supper: stew, protein,   Snacks: none  Beverages: coffee, diet soda, water  Average carbs per day > 100    Microvascular Complications:  · Neuropathy: denies concerns, post stroke for 4years, left sided paraplegia  · Retinopathy: blurry vision, denies any diabetic eye changes  · Nephropathy: no recent MACR    Diabetic Health Maintenance   · Last Eye Exam: > 1 year  · Last Foot exam: none recently  · Has patient seen a dietitian? Yes  · Current Exercise: No structured exercise  · On ACEI or ARB: lisinopril 40 mg QD  · On statin: Crestor 10 mg QD    Hyperlipidemia: Current complaints include occasional myalgias but otherwise tolerates well. Lab Results   Component Value Date    CHOL 132 07/23/2018    CHOL 211 12/05/2016    CHOL 208 10/07/2016     Lab Results   Component Value Date    TRIG 125 07/23/2018    TRIG 208 12/05/2016    TRIG 150 10/07/2016     Lab Results   Component Value Date    HDL 47 07/23/2018    HDL 43 12/05/2016    HDL 53 10/07/2016     Lab Results   Component Value Date    LDLCALC 60 07/23/2018    LDLCALC 126 12/05/2016    [address] 125 10/07/2016     Lab Results   Component Value Date    LDLDIRECT 125 07/02/2015     No results found for: CHOLHDLRATIO    Vitamin D deficiency: Currently is on no rx supplementation. Current complaints include fatigue on daily basis. Last vitamin Dlevel is:  Lab Results   Component Value Date    VITD25 30.8 10/01/2019    VITD25 25.3 11/20/2015    VITD25 27.7 02/13/2015       Hypertension  Elevated at visit. She denies symptoms of dizziness, light headedness. Occasional dependent edema. Tries to follow a salt restricted diet.    Lab Results   Component Value Date     03/04/2020    K 5.0 03/04/2020     03/04/2020    CO2 27 03/04/2020    BUN 22 (H) 03/04/2020    CREATININE 0.7 03/04/2020    GLUCOSE 212 (H) 03/04/2020    CALCIUM 9.0 03/04/2020    PROT 7.3 03/04/2020    LABALBU 4.1 03/04/2020    BILITOT 0.5 03/04/2020    ALKPHOS 62 03/04/2020    AST 25 03/04/2020    ALT 24 03/04/2020    LABGLOM >60 03/04/2020    GFRAA >60 03/04/2020    AGRATIO 1.3 03/04/2020    GLOB 3.2 03/04/2020 The ASCVD Risk score (Trice Minor et al., 2013) failed to calculate for the following reasons: The 2013 ASCVD risk score is only valid for ages 36 to 78    The patient has a prior MI or stroke diagnosis    Family History   Problem Relation Age of Onset    Cancer Mother     Diabetes Mother     Cancer Brother     High Blood Pressure Brother     Depression Maternal Aunt     Heart Disease Brother     Diabetes Brother     High Blood Pressure Brother     Heart Disease Brother     Diabetes Brother     High Blood Pressure Brother      Review of Systems   Constitutional: Negative for activity change, appetite change, diaphoresis, fever and unexpected weight change. HENT: Negative for dental problem. Eyes: Negative for pain and visual disturbance. Respiratory: Negative for shortness of breath. Cardiovascular: Negative for chest pain, palpitations and leg swelling. Gastrointestinal: Negative for constipation, diarrhea and nausea. Endocrine: Negative for cold intolerance, heat intolerance, polydipsia, polyphagia and polyuria. Genitourinary: Negative for frequency and urgency. Musculoskeletal: Negative for arthralgias, joint swelling and myalgias. Skin: Negative for color change and pallor. Neurological: Negative for weakness, numbness and headaches. Psychiatric/Behavioral: Negative for dysphoric mood and sleep disturbance. The patient is not nervous/anxious. There were no vitals filed for this visit.  televisit    Physical Exam  televisit    Skeletal foot exam: deferred    Assessment  Betsy Beltran is a 79 y.o. female with uncontrolled Diabetes Mellitus type 2 complicated by peripheral neuropathy and associated with HTN, HLD, Vitamin D deficiency, old CVA with hemiparesis     Plan  Problem List Items Addressed This Visit     DM hyperosmolarity type II, uncontrolled ([facility] [address] [facility] - Primary     Lab Results   Component Value Date    LABA1C 8.5 05/20/2020     Goal A1c  7.5% - 8%  Continue to

## 2020-09-18 ENCOUNTER — TELEPHONE (OUTPATIENT)
Dept: ENDOCRINOLOGY | Age: 80
End: 2020-09-18

## 2020-09-18 NOTE — TELEPHONE ENCOUNTER
Spoke with patient's    Her BG has been in the 200-300 range   Currently takes   Humulin  70/30 42 units BID  Novolin R: 40 units QD    Switch to   Levemir 30 units BID  Novolin R 40 units QD @ lunch as before    Ensure hydration  Trial gatorade/powerade zero 12 oz qd    If sugars stay high, discussed taking her to the ED for possible underlying infection  At PCP office her UA was neg for infection  Said she had passed blood in urine ( on coumadin ) and dose was decreased

## 2020-09-25 ENCOUNTER — OFFICE VISIT (OUTPATIENT)
Dept: CARDIOLOGY CLINIC | Age: 80
End: 2020-09-25
Payer: MEDICARE

## 2020-09-25 VITALS
WEIGHT: 196.5 LBS | HEIGHT: 64 IN | DIASTOLIC BLOOD PRESSURE: 70 MMHG | BODY MASS INDEX: 33.55 KG/M2 | HEART RATE: 85 BPM | OXYGEN SATURATION: 98 % | SYSTOLIC BLOOD PRESSURE: 130 MMHG

## 2020-09-25 PROCEDURE — 99214 OFFICE O/P EST MOD 30 MIN: CPT | Performed by: NURSE PRACTITIONER

## 2020-09-25 PROCEDURE — 93000 ELECTROCARDIOGRAM COMPLETE: CPT | Performed by: NURSE PRACTITIONER

## 2020-09-25 RX ORDER — VITAMIN B COMPLEX
1 CAPSULE ORAL DAILY
COMMUNITY

## 2020-09-25 RX ORDER — NITROGLYCERIN 0.4 MG/1
0.4 TABLET SUBLINGUAL EVERY 5 MIN PRN
Qty: 25 TABLET | Refills: 0 | Status: SHIPPED | OUTPATIENT
Start: 2020-09-25

## 2020-09-25 NOTE — PATIENT INSTRUCTIONS
See cardiology for chest pain  Follow up with Dr. Shanice Wilson in 6 months   Call me if you want to switch to Eliquis 5mg twice per day or Xarelto 20mg once per day

## 2020-09-25 NOTE — PROGRESS NOTES
[facility]   Electrophysiology Outpatient Note              Date:  September 25, 2020  Patient name: Seema Gould  YOB: 1940    Primary Care physician: ANANT Potter CNP    HISTORY OF PRESENT ILLNESS: The patient is an 79 y.o.  female with a history of PAF, RBBB, HTN, nonobstructive CAD, chronic chest pain, childhood rheumatic fever, dilated aortic arch, recurrent CVA, acquired hypothyroidism. SHE IS A POOR HISTORIAN. LHC in 12/2012 showed nonobstructive CAD. In 11/2015 she had a loop recorder implanted due to CVA and notes indicated atrial arrhythmias were detected. Loop recorder was explanted in 5/2017. Echo in 8/2019 showed an EF of 55%. Stress test in 8/2019 was negative. Due to chest pain and palpitations, a loop recorder was recommended in 9/2019 but she declined. She was admitted in 3/2020 with chest pain and workup was negative except BP was elevated. Patient has remained in sinus at subsequent visits. Today she is being seen for PAF. EKG shows SR with RBBB with a HR of 81. Patient complains of recent hematuria and rectal bleeding. Is being worked up by PCP. She is requesting a prescription for NTG for ongoing chest pain (sharp, midsternal, usually occurs at 4am, relieved by NTG). Has taken her 's NTG twice since last visit. Also has SOB when flat, dizziness, and chronic left sided weakness/left leg swelling. Denies palpitations. Past Medical History:   has a past medical history of Atrial fibrillation ([facility] [address] Cancer [facility]), Cerebral artery occlusion with cerebral infarction ([facility] [address] Diabetes mellitus ([facility] [address] Fractures, Hypertension, and Thyroid disease. Past Surgical History:   has a past surgical history that includes Thyroidectomy; Hysterectomy; Cholecystectomy; cyst removal; Hand surgery; Ankle fracture surgery (2019); Hand surgery (2/21/12); and Ankle surgery (Left, 6/17/2014).      Home Medications:    Prior to Admission medications Medication Sig Start Date End Date Taking? Authorizing Provider   b complex vitamins capsule Take 1 capsule by mouth daily   Yes Historical Provider, MD   Continuous Blood Gluc Sensor (FREESTYLE ASHELY 14 DAY SENSOR) MISC 2 Units by Does not apply route every 14 days 6/26/20  Yes ANANT Pickard CNP   Insulin Syringe-Needle U-100 (INSULIN SYRINGE 1CC/30GX5/16\") 30G X 5/16\" 1 ML MISC 1 each by Does not apply route 3 times daily 5/19/20  Yes ANANT Pickard CNP   amLODIPine (NORVASC) 2.5 MG tablet Take 1 tablet by mouth daily 3/10/20  Yes ANANT Biswas CNP   warfarin (COUMADIN) 7.5 MG tablet Take 7.5 mg by mouth Tues-Thur-Sat   Yes Historical Provider, MD   insulin regular (HUMULIN R;NOVOLIN R) 100 UNIT/ML injection Inject 44-46 Units into the skin Daily with supper   Yes Historical Provider, MD   insulin 70-30 (HUMULIN 70/30) (70-30) 100 UNIT per ML injection vial Inject 44-46 Units into the skin 2 times daily Morning and bedtime   Yes Historical Provider, MD   levothyroxine (SYNTHROID) 112 MCG tablet Take 112 mcg by mouth Daily  2/26/20  Yes Historical Provider, MD   rosuvastatin (CRESTOR) 10 MG tablet Take 1 tablet by mouth daily 7/25/18  Yes ANANT Robles CNP   lisinopril (PRINIVIL;ZESTRIL) 40 MG tablet Take 40 mg by mouth daily   Yes Historical Provider, MD   magnesium (MAGNESIUM-OXIDE) 250 MG TABS tablet Take 250 mg by mouth daily 3x/week   Yes Historical Provider, MD   nystatin (MYCOSTATIN) 303765 UNIT/GM cream  2/10/20   Historical Provider, MD   Omega-3 Fatty Acids (FISH OIL) 1200 MG CAPS Take 1 capsule by mouth 2 times daily     Historical Provider, MD   vitamin B-12 (CYANOCOBALAMIN) 1000 MCG tablet Take 1,000 mcg by mouth daily 3x/week    Historical Provider, MD       Allergies:  Cephalexin; Contrast [iodides]; and Barium-containing compounds    Social History:   reports that she has never smoked.  She has never used smokeless tobacco. She reports that she does not drink alcohol or use drugs. Family History: family history includes Cancer in her brother and mother; Depression in her maternal aunt; Diabetes in her brother, brother, and mother; Heart Disease in her brother and brother; High Blood Pressure in her brother, brother, and brother. Review of Systems   All 14-point review of systems are completed and pertinent positives are mentioned in the history of present illness. Other systems are reviewed and are negative. PHYSICAL EXAM:    Vital signs:    /70   Pulse 85   Ht 5' 4\" (1.626 m)   Wt 196 lb 8 oz (89.1 kg)   SpO2 98%   BMI 33.73 kg/m²      Constitutional and general appearance: alert, cooperative, no distress and appears stated age  HEENT: PERRL, no cervical lymphadenopathy. No masses palpable. Normal oral mucosa  Respiratory:  · Normal excursion and expansion without use of accessory muscles  · Resp auscultation: Normal breath sounds without wheezing, rhonchi, and rales  Cardiovascular:  · The apical impulse is not displaced  · Heart tones are crisp and normal. regular S1 and S2.  · Jugular venous pulsation Normal  · The carotid upstroke is normal in amplitude and contour without delay or bruit  · Peripheral pulses are symmetrical and full   Abdomen:  · No masses or tenderness  · Bowel sounds present  Extremities:  ·  No cyanosis or clubbing  ·  No lower extremity edema  ·  Skin: warm and dry  Neurological:  · Alert and oriented  · + left sided weakness  · + slowed speech    DATA:    ECG 9/25/2020  SR with RBBB HR 81    Echo 8/10/2019:  Left ventricular systolic function is normal with a visually estimated ejection fraction of 55%. Mild concentric left ventricular hypertrophy. No regional wall motion abnormalities are noted. Diastolic filling parameters suggests impaired left ventricular relaxation. The aortic arch is mildly dilated at 3.9 cm. Consider other imaging modalities (e.g. CT) to assess for evaluation of aorta.       Mild mitral regurgitation. Stress test 8/10/2019: There is normal isotope uptake at stress and rest. There is no evidence of myocardial ischemia or scar.    Normal LV function.    Overall findings represent a low risk scan.         [facility] 12/2012 ([place]):  LM <20%  LAD 50% . FFR 0.91  LCx 20-30%  RCA < 20%  LVEF 60%     Add statin  D/C later today    All labs and testing reviewed. CARDIOLOGY LABS:   CBC: No results for input(s): WBC, HGB, HCT, PLT in the last 72 hours. BMP: No results for input(s): NA, K, CO2, BUN, CREATININE, LABGLOM, GLUCOSE in the last 72 hours. PT/INR: No results for input(s): PROTIME, INR in the last 72 hours. APTT:No results for input(s): APTT in the last 72 hours. FASTING LIPID PANEL:  Lab Results   Component Value Date    HDL 47 07/23/2018    LDLDIRECT 125 07/02/2015    LDLCALC 60 07/23/2018    TRIG 125 07/23/2018     LIVER PROFILE:No results for input(s): AST, ALT, ALB in the last 72 hours. Assessment:   Paroxysmal atrial fibrillation: stable   -[id number] score 7 (age, gender, HTN, CVA, DM)  RBBB: chronic, stable   HTN: controlled   Nonobstructive CAD: stable   -s/p [facility] 2012   -stress test negative 2019  Chronic atypical chest pain: worsening  Dilated aortic arch  Childhood rheumatic fever  Recurrent CVA with left sided weakness  Acquired hypothyroidism: s/p thyroidectomy   Hematuria and hematochezia: new problem     Plan:   1. Continue lisinopril, magnesium, amlodipine, and Coumadin (PCP managing)  2. Can switch to Eliquis or Xarelto if affordable  3. Monitor BP at home and call if consistently out of goal ranges   4. PCP is working up hematuria/hematochezia. Patient needs cysto and colonoscopy. Can consider Watchman if bleeding is recurrent. 5. Follow up in 6 months with EP   6. Follow up with interventional cardiology ASAP for chest pain    ANANT Werner-CNP.   [facility]  [phone]

## 2020-09-25 NOTE — LETTER
[address]   Electrophysiology Outpatient Note              Date:  September 25, 2020  Patient name: Keila Duran  YOB: 1940    Primary Care physician: ANANT Beckwith CNP    HISTORY OF PRESENT ILLNESS: The patient is an 79 y.o.  female with a history of PAF, RBBB, HTN, nonobstructive CAD, chronic chest pain, childhood rheumatic fever, dilated aortic arch, recurrent CVA, acquired hypothyroidism. SHE IS A POOR HISTORIAN. LHC in 12/2012 showed nonobstructive CAD. In 11/2015 she had a loop recorder implanted due to CVA and notes indicated atrial arrhythmias were detected. Loop recorder was explanted in 5/2017. Echo in 8/2019 showed an EF of 55%. Stress test in 8/2019 was negative. Due to chest pain and palpitations, a loop recorder was recommended in 9/2019 but she declined. She was admitted in 3/2020 with chest pain and workup was negative except BP was elevated. Patient has remained in sinus at subsequent visits. Today she is being seen for PAF. EKG shows SR with RBBB with a HR of 81. Patient complains of recent hematuria and rectal bleeding. Is being worked up by PCP. She is requesting a prescription for NTG for ongoing chest pain (sharp, midsternal, usually occurs at 4am, relieved by NTG). Has taken her 's NTG twice since last visit. Also has SOB when flat, dizziness, and chronic left sided weakness/left leg swelling. Denies palpitations. Past Medical History:   has a past medical history of Atrial fibrillation ([facility] [address] Cancer [facility]), Cerebral artery occlusion with cerebral infarction ([facility] [address] Diabetes mellitus ([facility] [address] Fractures, Hypertension, and Thyroid disease. Past Surgical History:   has a past surgical history that includes Thyroidectomy; Hysterectomy; Cholecystectomy; cyst removal; Hand surgery; Ankle fracture surgery (2019); Hand surgery (2/21/12); and Ankle surgery (Left, 6/17/2014).      Home Medications:    Prior to Admission medications Medication Sig Start Date End Date Taking? Authorizing Provider   b complex vitamins capsule Take 1 capsule by mouth daily   Yes Historical Provider, MD   Continuous Blood Gluc Sensor (FREESTYLE ASHELY 14 DAY SENSOR) MISC 2 Units by Does not apply route every 14 days 6/26/20  Yes ANANT Mancilla CNP   Insulin Syringe-Needle U-100 (INSULIN SYRINGE 1CC/30GX5/16\") 30G X 5/16\" 1 ML MISC 1 each by Does not apply route 3 times daily 5/19/20  Yes ANANT Mancilla CNP   amLODIPine (NORVASC) 2.5 MG tablet Take 1 tablet by mouth daily 3/10/20  Yes ANANT Toribio CNP   warfarin (COUMADIN) 7.5 MG tablet Take 7.5 mg by mouth Tues-Thur-Sat   Yes Historical Provider, MD   insulin regular (HUMULIN R;NOVOLIN R) 100 UNIT/ML injection Inject 44-46 Units into the skin Daily with supper   Yes Historical Provider, MD   insulin 70-30 (HUMULIN 70/30) (70-30) 100 UNIT per ML injection vial Inject 44-46 Units into the skin 2 times daily Morning and bedtime   Yes Historical Provider, MD   levothyroxine (SYNTHROID) 112 MCG tablet Take 112 mcg by mouth Daily  2/26/20  Yes Historical Provider, MD   rosuvastatin (CRESTOR) 10 MG tablet Take 1 tablet by mouth daily 7/25/18  Yes ANANT Shin CNP   lisinopril (PRINIVIL;ZESTRIL) 40 MG tablet Take 40 mg by mouth daily   Yes Historical Provider, MD   magnesium (MAGNESIUM-OXIDE) 250 MG TABS tablet Take 250 mg by mouth daily 3x/week   Yes Historical Provider, MD   nystatin (MYCOSTATIN) 764495 UNIT/GM cream  2/10/20   Historical Provider, MD   Omega-3 Fatty Acids (FISH OIL) 1200 MG CAPS Take 1 capsule by mouth 2 times daily     Historical Provider, MD   vitamin B-12 (CYANOCOBALAMIN) 1000 MCG tablet Take 1,000 mcg by mouth daily 3x/week    Historical Provider, MD       Allergies:  Cephalexin; Contrast [iodides]; and Barium-containing compounds    Social History:   reports that she has never smoked.  She has never used modalities (e.g. CT) to assess for evaluation of aorta. Mild mitral regurgitation. Stress test 8/10/2019: There is normal isotope uptake at stress and rest. There is no evidence of myocardial ischemia or scar.    Normal LV function.    Overall findings represent a low risk scan.         [facility] 12/2012 ([place]):  LM <20%  LAD 50% . FFR 0.91  LCx 20-30%  RCA < 20%  LVEF 60%     Add statin  D/C later today    All labs and testing reviewed. CARDIOLOGY LABS:   CBC: No results for input(s): WBC, HGB, HCT, PLT in the last 72 hours. BMP: No results for input(s): NA, K, CO2, BUN, CREATININE, LABGLOM, GLUCOSE in the last 72 hours. PT/INR: No results for input(s): PROTIME, INR in the last 72 hours. APTT:No results for input(s): APTT in the last 72 hours. FASTING LIPID PANEL:  Lab Results   Component Value Date    HDL 47 07/23/2018    LDLDIRECT 125 07/02/2015    LDLCALC 60 07/23/2018    TRIG 125 07/23/2018     LIVER PROFILE:No results for input(s): AST, ALT, ALB in the last 72 hours. Assessment:   Paroxysmal atrial fibrillation: stable   -[id number] score 7 (age, gender, HTN, CVA, DM)  RBBB: chronic, stable   HTN: controlled   Nonobstructive CAD: stable   -s/p [facility] 2012   -stress test negative 2019  Chronic atypical chest pain: worsening  Dilated aortic arch  Childhood rheumatic fever  Recurrent CVA with left sided weakness  Acquired hypothyroidism: s/p thyroidectomy   Hematuria and hematochezia: new problem     Plan:   1. Continue lisinopril, magnesium, amlodipine, and Coumadin (PCP managing)  2. Can switch to Eliquis or Xarelto if affordable  3. Monitor BP at home and call if consistently out of goal ranges   4. PCP is working up hematuria/hematochezia. Patient needs cysto and colonoscopy. Can consider Watchman if bleeding is recurrent. 5. Follow up in 6 months with EP   6. Follow up with interventional cardiology ASAP for chest pain    ANANT Goldstein-CNP.   [facility]  [phone]

## 2020-09-30 ENCOUNTER — TELEPHONE (OUTPATIENT)
Dept: ENDOCRINOLOGY | Age: 80
End: 2020-09-30

## 2020-10-01 ENCOUNTER — OFFICE VISIT (OUTPATIENT)
Dept: CARDIOLOGY CLINIC | Age: 80
End: 2020-10-01
Payer: MEDICARE

## 2020-10-01 VITALS
HEART RATE: 76 BPM | SYSTOLIC BLOOD PRESSURE: 148 MMHG | BODY MASS INDEX: 33.64 KG/M2 | DIASTOLIC BLOOD PRESSURE: 76 MMHG | WEIGHT: 196 LBS | OXYGEN SATURATION: 95 %

## 2020-10-01 PROBLEM — R07.2 PRECORDIAL PAIN: Status: ACTIVE | Noted: 2020-10-01

## 2020-10-01 PROCEDURE — 99214 OFFICE O/P EST MOD 30 MIN: CPT | Performed by: INTERNAL MEDICINE

## 2020-10-01 RX ORDER — PANTOPRAZOLE SODIUM 40 MG/1
40 TABLET, DELAYED RELEASE ORAL DAILY
Qty: 90 TABLET | Refills: 3 | Status: ON HOLD
Start: 2020-10-01 | End: 2020-10-22

## 2020-10-01 NOTE — PROGRESS NOTES
[address] [facility]   Cardiovascular Evaluation    PATIENT: Marguerite Knox  DATE: 10/1/2020  MRN: <[id number]>  CSN: [id number]  : 1940      Primary Care Doctor: ANANT Bird CNP  Reason for evaluation:   New Patient (CP and clearance for colon)      Subjective:   History of present illness on initial date of evaluation:  Amrita Davis. Dina Castellano is an 69-year-old female with a history of chronic atypical chest pain, paroxysmal atrial fibrillation, non-obstructive coronary artery disease, recurrent CVA with residual left-sided weakness, type II DM, essential hypertension, hyperlipidemia, childhood rheumatic fever, mildly dilated aortic arch, and hypothyroidism referred for further evaluation of chest pain and pre-operative risk assessment prior to planned colonoscopy to evaluate for BRBPR. The patient states that she has been having chest pain on and off for years. Recently, she has been having sharp, mid-sternal pain that usually occurs at night and will wake her from sleep. On occasion it will occur during the day. She denies associated diaphoresis, shortness of breath, lightheadedness, palpitations, nausea, vomiting, or diarrhea. She is limited functionally due to her history of CVA which has left her with residual left-sided weakness and uses a wheelchair to get around. She is unsure if the pain is related to consumption of certain foods. Sometimes the pain is relieved by nitroglycerin. She gets indigestion and was previously taking a daily ant-acid, but stopped taking it awhile ago and now takes OTC ryan seltzer. Recently she has also been having abdominal pain and has noticed BRBPR on the toliet paper after she wipes. Sometimes she will also see small amount of blood in her feces. She has also noticed small amounts of blood in her urine and has been referred to both GI and urology for further evaluation.   Additionally, she says that her blood sugars have been running higher than usual.  Her last HbA1c was 8.5% on 5/2020. She has had multiple previous normal stress tests to evaluate her chest pain, most recently a pharmacologic nuclear SPECT stress test on 8/10/2019 which demonstrated normal myocardial perfusion and preserved LV systolic function. TTE from 8/2019 showed preserved biventricular systolic function, mild mitral regurgitation, and mildly dilated aortic arch measuring 3.9 cm. She last underwent invasive coronary angiography in 2012 and at that time was found to have non-obstructive coronary artery disease with a moderate mid-LAD stenosis that was no physiologically significant by FFR. Patient Active Problem List   Diagnosis    Atypical chest pain    DM hyperosmolarity type II, uncontrolled ([address]    Hypothyroidism    Painful orthopaedic hardware ([address]    Left ankle pain    Left foot pain    Ischemic stroke ([address]    Chronic ischemic heart disease    Essential hypertension    Hyperlipidemia    Transient cerebral ischemia    Encounter for loop recorder check    Closed fracture of proximal end of left humerus    CVA, old, hemiparesis ([address]    Cerebrovascular accident (CVA) ([address]    Vitamin D deficiency    Paroxysmal A-fib ([address]    Coronary artery disease involving native coronary artery of native heart without angina pectoris    RBBB    Precordial pain       Past Medical History:   has a past medical history of Atrial fibrillation ([address] Cancer ([address] Cerebral artery occlusion with cerebral infarction ([address] Diabetes mellitus ([address] Fractures, Hypertension, and Thyroid disease. Surgical History:   has a past surgical history that includes Thyroidectomy; Hysterectomy; Cholecystectomy; cyst removal; Hand surgery; Ankle fracture surgery (2019); Hand surgery (2/21/12); and Ankle surgery (Left, 6/17/2014). Social History:   reports that she has never smoked.  She has never used smokeless tobacco. She reports that she does not drink alcohol or use drugs. Family History:  No history of sudden cardiac death or premature CAD. Home Medications:  Reviewed and are listed in nursing record. and/or listed below  Current Outpatient Medications   Medication Sig Dispense Refill    b complex vitamins capsule Take 1 capsule by mouth daily      nitroGLYCERIN (NITROSTAT) 0.4 MG SL tablet Place 1 tablet under the tongue every 5 minutes as needed for Chest pain 25 tablet 0    Continuous Blood Gluc Sensor (FREESTYLE ASHELY 14 DAY SENSOR) MISC 2 Units by Does not apply route every 14 days 6 each 5    Insulin Syringe-Needle U-100 (INSULIN SYRINGE 1CC/30GX5/16\") 30G X 5/16\" 1 ML MISC 1 each by Does not apply route 3 times daily 270 each 0    amLODIPine (NORVASC) 2.5 MG tablet Take 1 tablet by mouth daily 30 tablet 11    warfarin (COUMADIN) 7.5 MG tablet Take 7.5 mg by mouth Tu-Thur-Sat      insulin regular (HUMULIN R;NOVOLIN R) 100 UNIT/ML injection Inject 44-46 Units into the skin Daily with supper      insulin 70-30 (HUMULIN 70/30) (70-30) 100 UNIT per ML injection vial Inject 44-46 Units into the skin 2 times daily Morning and bedtime      nystatin (MYCOSTATIN) 719541 UNIT/GM cream       levothyroxine (SYNTHROID) 112 MCG tablet Take 112 mcg by mouth Daily       rosuvastatin (CRESTOR) 10 MG tablet Take 1 tablet by mouth daily 90 tablet 3    lisinopril (PRINIVIL;ZESTRIL) 40 MG tablet Take 40 mg by mouth daily      magnesium (MAGNESIUM-OXIDE) 250 MG TABS tablet Take 250 mg by mouth daily 3x/week      vitamin B-12 (CYANOCOBALAMIN) 1000 MCG tablet Take 1,000 mcg by mouth daily 3x/week      Omega-3 Fatty Acids (FISH OIL) 1200 MG CAPS Take 1 capsule by mouth 2 times daily        No current facility-administered medications for this visit. Allergies:  Cephalexin; Contrast [iodides]; and Barium-containing compounds     Review of Systems:   A 14 point review of symptoms completed.  Pertinent positives identified in the HPI, all other review of symptoms negative as below. Review of Systems   Constitutional: Negative for chills, diaphoresis and fever. HENT: Negative for congestion, rhinorrhea and sore throat. Eyes: Negative for photophobia, pain and visual disturbance. Respiratory: Negative for cough and shortness of breath. Cardiovascular: Positive for chest pain and leg swelling. Negative for palpitations. Gastrointestinal: Positive for abdominal pain and blood in stool. Negative for constipation, diarrhea, nausea and vomiting. Endocrine: Negative for cold intolerance and heat intolerance. Genitourinary: Positive for hematuria. Negative for difficulty urinating and dysuria. Musculoskeletal: Positive for gait problem and myalgias. Negative for joint swelling. Skin: Negative for rash and wound. Allergic/Immunologic: Negative for environmental allergies and food allergies. Neurological: Negative for dizziness, syncope and light-headedness. Hematological: Negative for adenopathy. Bruises/bleeds easily. Psychiatric/Behavioral: Negative for dysphoric mood. The patient is not nervous/anxious. Objective:   PHYSICAL EXAM:    Vitals:    10/01/20 1217   BP: (!) 148/76   Pulse:    SpO2:     Weight: 196 lb (88.9 kg)     Wt Readings from Last 3 Encounters:   10/01/20 196 lb (88.9 kg)   09/25/20 196 lb 8 oz (89.1 kg)   03/10/20 196 lb (88.9 kg)     General: Pleasant elderly female in wheelchair. HEENT: Normocephalic, atraumatic, non-icteric, hearing intact, nares normal, mucous membranes moist.  Neck: Supple, trachea midline. No adenopathy. No thyromegaly. No JVD. Heart: Regular rate and rhythm. Normal S1 and S2. Grade I/VI holosystolic murmur. No rubs or gallops. Lungs: Normal respiratory effort. Clear to auscultation bilaterally. No wheezes, rales, or rhonchi. Abdomen: Soft, non-tender. Normoactive bowel sounds. No masses or organomegaly. Skin: No rashes, wounds, or lesions. Pulses: 2+ and symmetric.   Extremities: No clubbing, cyanosis, or edema. Musculoskeletal: Chronic left-sided weakness. Psych: Normal mood and affect. Neuro: Alert and oriented to person, place, and time. Chronic left-sided weakness from previous CVA. LABS   CBC:      Lab Results   Component Value Date    WBC 6.1 03/05/2020    RBC 5.03 03/05/2020    HGB 14.8 03/05/2020    HCT 45.0 03/05/2020    MCV 89.6 03/05/2020    RDW 13.8 03/05/2020     03/05/2020     CMP:  Lab Results   Component Value Date     03/04/2020    K 5.0 03/04/2020     03/04/2020    CO2 27 03/04/2020    BUN 22 03/04/2020    CREATININE 0.7 03/04/2020    GFRAA >60 03/04/2020    GFRAA >60 03/20/2013    AGRATIO 1.3 03/04/2020    LABGLOM >60 03/04/2020    GLUCOSE 212 03/04/2020    PROT 7.3 03/04/2020    PROT 7.4 03/20/2013    CALCIUM 9.0 03/04/2020    BILITOT 0.5 03/04/2020    ALKPHOS 62 03/04/2020    AST 25 03/04/2020    ALT 24 03/04/2020     PT/INR:   No results found for: PTINR  Liver:  No components found for: CHLPL  Lab Results   Component Value Date    ALT 24 03/04/2020    AST 25 03/04/2020    ALKPHOS 62 03/04/2020    BILITOT 0.5 03/04/2020     Lab Results   Component Value Date    LABA1C 8.5 05/20/2020     Lipids:         Lab Results   Component Value Date    TRIG 125 07/23/2018    TRIG 208 (H) 12/05/2016    TRIG 150 10/07/2016            Lab Results   Component Value Date    HDL 47 07/23/2018    HDL 43 12/05/2016    HDL 53 10/07/2016            Lab Results   Component Value Date    LDLCALC 60 07/23/2018    LDLCALC 126 (H) 12/05/2016    LDLCALC 125 (H) 10/07/2016            Lab Results   Component Value Date    LABVLDL 25 07/23/2018    LABVLDL 42 12/05/2016    LABVLDL 30 10/07/2016         CARDIAC DATA   EKG 9/25/20:  Sinus rhythm with RBBB    ECHO:   TTE 8/10/19:   Summary   Left ventricular systolic function is normal with a visually estimated   ejection fraction of 55%. Mild concentric left ventricular hypertrophy. No   regional wall motion abnormalities are noted. Diastolic filling parameters   suggests impaired left ventricular relaxation. The aortic arch is mildly dilated at 3.9 cm. Consider other imaging   modalities (e.g. CT) to assess for evaluation of aorta. Mild mitral regurgitation. STRESS TEST:   Pharmacologic Nuclear SPECT Stress 8/10/19:  Summary There is normal isotope uptake at stress and rest. There is no evidence of  myocardial ischemia or scar. Normal LV function. Overall findings represent a low risk scan. CARDIAC CATH:   [facility] 01/11/00:  FINDINGS:    Normal left ventricular end diastolic pressure.    Normal left ventricular systolic function and ejection fraction estimated at about 60%.     Normal coronaries.    Normal renal arteries.         Cardiac cath:   [address] [place] [address] 12/19/12:      Assessment:     1. Atypical chest pain - Chronic and suspect secondary to GERD. Was previously taking daily ant-acid but stopped for unclear reasons. She underwent invasive coronary angiography in 2012 and had non-obstructive CAD with a moderate mid-LAD lesion that was not physiologically significant by FFR. Since that time, she has had multiple negative ischemic evaluations, most recently a pharmacologic nuclear SPECT stress test in 8/2019 that demonstrated normal myocardial perfusion. Biventricular systolic function preserved on last transthoracic echocardiogram.  2. Pre-operative cardiac risk assessment - Atypical chest pain felt to be due to GI etiology as above. Overall, colonoscopy is a low-risk procedure and at this time the patient does not require additional cardiac evaluation before proceeding. 3. Paroxysmal atrial fibrillation - Currently in sinus rhythm. [id number] is 7. On coumadin. 4. Non-obstructive coronary artery disease  5. Essential hypertension  6. Hyperlipidemia  7. Type II DM  8. H/o CVA  9. BRBPR  10. Hematuria  11. Mildly dilated aortic arch  12. Hypothyroidism    Plan:     1.  No further cardiac evaluation or intervention is required prior to proceeding with planned colonoscopy. 2. Start pantoprazole 40 mg daily for treatment of suspected GERD. 3. Continue coumadin for goal INR 2-3.  4. Continue other medications as previously prescribed. 5. She was encouraged to follow-up with endocrinology for further titration of her insulin regimen given her recent elevated glucose readings. 6. Has been referred to both GI and urology for further evaluation of BRBPR and hematuria. 7. Follow-up with me in 3 months. Scribe's attestation: This note was scribed in the presence of Dr Kenan Mayo by Avery Stone, RN. It is a pleasure to assist in the care of [address] [place] [address]. Please call with any questions. The scribes documentation has been prepared under my direction and personally reviewed by me in its entirety. I confirm that the note above accurately reflects all work, treatment, procedures, and medical decision making performed by me. I, Dr. Melanie Philip, personally performed the services described in this documentation as scribed by Avery Stone RN in my presence, and it is both accurate and complete to the best of our ability.        Melanie Philip, [facility]  [phone] [facility]  [phone] [address] [place] [address]

## 2020-10-03 ASSESSMENT — ENCOUNTER SYMPTOMS
ABDOMINAL PAIN: 1
DIARRHEA: 0
RHINORRHEA: 0
CONSTIPATION: 0
NAUSEA: 0
COUGH: 0
SHORTNESS OF BREATH: 0
VOMITING: 0
SORE THROAT: 0
EYE PAIN: 0
PHOTOPHOBIA: 0
BLOOD IN STOOL: 1

## 2020-10-21 ENCOUNTER — APPOINTMENT (OUTPATIENT)
Dept: GENERAL RADIOLOGY | Age: 80
DRG: 177 | End: 2020-10-21
Payer: MEDICARE

## 2020-10-21 ENCOUNTER — HOSPITAL ENCOUNTER (INPATIENT)
Age: 80
LOS: 6 days | Discharge: HOME | DRG: 177 | End: 2020-10-27
Attending: EMERGENCY MEDICINE | Admitting: HOSPITALIST
Payer: MEDICARE

## 2020-10-21 PROBLEM — J18.9 PNEUMONIA: Status: ACTIVE | Noted: 2020-10-21

## 2020-10-21 LAB
A/G RATIO: 1.4 (ref 1.1–2.2)
ALBUMIN SERPL-MCNC: 4 G/DL (ref 3.4–5)
ALP BLD-CCNC: 68 U/L (ref 40–129)
ALT SERPL-CCNC: 24 U/L (ref 10–40)
ANION GAP SERPL CALCULATED.3IONS-SCNC: 10 MMOL/L (ref 3–16)
AST SERPL-CCNC: 25 U/L (ref 15–37)
BASE EXCESS VENOUS: -0.2 MMOL/L (ref -3–3)
BASOPHILS ABSOLUTE: 0 K/UL (ref 0–0.2)
BASOPHILS RELATIVE PERCENT: 0.6 %
BILIRUB SERPL-MCNC: 0.3 MG/DL (ref 0–1)
BUN BLDV-MCNC: 15 MG/DL (ref 7–20)
CALCIUM SERPL-MCNC: 8.4 MG/DL (ref 8.3–10.6)
CARBOXYHEMOGLOBIN: 1.8 % (ref 0–1.5)
CHLORIDE BLD-SCNC: 98 MMOL/L (ref 99–110)
CO2: 26 MMOL/L (ref 21–32)
CREAT SERPL-MCNC: 0.8 MG/DL (ref 0.6–1.2)
EKG ATRIAL RATE: 90 BPM
EKG DIAGNOSIS: NORMAL
EKG P AXIS: 48 DEGREES
EKG P-R INTERVAL: 168 MS
EKG Q-T INTERVAL: 412 MS
EKG QRS DURATION: 134 MS
EKG QTC CALCULATION (BAZETT): 504 MS
EKG R AXIS: -18 DEGREES
EKG T AXIS: -6 DEGREES
EKG VENTRICULAR RATE: 90 BPM
EOSINOPHILS ABSOLUTE: 0 K/UL (ref 0–0.6)
EOSINOPHILS RELATIVE PERCENT: 1 %
GFR AFRICAN AMERICAN: >60
GFR NON-AFRICAN AMERICAN: >60
GLOBULIN: 2.9 G/DL
GLUCOSE BLD-MCNC: 241 MG/DL (ref 70–99)
HCO3 VENOUS: 25.6 MMOL/L (ref 23–29)
HCT VFR BLD CALC: 41 % (ref 36–48)
HEMOGLOBIN: 13.7 G/DL (ref 12–16)
INR BLD: 1.89 (ref 0.86–1.14)
LYMPHOCYTES ABSOLUTE: 0.8 K/UL (ref 1–5.1)
LYMPHOCYTES RELATIVE PERCENT: 19 %
MCH RBC QN AUTO: 29.5 PG (ref 26–34)
MCHC RBC AUTO-ENTMCNC: 33.4 G/DL (ref 31–36)
MCV RBC AUTO: 88.4 FL (ref 80–100)
METHEMOGLOBIN VENOUS: 0.3 %
MONOCYTES ABSOLUTE: 0.5 K/UL (ref 0–1.3)
MONOCYTES RELATIVE PERCENT: 11.8 %
NEUTROPHILS ABSOLUTE: 2.9 K/UL (ref 1.7–7.7)
NEUTROPHILS RELATIVE PERCENT: 67.6 %
O2 CONTENT, VEN: 14 VOL %
O2 SAT, VEN: 69 %
O2 THERAPY: ABNORMAL
PCO2, VEN: 46.4 MMHG (ref 40–50)
PDW BLD-RTO: 14.1 % (ref 12.4–15.4)
PH VENOUS: 7.36 (ref 7.35–7.45)
PLATELET # BLD: 132 K/UL (ref 135–450)
PMV BLD AUTO: 9 FL (ref 5–10.5)
PO2, VEN: 37.5 MMHG (ref 25–40)
POTASSIUM REFLEX MAGNESIUM: 3.9 MMOL/L (ref 3.5–5.1)
PRO-BNP: 42 PG/ML (ref 0–449)
PROTHROMBIN TIME: 22.1 SEC (ref 10–13.2)
RBC # BLD: 4.63 M/UL (ref 4–5.2)
SARS-COV-2: DETECTED
SODIUM BLD-SCNC: 134 MMOL/L (ref 136–145)
TCO2 CALC VENOUS: 27 MMOL/L
TOTAL PROTEIN: 6.9 G/DL (ref 6.4–8.2)
TROPONIN: <0.01 NG/ML
WBC # BLD: 4.2 K/UL (ref 4–11)

## 2020-10-21 PROCEDURE — 83036 HEMOGLOBIN GLYCOSYLATED A1C: CPT

## 2020-10-21 PROCEDURE — 6360000002 HC RX W HCPCS: Performed by: NURSE PRACTITIONER

## 2020-10-21 PROCEDURE — 84484 ASSAY OF TROPONIN QUANT: CPT

## 2020-10-21 PROCEDURE — 86140 C-REACTIVE PROTEIN: CPT

## 2020-10-21 PROCEDURE — 82728 ASSAY OF FERRITIN: CPT

## 2020-10-21 PROCEDURE — 84145 PROCALCITONIN (PCT): CPT

## 2020-10-21 PROCEDURE — 80053 COMPREHEN METABOLIC PANEL: CPT

## 2020-10-21 PROCEDURE — 83880 ASSAY OF NATRIURETIC PEPTIDE: CPT

## 2020-10-21 PROCEDURE — 82803 BLOOD GASES ANY COMBINATION: CPT

## 2020-10-21 PROCEDURE — 85610 PROTHROMBIN TIME: CPT

## 2020-10-21 PROCEDURE — 85379 FIBRIN DEGRADATION QUANT: CPT

## 2020-10-21 PROCEDURE — 6360000002 HC RX W HCPCS: Performed by: HOSPITALIST

## 2020-10-21 PROCEDURE — 93010 ELECTROCARDIOGRAM REPORT: CPT | Performed by: INTERNAL MEDICINE

## 2020-10-21 PROCEDURE — 99285 EMERGENCY DEPT VISIT HI MDM: CPT

## 2020-10-21 PROCEDURE — 85025 COMPLETE CBC W/AUTO DIFF WBC: CPT

## 2020-10-21 PROCEDURE — 71045 X-RAY EXAM CHEST 1 VIEW: CPT

## 2020-10-21 PROCEDURE — 96374 THER/PROPH/DIAG INJ IV PUSH: CPT

## 2020-10-21 PROCEDURE — 93005 ELECTROCARDIOGRAM TRACING: CPT | Performed by: NURSE PRACTITIONER

## 2020-10-21 PROCEDURE — 1200000000 HC SEMI PRIVATE

## 2020-10-21 RX ORDER — NICOTINE POLACRILEX 4 MG
15 LOZENGE BUCCAL PRN
Status: DISCONTINUED | OUTPATIENT
Start: 2020-10-21 | End: 2020-10-27 | Stop reason: HOSPADM

## 2020-10-21 RX ORDER — DEXTROSE MONOHYDRATE 50 MG/ML
100 INJECTION, SOLUTION INTRAVENOUS PRN
Status: DISCONTINUED | OUTPATIENT
Start: 2020-10-21 | End: 2020-10-27 | Stop reason: HOSPADM

## 2020-10-21 RX ORDER — POLYETHYLENE GLYCOL 3350 17 G/17G
17 POWDER, FOR SOLUTION ORAL DAILY PRN
Status: DISCONTINUED | OUTPATIENT
Start: 2020-10-21 | End: 2020-10-27 | Stop reason: HOSPADM

## 2020-10-21 RX ORDER — DEXAMETHASONE SODIUM PHOSPHATE 10 MG/ML
6 INJECTION, SOLUTION INTRAMUSCULAR; INTRAVENOUS EVERY 24 HOURS
Status: DISCONTINUED | OUTPATIENT
Start: 2020-10-22 | End: 2020-10-27 | Stop reason: HOSPADM

## 2020-10-21 RX ORDER — CHOLECALCIFEROL (VITAMIN D3) 125 MCG
1000 CAPSULE ORAL DAILY
Status: DISCONTINUED | OUTPATIENT
Start: 2020-10-22 | End: 2020-10-27 | Stop reason: HOSPADM

## 2020-10-21 RX ORDER — PROMETHAZINE HYDROCHLORIDE 25 MG/1
12.5 TABLET ORAL EVERY 6 HOURS PRN
Status: DISCONTINUED | OUTPATIENT
Start: 2020-10-21 | End: 2020-10-22

## 2020-10-21 RX ORDER — ONDANSETRON 2 MG/ML
4 INJECTION INTRAMUSCULAR; INTRAVENOUS EVERY 6 HOURS PRN
Status: DISCONTINUED | OUTPATIENT
Start: 2020-10-21 | End: 2020-10-22

## 2020-10-21 RX ORDER — DEXTROSE MONOHYDRATE 25 G/50ML
12.5 INJECTION, SOLUTION INTRAVENOUS PRN
Status: DISCONTINUED | OUTPATIENT
Start: 2020-10-21 | End: 2020-10-27 | Stop reason: HOSPADM

## 2020-10-21 RX ORDER — LEVOFLOXACIN 5 MG/ML
500 INJECTION, SOLUTION INTRAVENOUS ONCE
Status: DISCONTINUED | OUTPATIENT
Start: 2020-10-21 | End: 2020-10-21

## 2020-10-21 RX ORDER — PANTOPRAZOLE SODIUM 40 MG/1
40 TABLET, DELAYED RELEASE ORAL DAILY
Status: DISCONTINUED | OUTPATIENT
Start: 2020-10-22 | End: 2020-10-27 | Stop reason: HOSPADM

## 2020-10-21 RX ORDER — LEVOFLOXACIN 5 MG/ML
500 INJECTION, SOLUTION INTRAVENOUS EVERY 24 HOURS
Status: DISCONTINUED | OUTPATIENT
Start: 2020-10-21 | End: 2020-10-22

## 2020-10-21 RX ORDER — ACETAMINOPHEN 325 MG/1
650 TABLET ORAL EVERY 6 HOURS PRN
Status: DISCONTINUED | OUTPATIENT
Start: 2020-10-21 | End: 2020-10-27 | Stop reason: HOSPADM

## 2020-10-21 RX ORDER — NITROGLYCERIN 0.4 MG/1
0.4 TABLET SUBLINGUAL EVERY 5 MIN PRN
Status: DISCONTINUED | OUTPATIENT
Start: 2020-10-21 | End: 2020-10-27 | Stop reason: HOSPADM

## 2020-10-21 RX ORDER — LISINOPRIL 20 MG/1
40 TABLET ORAL DAILY
Status: DISCONTINUED | OUTPATIENT
Start: 2020-10-22 | End: 2020-10-27 | Stop reason: HOSPADM

## 2020-10-21 RX ORDER — AMLODIPINE BESYLATE 2.5 MG/1
2.5 TABLET ORAL DAILY
Status: DISCONTINUED | OUTPATIENT
Start: 2020-10-22 | End: 2020-10-23

## 2020-10-21 RX ORDER — DEXAMETHASONE SODIUM PHOSPHATE 10 MG/ML
10 INJECTION, SOLUTION INTRAMUSCULAR; INTRAVENOUS ONCE
Status: COMPLETED | OUTPATIENT
Start: 2020-10-21 | End: 2020-10-21

## 2020-10-21 RX ORDER — ROSUVASTATIN CALCIUM 10 MG/1
10 TABLET, COATED ORAL DAILY
Status: DISCONTINUED | OUTPATIENT
Start: 2020-10-22 | End: 2020-10-27 | Stop reason: HOSPADM

## 2020-10-21 RX ORDER — LEVOTHYROXINE SODIUM 112 UG/1
112 TABLET ORAL DAILY
Status: DISCONTINUED | OUTPATIENT
Start: 2020-10-22 | End: 2020-10-27 | Stop reason: HOSPADM

## 2020-10-21 RX ORDER — SODIUM CHLORIDE 0.9 % (FLUSH) 0.9 %
10 SYRINGE (ML) INJECTION EVERY 12 HOURS SCHEDULED
Status: DISCONTINUED | OUTPATIENT
Start: 2020-10-21 | End: 2020-10-27 | Stop reason: HOSPADM

## 2020-10-21 RX ORDER — SODIUM CHLORIDE 0.9 % (FLUSH) 0.9 %
10 SYRINGE (ML) INJECTION PRN
Status: DISCONTINUED | OUTPATIENT
Start: 2020-10-21 | End: 2020-10-27 | Stop reason: HOSPADM

## 2020-10-21 RX ORDER — ACETAMINOPHEN 650 MG/1
650 SUPPOSITORY RECTAL EVERY 6 HOURS PRN
Status: DISCONTINUED | OUTPATIENT
Start: 2020-10-21 | End: 2020-10-27 | Stop reason: HOSPADM

## 2020-10-21 RX ADMIN — DEXAMETHASONE SODIUM PHOSPHATE 10 MG: 10 INJECTION, SOLUTION INTRAMUSCULAR; INTRAVENOUS at 16:36

## 2020-10-21 RX ADMIN — LEVOFLOXACIN 500 MG: 5 INJECTION, SOLUTION INTRAVENOUS at 23:12

## 2020-10-21 ASSESSMENT — ENCOUNTER SYMPTOMS
COLOR CHANGE: 0
RHINORRHEA: 0
ABDOMINAL PAIN: 0
SHORTNESS OF BREATH: 1
SORE THROAT: 0

## 2020-10-21 NOTE — LETTER
Deirdre Stanley  1940   AGREEMENT FOR SELF-ISOLATION FOR COVID-19         Since you have been tested for SARS-CoV-2 or Coronavirus Disease (COVID 19) you are required consistent with the recommendations of the Centers for Disease Control & Prevention (CDC)  to self-isolate until receiving confirmation of a negative test or for 14 days after the first onset of symptoms. The time period for self-isolation will be specified at the time of collecting the nasopharyngeal test sample. To prevent possible transmission of COVID 19 to others, I agree to follow the measures described below until notified by by my provider or [facility] ([facility]) or [employer]. I understand these measures based upon recommendations of the CDC are necessary to minimize the exposure of others to COVID 19.    ? I agree to remain in my residence. I agree not to leave my residence unless it is absolutely necessary (such as in an emergency). If I must leave my home for any reason or develop symptoms consistent with COVID 19, I agree to notify my local health department as soon as possible. ? I agree to not have visitors in my residence. ? I understand that the mask will reduce the exposure of others to COVID 19.    ? I agree to wear a mask and utilize additional precautions, as instructed by [facility] ([facility]) or my provider, if I must leave my home for a doctor appointment with my physician or any clinic during the 14-day isolation period (or negative test result). ? I agree to not utilize public transportation for the 14-day isolation period. I understand additional information is available on what to do if you are sick at [email or web address]. html      Contact Information for [address] [place] [address] of Patient Residence: ***           Signature of Responsible Party  Relationship to Patient  Date                 Witness

## 2020-10-21 NOTE — ED PROVIDER NOTES
[address] ED  EMERGENCY DEPARTMENT ENCOUNTER        Pt Name: Ken Chavez  MRN: [id number]  [place] 1940  Date of evaluation: 10/21/2020  Provider: ANANT Dubon CNP  PCP: ANANT Kam CNP  ED Attending: No att. providers found    [facility]       Chief Complaint   Patient presents with    Shortness of Breath       HISTORY OF PRESENT ILLNESS   (Location/Symptom, Timing/Onset, Context/Setting, Quality, Duration, Modifying Factors, Severity)  Note limiting factors. Ken Chavez is a 79 y.o. female for shortness of breath. Onset was the last few days. Context includes patient states that her  just tested positive For Covid. Patient states that she has had shortness of breath for the last few days. She reports that she was seen by her primary care doctor and had an oxygen saturation of 88% was sent to the ED to be evaluated. Alleviating factors include nothing. Aggravating factors include nothing. Pain is 0/10. Nothing has been used for pain today. Nursing Notes were all reviewed and agreed with or any disagreements were addressed  in the HPI. REVIEW OF SYSTEMS  (2-9 systems for level 4, 10 or more for level 5)     Review of Systems   Constitutional: Negative for fever. Hypoxia   HENT: Negative for congestion, rhinorrhea and sore throat. Respiratory: Positive for shortness of breath. Cardiovascular: Negative for chest pain. Gastrointestinal: Negative for abdominal pain. Genitourinary: Negative for decreased urine volume and difficulty urinating. Musculoskeletal: Negative for arthralgias and myalgias. Skin: Negative for color change and rash. Neurological: Negative for dizziness and light-headedness. Psychiatric/Behavioral: Negative for agitation. All other systems reviewed and are negative. Positivesand Pertinent negatives as per HPI.   Except as noted above in the ROS, all other systems were reviewed and PANTOPRAZOLE (PROTONIX) 40 MG TABLET    Take 1 tablet by mouth daily    ROSUVASTATIN (CRESTOR) 10 MG TABLET    Take 1 tablet by mouth daily    VITAMIN B-12 (CYANOCOBALAMIN) 1000 MCG TABLET    Take 1,000 mcg by mouth daily 3x/week    WARFARIN (COUMADIN) 7.5 MG TABLET    Take 7.5 mg by mouth Tues-Thur-Sat         ALLERGIES     Cephalexin;  Contrast [iodides]; and Barium-containing compounds    FAMILY HISTORY       Family History   Problem Relation Age of Onset    Cancer Mother     Diabetes Mother     Cancer Brother     High Blood Pressure Brother     Depression Maternal Aunt     Heart Disease Brother     Diabetes Brother     High Blood Pressure Brother     Heart Disease Brother     Diabetes Brother     High Blood Pressure Brother          SOCIAL HISTORY       Social History     Socioeconomic History    Marital status:      Spouse name: None    Number of children: None    Years of education: None    Highest education level: None   Occupational History    None   Social Needs    Financial resource strain: None    Food insecurity     Worry: None     Inability: None    Transportation needs     Medical: None     Non-medical: None   Tobacco Use    Smoking status: Never Smoker    Smokeless tobacco: Never Used   Substance and Sexual Activity    Alcohol use: No    Drug use: No    Sexual activity: Not Currently   Lifestyle    Physical activity     Days per week: None     Minutes per session: None    Stress: None   Relationships    Social connections     Talks on phone: None     Gets together: None     Attends Baptism service: None     Active member of club or organization: None     Attends meetings of clubs or organizations: None     Relationship status: None    Intimate partner violence     Fear of current or ex partner: None     Emotionally abused: None     Physically abused: None     Forced sexual activity: None   Other Topics Concern    None   Social History Narrative    None 1.8 (*)     All other components within normal limits    Narrative:     Performed at:  [facility] ([facility]) - [facility],  [place], [facility]   Phone [phone]   PROTIME-INR - Abnormal; Notable for the following components:    Protime 22.1 (*)     INR 1.89 (*)     All other components within normal limits    Narrative:     Performed at:  [facility],  [place], [facility]   Phone [phone]   TROPONIN    Narrative:     Performed at:  [place] [address],  [place], [facility]   Phone [phone]   BRAIN NATRIURETIC PEPTIDE    Narrative:     Performed at:  [facility]) - [facility],  [place], [facility]   Phone [phone]   URINE RT REFLEX TO CULTURE       All other labs were within normal range or not returned as of this dictation. EKG: All EKG's are interpreted by the Emergency Department Physician who either signs or Co-signs this chart in the absence of a cardiologist.  Please see their note for interpretation of EKG. RADIOLOGY:   Portable chest x-ray interpreted by radiologist for  FINDINGS:   Patchy perihilar and bibasilar pulmonary opacities.  Lungs hypoinflated. Possible trace right effusion.  No pneumothorax.  Unchanged cardiomegaly. Age   related degenerative changes of the visualized osseous structures without   focal destructive lesion. Interpretation per the Radiologist below, if available at the time of this note:    XR CHEST PORTABLE   Final Result   Hypoinflated lungs with patchy perihilar and bibasilar opacities which may be   on the basis of edema or infection in the appropriate clinical setting. Probable trace right pleural effusion. No results found.       PROCEDURES   Unless otherwise noted below, none     Procedures    CRITICAL CARE TIME   N/A    CONSULTS:  IP CONSULT TO HOSPITALIST      EMERGENCY DEPARTMENT COURSE and DIFFERENTIAL DIAGNOSIS/MDM:   Vitals:    Vitals:    10/21/20 1650 10/21/20 1653 10/21/20 1700 10/21/20 1750   BP: (!) 163/79      Pulse: 90 92 92    Resp: 22 (!) 31 25    Temp:       SpO2: 93% 98% 92% 90%       Patient was given the following medications:  Medications   levoFLOXacin (LEVAQUIN) 500 MG/100ML infusion 500 mg (has no administration in time range)   dexamethasone (PF) (DECADRON) injection 10 mg (10 mg Intravenous Given 10/21/20 1636)         Patient was seen and evaluated by myself and .  Patient here today for complaints of shortness of breath. Patient reports that her  tested positive for Covid. Patient reports that she has a Covid test that is currently pending. Patient states that she has not had any fevers nausea vomiting or diarrhea. She has had some shortness of breath. On exam she is awake and alert she was found to be hypoxic with her oxygen saturations in the 88% at the primary care doctor today. Lab values have all been reviewed and interpreted. Patient did have a chest x-ray that was concerning for infiltrates. Patient was ambulated in the ED. She did become short of breath with ambulation however her heart rate stayed in the 80s. Her oxygen saturation was in the 90s on room air. Consult was placed to the daytime hospitalist who felt the patient was appropriate to be discharged home. Upon further evaluation of the patient she continues to be tachypneic and short of breath. A consult was placed to the oncoming hospitalist by Brent Castaneda and the hospitalist is accepted the patient for admission. Patient's care was transferred to the inpatient unit. The patient tolerated their visit well. They were seen and evaluated by the attending physician, No att. providers found who agreed with the assessment and plan.   The patient and / or the family were informed of the results of any tests, a time was given to answer questions, a plan was proposed and they agreed with plan. FINAL IMPRESSION      1. Pneumonia due to organism    2. Close exposure to COVID-19 virus          DISPOSITION/PLAN   DISPOSITION        PATIENT REFERRED TO:  No follow-up provider specified.     DISCHARGE MEDICATIONS:  New Prescriptions    No medications on file       DISCONTINUED MEDICATIONS:  Discontinued Medications    No medications on file              (Please note that portions of this note were completed with a voice recognition program.  Efforts were made to edit the dictations but occasionally words are mis-transcribed.)    ANANT Syed CNP (electronically signed)       ANANT Syed CNP  10/21/20 3404

## 2020-10-22 LAB
AMORPHOUS: ABNORMAL /HPF
BACTERIA: ABNORMAL /HPF
BILIRUBIN URINE: NEGATIVE
BLOOD, URINE: NEGATIVE
C-REACTIVE PROTEIN: 31.7 MG/L (ref 0–5.1)
CLARITY: CLEAR
COLOR: YELLOW
D DIMER: <200 NG/ML DDU (ref 0–229)
EPITHELIAL CELLS, UA: ABNORMAL /HPF (ref 0–5)
FERRITIN: 196.5 NG/ML (ref 15–150)
GLUCOSE BLD-MCNC: 326 MG/DL (ref 70–99)
GLUCOSE BLD-MCNC: 340 MG/DL (ref 70–99)
GLUCOSE BLD-MCNC: 350 MG/DL (ref 70–99)
GLUCOSE BLD-MCNC: 355 MG/DL (ref 70–99)
GLUCOSE BLD-MCNC: 439 MG/DL (ref 70–99)
GLUCOSE URINE: >=1000 MG/DL
HYALINE CASTS: ABNORMAL /LPF (ref 0–2)
INR BLD: 1.93 (ref 0.86–1.14)
KETONES, URINE: 15 MG/DL
LEUKOCYTE ESTERASE, URINE: NEGATIVE
MICROSCOPIC EXAMINATION: YES
NITRITE, URINE: NEGATIVE
PERFORMED ON: ABNORMAL
PH UA: 5 (ref 5–8)
PROCALCITONIN: 0.06 NG/ML (ref 0–0.15)
PROTEIN UA: ABNORMAL MG/DL
PROTHROMBIN TIME: 22.5 SEC (ref 10–13.2)
RBC UA: ABNORMAL /HPF (ref 0–4)
SPECIFIC GRAVITY UA: 1.02 (ref 1–1.03)
URINE REFLEX TO CULTURE: ABNORMAL
URINE TYPE: ABNORMAL
UROBILINOGEN, URINE: 0.2 E.U./DL
VITAMIN D 25-HYDROXY: 43.2 NG/ML
WBC UA: ABNORMAL /HPF (ref 0–5)

## 2020-10-22 PROCEDURE — 97116 GAIT TRAINING THERAPY: CPT

## 2020-10-22 PROCEDURE — 97535 SELF CARE MNGMENT TRAINING: CPT

## 2020-10-22 PROCEDURE — 6370000000 HC RX 637 (ALT 250 FOR IP): Performed by: INTERNAL MEDICINE

## 2020-10-22 PROCEDURE — 6360000002 HC RX W HCPCS: Performed by: INTERNAL MEDICINE

## 2020-10-22 PROCEDURE — 97110 THERAPEUTIC EXERCISES: CPT

## 2020-10-22 PROCEDURE — 97165 OT EVAL LOW COMPLEX 30 MIN: CPT

## 2020-10-22 PROCEDURE — 1200000000 HC SEMI PRIVATE

## 2020-10-22 PROCEDURE — 2580000003 HC RX 258: Performed by: HOSPITALIST

## 2020-10-22 PROCEDURE — 6360000002 HC RX W HCPCS: Performed by: HOSPITALIST

## 2020-10-22 PROCEDURE — 6370000000 HC RX 637 (ALT 250 FOR IP): Performed by: HOSPITALIST

## 2020-10-22 PROCEDURE — 85610 PROTHROMBIN TIME: CPT

## 2020-10-22 PROCEDURE — 99232 SBSQ HOSP IP/OBS MODERATE 35: CPT | Performed by: INTERNAL MEDICINE

## 2020-10-22 PROCEDURE — 97530 THERAPEUTIC ACTIVITIES: CPT

## 2020-10-22 PROCEDURE — 97161 PT EVAL LOW COMPLEX 20 MIN: CPT

## 2020-10-22 PROCEDURE — 82306 VITAMIN D 25 HYDROXY: CPT

## 2020-10-22 PROCEDURE — 81001 URINALYSIS AUTO W/SCOPE: CPT

## 2020-10-22 PROCEDURE — 36415 COLL VENOUS BLD VENIPUNCTURE: CPT

## 2020-10-22 RX ORDER — SODIUM CHLORIDE AND POTASSIUM CHLORIDE .9; .15 G/100ML; G/100ML
SOLUTION INTRAVENOUS CONTINUOUS
Status: DISCONTINUED | OUTPATIENT
Start: 2020-10-22 | End: 2020-10-25

## 2020-10-22 RX ORDER — WARFARIN SODIUM 7.5 MG/1
7.5 TABLET ORAL
Status: COMPLETED | OUTPATIENT
Start: 2020-10-22 | End: 2020-10-22

## 2020-10-22 RX ORDER — PROCHLORPERAZINE EDISYLATE 5 MG/ML
10 INJECTION INTRAMUSCULAR; INTRAVENOUS EVERY 6 HOURS PRN
Status: DISCONTINUED | OUTPATIENT
Start: 2020-10-22 | End: 2020-10-27 | Stop reason: HOSPADM

## 2020-10-22 RX ADMIN — INSULIN LISPRO 3 UNITS: 100 INJECTION, SOLUTION INTRAVENOUS; SUBCUTANEOUS at 02:17

## 2020-10-22 RX ADMIN — Medication 10 ML: at 22:13

## 2020-10-22 RX ADMIN — PANTOPRAZOLE SODIUM 40 MG: 40 TABLET, DELAYED RELEASE ORAL at 10:13

## 2020-10-22 RX ADMIN — MAGNESIUM GLUCONATE 500 MG ORAL TABLET 200 MG: 500 TABLET ORAL at 10:12

## 2020-10-22 RX ADMIN — CYANOCOBALAMIN TAB 500 MCG 1000 MCG: 500 TAB at 10:13

## 2020-10-22 RX ADMIN — ROSUVASTATIN CALCIUM 10 MG: 10 TABLET, FILM COATED ORAL at 10:13

## 2020-10-22 RX ADMIN — LEVOTHYROXINE SODIUM 112 MCG: 112 TABLET ORAL at 10:13

## 2020-10-22 RX ADMIN — LISINOPRIL 40 MG: 20 TABLET ORAL at 10:13

## 2020-10-22 RX ADMIN — B-COMPLEX W/ C & FOLIC ACID TAB 1 TABLET: TAB at 10:13

## 2020-10-22 RX ADMIN — AMLODIPINE BESYLATE 2.5 MG: 5 TABLET ORAL at 10:13

## 2020-10-22 RX ADMIN — POTASSIUM CHLORIDE AND SODIUM CHLORIDE: 900; 150 INJECTION, SOLUTION INTRAVENOUS at 14:20

## 2020-10-22 RX ADMIN — DEXAMETHASONE SODIUM PHOSPHATE 6 MG: 10 INJECTION INTRAMUSCULAR; INTRAVENOUS at 17:01

## 2020-10-22 RX ADMIN — INSULIN LISPRO 2 UNITS: 100 INJECTION, SOLUTION INTRAVENOUS; SUBCUTANEOUS at 22:26

## 2020-10-22 RX ADMIN — Medication 10 ML: at 10:12

## 2020-10-22 RX ADMIN — INSULIN LISPRO 25 UNITS: 100 INJECTION, SUSPENSION SUBCUTANEOUS at 17:08

## 2020-10-22 RX ADMIN — WARFARIN 7.5 MG: 7.5 TABLET ORAL at 17:08

## 2020-10-22 NOTE — PROGRESS NOTES
Per Dr. Johann Yarbrough, will not need to recollect COVID test if results are back from her PCP office. Pt had a COVID test on 10/20, in office. Call placed to [facility] to see if results are back yet. Per PCP, Miriam Benavidez CNP, results are positive. Asked staff at [facility] to fax over results. Will place results in soft chart, once they are received. Will make Dr. Don Rivera aware of positiveresults.

## 2020-10-22 NOTE — PROGRESS NOTES
Inpatient Occupational Therapy  Evaluation and Treatment    Unit: [facility]  Date:  10/22/2020  Patient Name:    Janes Mcdonald  Admitting diagnosis:  Pneumonia [J18.9]  Admit Date:  10/21/2020  Precautions/Restrictions/WB Status/ Lines/ Wounds/ Oxygen: fall risk, IV, bed/chair alarm and Droplet Plus precautions (+ COVID 19), h/o CVA with L side deficits     Treatment Time:  14:58-15:40  Treatment Number: 1     Billable Treatment Time: 32 minutes   Total Treatment Time:   42 minutes    Patient Goals for Therapy:  \" to go home \"      Discharge Recommendations: SNF  DME needs for discharge: defer to facility       Therapy recommendations for staff:   Assist of 1 with use of rolling walker (RW) and gait belt for all ambulation to/from bathroom    History of Present Illness: 79 y.o. female with 3 days worsening sob / coker / fatigue / diarrhea. Her  was diagnosed with Covid19 3 weeks ago. She has had a cough productive of yellowish sputum, but has felt well until 3-4 days ago. She's noted progressive generalized weakness, non productive cough and sob/coker. She denies chest pain, leg edema, fever, chills, change in taste or smell, or headache. Home Health S4 Level Recommendation:  NA  AM-PAC Score: AM-PAC Inpatient Daily Activity Raw Score: 13    Preadmission Environment    Pt. Lives with family (spouse and 2 sons)  Home environment:  two story home (bedroom and bath on 1st floor)   Steps to enter first floor:   No steps    Steps to second floor: N/A  Bathroom:  Tub/Shower unit, Grab bars, Shower Chair  and Raised toilet seat w/ arms  Equipment owned:  [address]  and hospital bed     Preadmission Status / PLOF:  History of falls   No  Pt. Able to drive   No  Pt Fully independent with ADL's  No  Pt. Required assistance from family for:  Bathing, Cleaning, Cooking, Dressing and Laundry     Pt.  Fully independent for transfers and gait and walked with: Walker    Pain  No  Rating:NA  Location:  Pain Medicine Status: occupational therapy services to maximize safety and independence. Goal(s) : To be met in 3 Visits:  1). Bed to toilet/BSC: Supervision    To be met in 5 Visits:  1). Supine to/from Sit:  SBA  2). Upper Body Bathing:   SBA  3). Lower Body Bathing:   Min A  4). Upper Body Dressing:  SBA  5). Lower Body Dressing:  Min A  6). Pt to demonstrate UE exs x 15 reps with minimal cues    Rehabilitation Potential:  Fair for goals listed above. Strengths for achieving goals include: Pt motivated, PLOF and Pt cooperative  Barriers to achieving goals include:  Complexity of condition     Plan: To be seen 3-5 x/wk while in acute care setting for therapeutic exercises, bed mobility, transfers, dressing, bathing, family/patient education, ADL/IADL retraining, energy conservation training.        Sonja Owens OTR/L #[id number]        If patient discharges from this facility prior to next visit, this note will serve as the Discharge Summary

## 2020-10-22 NOTE — DISCHARGE INSTR - COC
Continuity of Care Form    Patient Name: Becki Cotto   :  1940  MRN:  [id number]    Admit date:  10/21/2020  Discharge date:  ***    Code Status Order: Full Code   Advance Directives:   Advance Care Flowsheet Documentation     Date/Time Healthcare Directive Type of Healthcare Directive Copy in [address] Agent's Name Healthcare Agent's Phone Number    10/22/20 1034  No, patient does not have an advance directive for healthcare treatment -- -- -- -- --          Admitting Physician:  Bandar Butts MD  PCP: ANANT Ferguson CNP    Discharging Nurse: [facility] Unit/Room#: 7076/5727-35  Discharging Unit Phone Number: ***    Emergency Contact:   Extended Emergency Contact Information  Primary Emergency Contact: Roger Gracia  Address: [address] [place] [address], [address] [facility] [address]  Phone: [phone]  Mobile Phone: [phone]  Relation: Spouse  Secondary Emergency Contact: Estela [facility] [address]  Phone: [phone]  Mobile Phone: [phone]  Relation: Child    Past Surgical History:  Past Surgical History:   Procedure Laterality Date    ANKLE FRACTURE SURGERY  2019    left ankle    ANKLE SURGERY Left 2014    Removal painful hardware    CHOLECYSTECTOMY      CYST REMOVAL      chest x2, back x1    HAND SURGERY      right    HAND SURGERY  12    DUPUYTRENS RELEASE LEFT MIDDLE AND RING FINGERS INCLUDING    HYSTERECTOMY      THYROIDECTOMY         Immunization History:   Immunization History   Administered Date(s) Administered    Influenza, High Dose (Fluzone 65 yrs and older) 2015    Pneumococcal Conjugate 13-valent (Stacy Allwes) 2016       Active Problems:  Patient Active Problem List   Diagnosis Code    Atypical chest pain R07.89    DM hyperosmolarity type II, uncontrolled ([address] E11.00, E11.65    Hypothyroidism E03.9    Painful orthopaedic hardware ([address] T84. 84XA    Left ankle pain M25.572    Left foot pain M79.672    Ischemic stroke (HCC) I63.9    Chronic ischemic heart disease I25.9    Essential hypertension I10    Hyperlipidemia E78.5    Transient cerebral ischemia G45.9    Encounter for loop recorder check Z45.09    Closed fracture of proximal end of left humerus S42.202A    CVA, old, hemiparesis ([facility]) I69.359    Cerebrovascular accident (CVA) ([address] I63.9    Vitamin D deficiency E55.9    Paroxysmal A-fib ([facility]) I48.0    Coronary artery disease involving native coronary artery of native heart without angina pectoris I25.10    RBBB I45.10    Precordial pain R07.2    Pneumonia J18.9       Isolation/Infection:   Isolation          Droplet Plus        Patient Infection Status     Infection Onset Added Last Indicated Last Indicated By Review Planned Expiration Resolved Resolved By    COVID-19 Rule Out 10/22/20 10/22/20 10/22/20 COVID-19 (Ordered) 10/29/20 11/05/20      PENDING TEST FROM 10/16/20 PER PATIENT          Nurse Assessment:  Last Vital Signs: BP (!) 171/71   Pulse 90   Temp 98 °F (36.7 °C) (Oral)   Resp 18   SpO2 93%   Breastfeeding No     Last documented pain score (0-10 scale):    Last Weight:   Wt Readings from Last 1 Encounters:   10/01/20 196 lb (88.9 kg)     Mental Status:  {IP PT MENTAL STATUS:}    IV Access:  { AMIE IV ACCESS:626414908}    Nursing Mobility/ADLs:  Walking   {[facility] DME BKXP:709183910}  Transfer  {P DME NVEY:091595024}  Bathing  {P DME ZLQI:210246571}  Dressing  {P DME INRM:776134251}  Toileting  {[facility] DME YEVI:424473587}  Feeding  {[facility] DME CXUX:164249566}  Med Admin  {[facility] DME GTZD:499842472}  Med Delivery   { AMIE MED Delivery:822602993}    Wound Care Documentation and Therapy:        Elimination:  Continence:   · Bowel: {YES / DH:44123}  · Bladder: {YES / VZ:70757}  Urinary Catheter: {Urinary Catheter:939729122}   Colostomy/Ileostomy/Ileal Conduit: {YES / M}       Date of Last BM: ***    Intake/Output Summary (Last 24 hours) at 10/22/2020 1422  Last data filed at 10/22/2020 1320  Gross per 24 hour   Intake 340 ml   Output --   Net 340 ml     I/O last 3 completed shifts:   In: 100 [IV Piggyback:100]  Out: -     Safety Concerns:     [address] Safety Concerns:007855923}    Impairments/Disabilities:      [address] [facility] Impairments/Disabilities:807860539}    Nutrition Therapy:  Current Nutrition Therapy:   [address] [facility] Diet List:238588198}    Routes of Feeding: {CHP DME Other Feedings:116279327}  Liquids: {Slp liquid thickness:30964}  Daily Fluid Restriction: {CHP DME Yes amt example:383046633}  Last Modified Barium Swallow with Video (Video Swallowing Test): {Done Not Done FBUQ:030661547}    Treatments at the Time of Hospital Discharge:   Respiratory Treatments: ***  Oxygen Therapy:  {Therapy; copd oxygen:74348}  Ventilator:    { CC Vent MRRT:103236705}    Rehab Therapies: {THERAPEUTIC INTERVENTION:5025998556}  Weight Bearing Status/Restrictions: [address] [place] [address] Weight Bearin}  Other Medical Equipment (for information only, NOT a DME order):  {EQUIPMENT:875914671}  Other Treatments: ***    Patient's personal belongings (please select all that are sent with patient):  {CHP DME Belongings:231022562}    RN SIGNATURE:  {Esignature:661062284}    CASE MANAGEMENT/SOCIAL WORK SECTION    Inpatient Status Date: ***    Readmission Risk Assessment Score:  Readmission Risk              Risk of Unplanned Readmission:        15           Discharging to Facility/ Agency   · Name:   · Address:  · Phone:  · Fax:    Dialysis Facility (if applicable)   · Name:  · Address:  · Dialysis Schedule:  · Phone:  · Fax:    / signature: {Esignature:539803087}    PHYSICIAN SECTION    Prognosis: {Prognosis:3227116093}    Condition at Discharge: [address] Patient Condition:833616912}    Rehab Potential (if transferring to Rehab): {Prognosis:5333393011}    Recommended Labs or Other Treatments After Discharge: ***    Physician Certification: I certify the above information and transfer of Nancy Chambers  is necessary for the continuing treatment of the diagnosis listed and that she requires {Admit to Appropriate Level of Care:72228} for {GREATER/LESS:670493117} 30 days.      Update Admission H&P: {CHP DME Changes in NIHNJ:150974966}    PHYSICIAN SIGNATURE:  {Esignature:670846456}

## 2020-10-22 NOTE — PROGRESS NOTES
Inpatient Physical Therapy Evaluation and Treatment    Unit: [facility]  Date:  10/22/2020  Patient Name:    Staci Najera  Admitting diagnosis:  Pneumonia [J18.9]  Admit Date:  10/21/2020  Precautions/Restrictions/WB Status/ Lines/ Wounds/ Oxygen: Fall risk, Bed/chair alarm, Lines -IV and Isolation Precautions: Droplet Plus - COVID    Treatment Time: 1500 - 7019  Treatment Number:  1   Timed Code Treatment Minutes: 35 minutes  Total Treatment Minutes:  45  minutes    Patient Goals for Therapy: \" To go home \"          Discharge Recommendations: SNF  DME needs for discharge: defer to facility       Therapy recommendation for EMS Transport: can transport by wheelchair    Therapy recommendations for staff:   Assist of 1 with use of rolling walker (RW) and gait belt for all transfers and ambulation to/from chair  to/from bathroom    History of Present Illness: (H & P taken from Elizabeth Arellano MD's note dated 10/21/2020)  79 y.o. female with 3 days worsening sob / coker / fatigue / diarrhea. Her  was diagnosed with Covid19 3 weeks ago. She has had a cough productive of yellowish sputum, but has felt well until 3-4 days ago. She's noted progressive generalized weakness, non productive cough and sob/coker. She denies chest pain, leg edema, fever, chills, change in taste or smell, or headache. Home Health S4 Level Recommendation:  Level 3 Safety  AM-PAC Mobility Score       AM-PAC Inpatient Mobility without Stair Climbing Raw Score : 15    Preadmission Environment    Pt. Lives with family (spouse and 2 sons)  Home environment:    two story home (bedroom and bath on 1st floor)   Steps to enter first floor:   No steps      Steps to second floor: N/A  Bathroom:       Tub/Shower unit, Grab bars, Shower Chair  and Raised toilet seat w/ arms  Equipment owned:      [facility] [address], [address]  and hospital bed      Preadmission Status / PLOF:  History of falls             No  Pt.  Able to drive          No  Pt Fully independent with ADL's No  Pt. Required assistance from family for:  Bathing, Cleaning, Cooking, Dressing and Laundry     Pt. Fully independent for transfers and gait and walked with: Walker    Pain   No    Cognition    A&O x4   Able to follow 2 step commands    Subjective  Patient lying supine in bed with no family present. Pt agreeable to this PT eval & tx. Upper Extremity ROM/Strength  Please see OT evaluation. Lower Extremity ROM / Strength   AROM WFL: Yes  ROM limitations: limited L dorsiflexion     Strength Assessment (measured on a 0-5 scale):  R LE   Quad   4   Ant Tib  4   Hamstring 4   Iliopsoas 4  L LE  Quad   3+   Ant Tib  1 (trace contraction)   Hamstring 3-   Iliopsoas 3-    Lower Extremity Sensation    WFL    Lower Extremity Proprioception:   Impaired on the LLE    Coordination and Tone  WFL    Balance  Sitting:  Fair +; SBA  Comments:     Standing: Fair ; CGA  Comments: 5 min    Bed Mobility   Supine to Sit:    Min A   Sit to Supine:   Not Tested  Rolling:   Not Tested  Scooting in sitting: CGA  Scooting in supine:  Not Tested    Transfer Training     Sit to stand:   CGA  Stand to sit:   CGA  Bed to Chair:   CGA with use of gait belt and rolling walker (RW)    Gait gait completed as indicated below  Distance:      40 ft  Deviations (firm surface/linoleum):  decreased thao, increased DOLORES, shuffles, step to pattern, decreased step length bilaterally, decreased stance time bilaterally and absent heel strike on the LLE with mild foot drop  Assistive Device Used:    gait belt and rolling walker (RW)  Level of Assist:    CGA  Comment:     Stair Training deferred, pt does not have stairs in home environment    Activity Tolerance   Pt completed therapy session with SOB noted with activity   BP: 168/79, 169/81 after walking   HR: 90bpm, 88bpm after walking  SpO2: 93%, 94% after walking    Positioning Needs   Pt up in chair, alarm set, positioned in proper neutral alignment and pressure relief provided.    Call light

## 2020-10-22 NOTE — PROGRESS NOTES
MD made aware of elevated BP this afternoon, via perfect serve. Pt asymptomatic for elevated BP. Per Dr. Josefina Gautam, no new orders.

## 2020-10-22 NOTE — PROGRESS NOTES
Pharmacy to Dose Warfarin     Dx: afib  Goal INR range 2-3   Home Warfarin dose:  7.5 mg on Tues, Thur, Sat     Date                 INR                  Warfarin  10-21              1.89                   no dose  10/22              1.93                   7.5mg     Recommend 7.5mg Warfarin tonight. Daily INR ordered. Rx will continue to manage therapy per Dr. Marietta Sims consult order.     Kristeen Claude PharmD Candidate [address] [place] [address] [facility] HAMM 10/22/99112:41 PM  .

## 2020-10-22 NOTE — CONSULTS
Pharmacy to Dose Warfarin    Dx: afib  Goal INR range 2-3   Home Warfarin dose:  7.5 mg on Tues, Thur, Sat    Date  INR  Warfarin  10-21              1.89                   no dose    Recommend no Warfarin tonight. Daily INR ordered. Rx will continue to manage therapy per Dr. Aubrey Avendano consult order. [address] [place] [address]. Ph.  10/22/2020 6:30 AM

## 2020-10-22 NOTE — ED NOTES
Morning meds sent from pharmacy and insulin given to floor nurse.       Carlee Avendano RN  10/22/20 5641

## 2020-10-22 NOTE — H&P
Hospital Medicine History & Physical      PCP: ANANT Garcia CNP    Date of Admission: 10/21/2020    Date of Service: Pt seen/examined on 10/21/20 and Admitted to Inpatient with expected LOS greater than two midnights due to medical therapy. Chief Complaint:  DIXON      History Of Present Illness:     79 y.o. female with 3 days worsening sob / dixon / fatigue / diarrhea. Her  was diagnosed with Covid19 3 weeks ago. She has had a cough productive of yellowish sputum, but has felt well until 3-4 days ago. She's noted progressive generalized weakness, non productive cough and sob/dixon. She denies chest pain, leg edema, fever, chills, change in taste or smell, or headache. Past Medical History:          Diagnosis Date    Atrial fibrillation ([address]     Cancer (HCC)     Cerebral artery occlusion with cerebral infarction ([address]     left sided weakness-left arm and left leg, also left eye    Diabetes mellitus ([address]     Fractures     Hypertension     Thyroid disease        Past Surgical History:          Procedure Laterality Date    ANKLE FRACTURE SURGERY  2019    left ankle    ANKLE SURGERY Left 6/17/2014    Removal painful hardware    CHOLECYSTECTOMY      CYST REMOVAL      chest x2, back x1    HAND SURGERY      right    HAND SURGERY  2/21/12    DUPUYTRENS RELEASE LEFT MIDDLE AND RING FINGERS INCLUDING    HYSTERECTOMY      THYROIDECTOMY         Medications Prior to Admission:      Prior to Admission medications    Medication Sig Start Date End Date Taking?  Authorizing Provider   pantoprazole (PROTONIX) 40 MG tablet Take 1 tablet by mouth daily 10/1/20   Vasquez Haddox, DO   b complex vitamins capsule Take 1 capsule by mouth daily    Historical Provider, MD   nitroGLYCERIN (NITROSTAT) 0.4 MG SL tablet Place 1 tablet under the tongue every 5 minutes as needed for Chest pain 9/25/20   ANANT Kim CNP   Continuous Blood Gluc Sensor (FREESTYLE ASHELY 14 DAY SENSOR) MISC 2 Units by Does not apply route every 14 days 6/26/20   ANANT Chavez CNP   Insulin Syringe-Needle U-100 (INSULIN SYRINGE 1CC/30GX5/16\") 30G X 5/16\" 1 ML MISC 1 each by Does not apply route 3 times daily 5/19/20   ANANT Chavez CNP   amLODIPine (NORVASC) 2.5 MG tablet Take 1 tablet by mouth daily 3/10/20   ANANT Lima CNP   warfarin (COUMADIN) 7.5 MG tablet Take 7.5 mg by mouth [facility]    Historical Provider, MD   insulin regular (HUMULIN R;NOVOLIN R) 100 UNIT/ML injection Inject 44-46 Units into the skin Daily with supper    Historical Provider, MD   insulin 70-30 (HUMULIN 70/30) (70-30) 100 UNIT per ML injection vial Inject 44-46 Units into the skin 2 times daily Morning and bedtime    Historical Provider, MD   nystatin (MYCOSTATIN) 831228 UNIT/GM cream  2/10/20   Historical Provider, MD   levothyroxine (SYNTHROID) 112 MCG tablet Take 112 mcg by mouth Daily  2/26/20   Historical Provider, MD   rosuvastatin (CRESTOR) 10 MG tablet Take 1 tablet by mouth daily 7/25/18   ANANT Arguello CNP   lisinopril (PRINIVIL;ZESTRIL) 40 MG tablet Take 40 mg by mouth daily    Historical Provider, MD   magnesium (MAGNESIUM-OXIDE) 250 MG TABS tablet Take 250 mg by mouth daily 3x/week    Historical Provider, MD   Omega-3 Fatty Acids (FISH OIL) 1200 MG CAPS Take 1 capsule by mouth 2 times daily     Historical Provider, MD   vitamin B-12 (CYANOCOBALAMIN) 1000 MCG tablet Take 1,000 mcg by mouth daily 3x/week    Historical Provider, MD       Allergies:  Cephalexin; Contrast [iodides]; and Barium-containing compounds    Social History:           TOBACCO:   reports that she has never smoked. She has never used smokeless tobacco.  ETOH:   reports no history of alcohol use.       Family History:               Problem Relation Age of Onset   [facility] Cancer Mother     Diabetes Mother     Cancer Brother     High Blood Pressure Brother     Depression Maternal Aunt     Heart Disease Brother     Diabetes Brother    [facility] High Blood Pressure Brother     Heart Disease Brother     Diabetes Brother     High Blood Pressure Brother        REVIEW OF SYSTEMS:   Pertinent positives as noted in the HPI. All other systems reviewed and negative. PHYSICAL EXAM PERFORMED:    BP (!) 163/79   Pulse 92   Temp 99 °F (37.2 °C)   Resp 25   SpO2 90%     General appearance:  No apparent distress, appears stated age and cooperative. HEENT:  Normal cephalic, atraumatic without obvious deformity. Pupils equal, round, and reactive to light. Extra ocular muscles intact. Conjunctivae/corneas clear. Neck: Supple, with full range of motion. No jugular venous distention. Trachea midline. Respiratory:  Normal respiratory effort. Equal excursion bilaterally, no intercostal retractions, use of scm  Cardiovascular:  Regular rate and rhythm    Abdomen: Soft, non-tender, non-distended with normal bowel sounds. Musculoskeletal:  No clubbing, cyanosis or edema bilaterally. Full range of motion without deformity. Skin: Skin color, texture, turgor normal.  No rashes or lesions. Neurologic:  Neurovascularly intact without any focal sensory/motor deficits.  Cranial nerves: II-XII intact, grossly non-focal.  Psychiatric:  Alert and oriented, thought content appropriate, normal insight  Capillary Refill: Brisk,< 3 seconds   Peripheral Pulses: +2 palpable, equal bilaterally       Labs:     Recent Labs     10/21/20  1510   WBC 4.2   HGB 13.7   HCT 41.0   *     Recent Labs     10/21/20  1510   *   K 3.9   CL 98*   CO2 26   BUN 15   CREATININE 0.8   CALCIUM 8.4     Recent Labs     10/21/20  1510   AST 25   ALT 24   BILITOT 0.3   ALKPHOS 68     Recent Labs     10/21/20  1510   INR 1.89*     Recent Labs     10/21/20  1510   TROPONINI <0.01       Urinalysis:      Lab Results   Component Value Date    NITRU Negative 01/08/2019    WBCUA 3-5 07/15/2018    BACTERIA 1+ 04/30/2017    RBCUA 0-2 07/15/2018    BLOODU Negative 01/08/2019    SPECGRAV 1.025 01/08/2019    GLUCOSEU >=1000 01/08/2019       Radiology:        XR CHEST PORTABLE   Final Result   Hypoinflated lungs with patchy perihilar and bibasilar opacities which may be   on the basis of edema or infection in the appropriate clinical setting. Probable trace right pleural effusion. ASSESSMENT:    There are no active hospital problems to display for this patient. PLAN:    1) Suspect Covid PNA  - afebrile, currently stable on room air  - decadron started  - remdesivir/plasma if clinically indicated  - had outpatient covid test, results expected ths Friday  - continue levaquin for suspected cap    2) DM  - corrective ISS    3) HTN  - continue lisinopril    DVT Prophylaxis: lovenox  Diet: No diet orders on file  Code Status: Prior         Dispo - Pavel Vincent MD    Thank you ANANT Merrill CNP for the opportunity to be involved in this patient's care. If you have any questions or concerns please feel free to contact me at [phone].

## 2020-10-22 NOTE — PROGRESS NOTES
Progress Note    Admit Date:  10/21/2020    Admitted for suspected COVID-19 PNA     Subjective:  Ms. Chanel Conti feels ok today . Minimal cough, remains on RA  Hx of cva with left sided weakness    Objective:   Patient Vitals for the past 4 hrs:   BP Temp Temp src Pulse Resp SpO2   10/22/20 0745 (!) 168/78 96.7 °F (35.9 °C) Oral 78 18 93 %   10/22/20 0706 (!) 157/74 -- -- 76 23 91 %       Intake/Output Summary (Last 24 hours) at 10/22/2020 1057  Last data filed at 10/22/2020 0050  Gross per 24 hour   Intake 100 ml   Output --   Net 100 ml       Physical Exam:        General:  eldelry female, Awake, alert and oriented. Appears to be not in any distress  Mucous Membranes:  Pink , anicteric  Neck: No JVD, no carotid bruit, no thyromegaly  Chest:  Clear to auscultation bilaterally, minimal basilar crackles   Cardiovascular:  RRR S1S2 heard, no murmurs or gallops  Abdomen:  Soft, undistended, non tender, no organomegaly, BS present  Extremities: No edema or cyanosis.  Distal pulses well felt  Neurological : chronic left UE and LE weakness  grossly normal        Scheduled Meds:   warfarin (COUMADIN) daily dosing (placeholder)   Other RX Placeholder    dexamethasone  6 mg Intravenous Q24H    levofloxacin  500 mg Intravenous Q24H    amLODIPine  2.5 mg Oral Daily    vitamin B complex w/C  1 tablet Oral Daily    levothyroxine  112 mcg Oral Daily    lisinopril  40 mg Oral Daily    magnesium oxide  200 mg Oral Daily    pantoprazole  40 mg Oral Daily    rosuvastatin  10 mg Oral Daily    vitamin B-12  1,000 mcg Oral Daily    sodium chloride flush  10 mL Intravenous 2 times per day    insulin lispro  0-6 Units Subcutaneous TID WC    insulin lispro  0-3 Units Subcutaneous Nightly       Continuous Infusions:   dextrose         PRN Meds:  prochlorperazine, nitroGLYCERIN, sodium chloride flush, acetaminophen **OR** acetaminophen, polyethylene glycol, glucose, dextrose, glucagon (rDNA), dextrose      Data:  CBC:   Recent Labs 10/21/20  1510   WBC 4.2   HGB 13.7   HCT 41.0   MCV 88.4   *     BMP:   Recent Labs     10/21/20  1510   *   K 3.9   CL 98*   CO2 26   BUN 15   CREATININE 0.8     LIVER PROFILE:   Recent Labs     10/21/20  1510   AST 25   ALT 24   BILITOT 0.3   ALKPHOS 68     PT/INR:   Recent Labs     10/21/20  1510 10/22/20  0651   PROTIME 22.1* 22.5*   INR 1.89* 1.93*       CULTURES  None      RADIOLOGY  XR CHEST PORTABLE   Final Result   Hypoinflated lungs with patchy perihilar and bibasilar opacities which may be   on the basis of edema or infection in the appropriate clinical setting. Probable trace right pleural effusion.              Assessment/Plan:    COVID-19 infection   - testing done at PCP office showed positive test    - No acute hypoxia   - Continue Decadron   - CXR with bibasilar opacities   - DROPLET + precautions   - procal neg     Thrombocytopenia   - acute, 132  - baseline ~180   - Likely 2/2 acute infection   - No acute bleeding     DM2  - BG is elevated   - Humulin R at home - resume   - Continue SSI      Hypothyroidism  - Continue Synthroid     HTN  - BP is uncontrolled   - Continue lisinopril     Atrial fibrillation   - AC on Coumadin   -Not on rate controllers     HLD  - Continue statin     DVT Prophylaxis: Coumadin   Diet: DIET CARB CONTROL;  Code Status: Full Code    Nayan Longoria MD 10/22/2020 8:05 PM

## 2020-10-22 NOTE — ED NOTES
Pt transported to 2W via stretcher by ER nurse, with COVID precautions. Report given to GOLDEN Bueno.       Denice Leyden, RN  10/22/20 5698

## 2020-10-22 NOTE — CARE COORDINATION
Case Management Assessment  Initial Evaluation      Patient Name: Noelle Briggs  YOB: 1940  Diagnosis: Pneumonia [J18.9]  Date / Time: 10/21/2020  2:26 PM    Admission status/Date:inpt  Chart Reviewed: Yes      Patient Interviewed: No   Family Interviewed:  Yes - spouse      Hospitalization in the last 30 days:  No      Health Care Decision Maker : (1st enter selection under Navigator - emergency contact- Basilia 8 Relationship)   Who do you trust or have selected to make healthcare decisions for you      Met with: writer spoke with the pt's spouse via TC   Interview conducted  (bedside/phone):    Current PCP:   ANANT Klein CNP      Financial  Commercial  Precert required for SNF : Y, N          3 night stay required - [employer]  Support Systems/Care Needs: Spouse/Significant Other, Children  Transportation: family    Meal Preparation: family    Housing  Living Arrangements: home with spouse  Steps: ramp  Intent for return to present living arrangements: Yes  Identified Issues: no    Home Care Information  Active with [address] [place] [address] [place] [address] : No Agency:(Services)     Passport/Waiver : No  :                      Phone Number:    Passport/Waiver Services: no          Durable Medical Equiptment   DME Provider:   Equipment:   Walker__x_Cane___RTS___ BSC___Shower Chair___Hospital Bed___W/C____Other________  02 at ____Liter(s)---wears(frequency)_______ HHN ___ CPAP___ BiPap___   N/A_scooter___      Home O2 Use :  No    If No for home O2---if presently on O2 during hospitalization:  No  if yes CM to follow for potential DC O2 need  Informed of need for care provider to bring portable home O2 tank on day of discharge for nursing to connect prior to leaving:   Not Indicated  Verbalized agreement/Understanding:   Not Indicated    Community Service Affiliation  Dialysis:  No    · Agency:  · Location:  · Dialysis Schedule:  · Phone:   · Fax:     Other Community Services:

## 2020-10-22 NOTE — ED PROVIDER NOTES
diagnostic, treatment, and disposition decisions were made by myself in conjunction with the advanced practice provider. For all further details of the patient's emergency department visit, please see the advanced practice provider's documentation. Comment: Please note this report has been produced using speech recognition software and may contain errors related to that system including errors in grammar, punctuation, and spelling, as well as words and phrases that may be inappropriate. If there are any questions or concerns please feel free to contact the dictating provider for clarification.           Sayra SorensenEncompass Health Lakeshore Rehabilitation Hospitaltru  10/22/20 7262

## 2020-10-23 LAB
BASOPHILS ABSOLUTE: 0 K/UL (ref 0–0.2)
BASOPHILS RELATIVE PERCENT: 0.2 %
EOSINOPHILS ABSOLUTE: 0 K/UL (ref 0–0.6)
EOSINOPHILS RELATIVE PERCENT: 0 %
ESTIMATED AVERAGE GLUCOSE: 217.3 MG/DL
GLUCOSE BLD-MCNC: 167 MG/DL (ref 70–99)
GLUCOSE BLD-MCNC: 211 MG/DL (ref 70–99)
GLUCOSE BLD-MCNC: 342 MG/DL (ref 70–99)
GLUCOSE BLD-MCNC: 358 MG/DL (ref 70–99)
HBA1C MFR BLD: 9.2 %
HCT VFR BLD CALC: 41.8 % (ref 36–48)
HEMOGLOBIN: 13.9 G/DL (ref 12–16)
INR BLD: 2.15 (ref 0.86–1.14)
LYMPHOCYTES ABSOLUTE: 0.7 K/UL (ref 1–5.1)
LYMPHOCYTES RELATIVE PERCENT: 11.3 %
MCH RBC QN AUTO: 29.8 PG (ref 26–34)
MCHC RBC AUTO-ENTMCNC: 33.3 G/DL (ref 31–36)
MCV RBC AUTO: 89.6 FL (ref 80–100)
MONOCYTES ABSOLUTE: 0.5 K/UL (ref 0–1.3)
MONOCYTES RELATIVE PERCENT: 8.5 %
NEUTROPHILS ABSOLUTE: 4.8 K/UL (ref 1.7–7.7)
NEUTROPHILS RELATIVE PERCENT: 80 %
PDW BLD-RTO: 13.6 % (ref 12.4–15.4)
PERFORMED ON: ABNORMAL
PLATELET # BLD: 140 K/UL (ref 135–450)
PMV BLD AUTO: 9.3 FL (ref 5–10.5)
PROTHROMBIN TIME: 25.1 SEC (ref 10–13.2)
RBC # BLD: 4.66 M/UL (ref 4–5.2)
WBC # BLD: 6 K/UL (ref 4–11)

## 2020-10-23 PROCEDURE — 6360000002 HC RX W HCPCS: Performed by: INTERNAL MEDICINE

## 2020-10-23 PROCEDURE — 97110 THERAPEUTIC EXERCISES: CPT

## 2020-10-23 PROCEDURE — 85025 COMPLETE CBC W/AUTO DIFF WBC: CPT

## 2020-10-23 PROCEDURE — 2580000003 HC RX 258: Performed by: HOSPITALIST

## 2020-10-23 PROCEDURE — 1200000000 HC SEMI PRIVATE

## 2020-10-23 PROCEDURE — 99232 SBSQ HOSP IP/OBS MODERATE 35: CPT | Performed by: INTERNAL MEDICINE

## 2020-10-23 PROCEDURE — 6370000000 HC RX 637 (ALT 250 FOR IP): Performed by: HOSPITALIST

## 2020-10-23 PROCEDURE — 97530 THERAPEUTIC ACTIVITIES: CPT

## 2020-10-23 PROCEDURE — 85610 PROTHROMBIN TIME: CPT

## 2020-10-23 PROCEDURE — 6370000000 HC RX 637 (ALT 250 FOR IP): Performed by: INTERNAL MEDICINE

## 2020-10-23 PROCEDURE — 6360000002 HC RX W HCPCS: Performed by: HOSPITALIST

## 2020-10-23 PROCEDURE — 36415 COLL VENOUS BLD VENIPUNCTURE: CPT

## 2020-10-23 RX ORDER — AMLODIPINE BESYLATE 5 MG/1
5 TABLET ORAL DAILY
Status: DISCONTINUED | OUTPATIENT
Start: 2020-10-24 | End: 2020-10-27 | Stop reason: HOSPADM

## 2020-10-23 RX ORDER — INSULIN GLARGINE 100 [IU]/ML
10 INJECTION, SOLUTION SUBCUTANEOUS ONCE
Status: COMPLETED | OUTPATIENT
Start: 2020-10-23 | End: 2020-10-23

## 2020-10-23 RX ORDER — GUAIFENESIN/DEXTROMETHORPHAN 100-10MG/5
5 SYRUP ORAL EVERY 6 HOURS PRN
Status: DISCONTINUED | OUTPATIENT
Start: 2020-10-23 | End: 2020-10-27 | Stop reason: HOSPADM

## 2020-10-23 RX ORDER — AMLODIPINE BESYLATE 2.5 MG/1
2.5 TABLET ORAL ONCE
Status: COMPLETED | OUTPATIENT
Start: 2020-10-23 | End: 2020-10-23

## 2020-10-23 RX ORDER — WARFARIN SODIUM 7.5 MG/1
7.5 TABLET ORAL
Status: COMPLETED | OUTPATIENT
Start: 2020-10-23 | End: 2020-10-23

## 2020-10-23 RX ADMIN — INSULIN GLARGINE 10 UNITS: 100 INJECTION, SOLUTION SUBCUTANEOUS at 17:30

## 2020-10-23 RX ADMIN — WARFARIN 7.5 MG: 7.5 TABLET ORAL at 17:29

## 2020-10-23 RX ADMIN — INSULIN LISPRO 1 UNITS: 100 INJECTION, SOLUTION INTRAVENOUS; SUBCUTANEOUS at 21:09

## 2020-10-23 RX ADMIN — ACETAMINOPHEN 650 MG: 325 TABLET ORAL at 00:32

## 2020-10-23 RX ADMIN — LISINOPRIL 40 MG: 20 TABLET ORAL at 08:03

## 2020-10-23 RX ADMIN — AMLODIPINE BESYLATE 2.5 MG: 5 TABLET ORAL at 08:06

## 2020-10-23 RX ADMIN — INSULIN LISPRO 8 UNITS: 100 INJECTION, SOLUTION INTRAVENOUS; SUBCUTANEOUS at 12:14

## 2020-10-23 RX ADMIN — CYANOCOBALAMIN TAB 500 MCG 1000 MCG: 500 TAB at 08:06

## 2020-10-23 RX ADMIN — MAGNESIUM GLUCONATE 500 MG ORAL TABLET 200 MG: 500 TABLET ORAL at 08:05

## 2020-10-23 RX ADMIN — LEVOTHYROXINE SODIUM 112 MCG: 112 TABLET ORAL at 06:39

## 2020-10-23 RX ADMIN — PANTOPRAZOLE SODIUM 40 MG: 40 TABLET, DELAYED RELEASE ORAL at 08:04

## 2020-10-23 RX ADMIN — GUAIFENESIN AND DEXTROMETHORPHAN 5 ML: 100; 10 SYRUP ORAL at 12:07

## 2020-10-23 RX ADMIN — DEXAMETHASONE SODIUM PHOSPHATE 6 MG: 10 INJECTION INTRAMUSCULAR; INTRAVENOUS at 17:09

## 2020-10-23 RX ADMIN — Medication 10 ML: at 09:00

## 2020-10-23 RX ADMIN — PROCHLORPERAZINE EDISYLATE 10 MG: 5 INJECTION INTRAMUSCULAR; INTRAVENOUS at 17:17

## 2020-10-23 RX ADMIN — POTASSIUM CHLORIDE AND SODIUM CHLORIDE: 900; 150 INJECTION, SOLUTION INTRAVENOUS at 10:37

## 2020-10-23 RX ADMIN — B-COMPLEX W/ C & FOLIC ACID TAB 1 TABLET: TAB at 08:03

## 2020-10-23 RX ADMIN — ROSUVASTATIN CALCIUM 10 MG: 10 TABLET, FILM COATED ORAL at 08:04

## 2020-10-23 RX ADMIN — INSULIN LISPRO 4 UNITS: 100 INJECTION, SOLUTION INTRAVENOUS; SUBCUTANEOUS at 17:20

## 2020-10-23 RX ADMIN — POTASSIUM CHLORIDE AND SODIUM CHLORIDE: 900; 150 INJECTION, SOLUTION INTRAVENOUS at 21:10

## 2020-10-23 RX ADMIN — AMLODIPINE BESYLATE 2.5 MG: 2.5 TABLET ORAL at 10:37

## 2020-10-23 RX ADMIN — ACETAMINOPHEN 650 MG: 325 TABLET ORAL at 15:07

## 2020-10-23 ASSESSMENT — PAIN SCALES - GENERAL
PAINLEVEL_OUTOF10: 5
PAINLEVEL_OUTOF10: 2
PAINLEVEL_OUTOF10: 0

## 2020-10-23 NOTE — FLOWSHEET NOTE
10/23/20 0751   Vital Signs   Temp 98 °F (36.7 °C)   Temp Source Oral   Pulse 79   Heart Rate Source Monitor   Resp 18   BP (!) 200/90  (notified rn Edgar)   BP Location Right upper arm   BP Upper/Lower Upper   Patient Position Semi fowlers   Level of Consciousness 0   MEWS Score 3   Oxygen Therapy   SpO2 93 %   O2 Device None (Room air)   AM assessment completed, see flow sheet. Pt is alert and oriented. BP elevated, rechecked at 8am, 178/82. Respirations are even & easy. No complaints voiced. Pt denies needs at this time. SR up x 2, and bed in low position. Call light is within reach.

## 2020-10-23 NOTE — PLAN OF CARE
Problem: Falls - Risk of:  Goal: Will remain free from falls  Description: Will remain free from falls  10/23/2020 0354 by Hayley Elder RN  Outcome: Ongoing  10/22/2020 1402 by Jairo Bishop RN  Outcome: Ongoing  Goal: Absence of physical injury  Description: Absence of physical injury  10/23/2020 0354 by Hayley Elder RN  Outcome: Ongoing  10/22/2020 1402 by Jairo Bishop RN  Outcome: Ongoing     Problem: Airway Clearance - Ineffective  Goal: Achieve or maintain patent airway  Outcome: Ongoing     Problem: Gas Exchange - Impaired  Goal: Absence of hypoxia  Outcome: Ongoing  Goal: Promote optimal lung function  Outcome: Ongoing     Problem: Breathing Pattern - Ineffective  Goal: Ability to achieve and maintain a regular respiratory rate  Outcome: Ongoing     Problem:  Body Temperature -  Risk of, Imbalanced  Goal: Ability to maintain a body temperature within defined limits  Outcome: Ongoing  Goal: Will regain or maintain usual level of consciousness  Outcome: Ongoing  Goal: Complications related to the disease process, condition or treatment will be avoided or minimized  Outcome: Ongoing     Problem: Isolation Precautions - Risk of Spread of Infection  Goal: Prevent transmission of infection  Outcome: Ongoing     Problem: Nutrition Deficits  Goal: Optimize nutrtional status  Outcome: Ongoing     Problem: Risk for Fluid Volume Deficit  Goal: Maintain normal heart rhythm  Outcome: Ongoing  Goal: Maintain absence of muscle cramping  Outcome: Ongoing  Goal: Maintain normal serum potassium, sodium, calcium, phosphorus, and pH  Outcome: Ongoing     Problem: Loneliness or Risk for Loneliness  Goal: Demonstrate positive use of time alone when socialization is not possible  Outcome: Ongoing     Problem: Fatigue  Goal: Verbalize increase energy and improved vitality  Outcome: Ongoing     Problem: Patient Education: Go to Patient Education Activity  Goal: Patient/Family Education  Outcome: Ongoing

## 2020-10-23 NOTE — PROGRESS NOTES
Pharmacy to Dose Warfarin     Dx: afib  Goal INR range 2-3   Home Warfarin dose:  7.5 mg on Tues, Thur, Sat     Date                 INR                  Warfarin  10-21              1.89                   no dose  10/22              1.93                   7.5mg  10/23              2.15                  7.5mg     Recommend 7.5mg Warfarin tonight. Daily INR ordered. Rx will continue to manage therapy per Dr. Aubrey Glaser consult order.      Ed Zaragoza PharmD Candidate [address] [place] [address] Shirley HAMM 10/23/439088:22 PM  .

## 2020-10-23 NOTE — PROGRESS NOTES
Physical Therapy  Inpatient Physical Therapy Daily Treatment Note    Unit: [facility]  Date:  10/23/2020  Patient Name:    Maryan Hayes  Admitting diagnosis:  Pneumonia [J18.9]  Admit Date:  10/21/2020  Precautions/Restrictions:  Fall risk, Bed/chair alarm, Lines -IV and Isolation Precautions: Droplet Plus - COVID      Discharge Recommendations: SNF  DME needs for discharge: defer to facility       Therapy recommendation for EMS Transport: can transport by wheelchair    Therapy recommendations for staff:   Assist of 1 with use of rolling walker (RW) and gait belt for all transfers and ambulation within room. History of Present Illness: (H & P taken from Brenda Jason MD's note dated 10/21/2020)  80 y.o. female with 3 days worsening sob / coker / fatigue / diarrhea. Her  was diagnosed with Covid19 3 weeks ago. She has had a cough productive of yellowish sputum, but has felt well until 3-4 days ago. She's noted progressive generalized weakness, non productive cough and sob/coker. She denies chest pain, leg edema, fever, chills, change in taste or smell, or headache. Home Health S4 Level Recommendation: Level 3 Safety  AM-PAC Mobility Score      AM-PAC Inpatient Mobility without Stair Climbing Raw Score : 15    Treatment Time:  7210-1749  Treatment number: 2  Timed Code Treatment Minutes: 40 minutes  Total Treatment Minutes:  40 minutes    Cognition    A&O x4 . Pt initially presented with confusion but became more oriented with course of treatment. Able to follow 1 step commands    Subjective  Patient lying supine in bed with no family present   Pt agreeable to this PT tx.      Pain   No  Location: n/a  Rating:    NA/10  Pain Medicine Status: No request made     Bed Mobility   Supine to Sit:    Min A  and 2 persons  Sit to Supine:   Not Tested  Rolling:   Not Tested  Scooting:   Not Tested    Transfer Training     Sit to stand:   Min A  from [facility] and from [facility] [address] [facility] to sit:   CGA  Bed to Chair:   Not Tested with use of N/A   Bed to/from [facility]:       Min A without AD via stand pivot transfer    Gait Training gait completed as indicated below  Distance:      15 ft  Deviations (firm surface/linoleum):  decreased thao, forward flexed posture, decreased step length bilat (R>L) and poor LLE advancement due to 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 Pt unable to dorsiflex to achieve L foot clearance. Pt stands with trunk/pelvic rotation to L side to enable L leg advancement with adduction to compensate for hip flexion weakness. LLE remains outside DOLORES of walker almost at all times. Assistive Device Used:    rolling walker (RW)  Level of Assist:    CGA  Comment: none. Stair Training deferred, pt unsafe/not appropriate to complete stairs at this time  # of Steps:   N/A  Level of Assist:  Not Tested  UE Support:  NA  Assistive Device:  N/A  Pattern:   N/A  Comments: n/a    Therapeutic Exercise all completed bilaterally unless indicated  Ankle Pumps x 10 reps  LAQ x 10 reps  marching x 10 reps  *note pt unable to dorsiflex L ankle, and reduced ROM hip flexion from seated position. Balance  Sitting (static):  Fair  progressing to Fair+; Min A progressing to SBA. Comments: Pt initially required Min A to correct for right lateral lean while sitting EOB, but pt able to assume improved posture after a few minutes. Pt sat at [facility] with SBA. Standing: Fair +; CGA  Comments: Pt stands with decreased weight bearing on LLE due to baseline weakness. Patient Education      Role of PT, POC, Discharge recommendations, safety awareness, transfer techniques, pursed lip breathing and calling for assist with mobility. Positioning Needs       Pt up in chair, alarm set, positioned in proper neutral alignment and pressure relief provided. Call light provided and all needs within reach     Activity Tolerance   Pt completed therapy session with SOB noted with activity. After transferring BSC>EOB, SpO2 90% on room air while seated EOB. HR 91. /86. After ambulating 15' to chair, SpO2 90% on room air. Other  None. Assessment :  Patient initially presented with moderate confusion but orientation improved over course of treatment. Pt displayed signs of moderate fatigue and SpO2 ~90% with all therapy activity. Pt required Min A-CGA for transfers and ambulated with RW 15' at [facility]. Pt ambulates with significant gait deviations which are likely present at baseline. Recommending SNF upon discharge as patient functioning well below baseline, demonstrates good rehab potential and unable to return home due to burden of care beyond caregiver ability, home environment not conducive to patient recovery and limited safety awareness. Goals (all goals ongoing unless otherwise indicated)  To be met in 3 visits:  1). Independent with LE Ex x 10 reps     To be met in 6 visits:  1). Supine to/from sit: SBA  2). Sit to/from stand: SBA  3). Bed to chair: SBA  4). Gait: Ambulate 150 ft.  with  SBA and use of LRAD (least restrictive assistive device)  5). Tolerate B LE exercises 3 sets of 10-15 reps    Plan   Continue with plan of care. Signature: Damien Villanueva, PT, DPT    If patient discharges from this facility prior to next visit, this note will serve as the Discharge Summary.

## 2020-10-23 NOTE — PROGRESS NOTES
Progress Note    Admit Date:  10/21/2020    Admitted for suspected COVID-19 PNA     Subjective:  Ms. Isa Perez felt fine until 2 hrs ago, now having nausea, feels sicker than yesterday   Low grade fevers recurring, off oxygen though      Objective:   Patient Vitals for the past 4 hrs:   BP Temp Temp src Pulse Resp SpO2   10/23/20 0751 (!) 200/90 98 °F (36.7 °C) Oral 79 18 93 %   10/23/20 0415 (!) 152/81 98.9 °F (37.2 °C) Oral 92 18 94 %       Intake/Output Summary (Last 24 hours) at 10/23/2020 0754  Last data filed at 10/22/2020 1814  Gross per 24 hour   Intake 480 ml   Output --   Net 480 ml       Physical Exam:        General:  eldelry female, Awake, alert and oriented. Appears to be not in any distress  Mucous Membranes:  Pink , anicteric  Neck: No JVD, no carotid bruit, no thyromegaly  Chest:  Clear to auscultation bilaterally, minimal basilar crackles   Cardiovascular:  RRR S1S2 heard, no murmurs or gallops  Abdomen:  Soft, undistended, non tender, no organomegaly, BS present  Extremities: No edema or cyanosis.  Distal pulses well felt  Neurological : chronic left UE and LE weakness  grossly normal        Scheduled Meds:   warfarin (COUMADIN) daily dosing (placeholder)   Other RX Placeholder    insulin lispro protamine & lispro  35 Units Subcutaneous BID WC    dexamethasone  6 mg Intravenous Q24H    amLODIPine  2.5 mg Oral Daily    vitamin B complex w/C  1 tablet Oral Daily    levothyroxine  112 mcg Oral Daily    lisinopril  40 mg Oral Daily    magnesium oxide  200 mg Oral Daily    pantoprazole  40 mg Oral Daily    rosuvastatin  10 mg Oral Daily    vitamin B-12  1,000 mcg Oral Daily    sodium chloride flush  10 mL Intravenous 2 times per day    insulin lispro  0-6 Units Subcutaneous TID WC    insulin lispro  0-3 Units Subcutaneous Nightly       Continuous Infusions:   0.9% NaCl with KCl 20 mEq 100 mL/hr at 10/22/20 1420    dextrose         PRN Meds:  prochlorperazine, nitroGLYCERIN, sodium chloride flush, acetaminophen **OR** acetaminophen, polyethylene glycol, glucose, dextrose, glucagon (rDNA), dextrose      Data:  CBC:   Recent Labs     10/21/20  1510 10/23/20  0541   WBC 4.2 6.0   HGB 13.7 13.9   HCT 41.0 41.8   MCV 88.4 89.6   * 140     BMP:   Recent Labs     10/21/20  1510   *   K 3.9   CL 98*   CO2 26   BUN 15   CREATININE 0.8     LIVER PROFILE:   Recent Labs     10/21/20  1510   AST 25   ALT 24   BILITOT 0.3   ALKPHOS 68     PT/INR:   Recent Labs     10/21/20  1510 10/22/20  0651 10/23/20  0541   PROTIME 22.1* 22.5* 25.1*   INR 1.89* 1.93* 2.15*       CULTURES  None      RADIOLOGY  XR CHEST PORTABLE   Final Result   Hypoinflated lungs with patchy perihilar and bibasilar opacities which may be   on the basis of edema or infection in the appropriate clinical setting. Probable trace right pleural effusion.              Assessment/Plan:    COVID-19 infection   - testing done at PCP office showed positive test    - No acute hypoxia   - Continue Decadron   - CXR with bibasilar opacities   - DROPLET + precautions   - procal neg     Thrombocytopenia   - acute, 132  - baseline ~180   - Likely 2/2 acute infection   - No acute bleeding     DM2  - BG is elevated   - Humulin R at home - resumed and increase dose   - Continue SSI      Hypothyroidism  - Continue Synthroid     HTN  - BP is uncontrolled   - Continue lisinopril , increase home norvasc      Atrial fibrillation   - AC on Coumadin   -Not on rate controllers     HLD  - Continue statin     DVT Prophylaxis: Coumadin   Diet: DIET CARB CONTROL;  Code Status: Full Code     Monitor one more day in house  Likely dc with homecare    Lyudmila Walls MD 10/23/2020 7:54 AM

## 2020-10-24 ENCOUNTER — APPOINTMENT (OUTPATIENT)
Dept: GENERAL RADIOLOGY | Age: 80
DRG: 177 | End: 2020-10-24
Payer: MEDICARE

## 2020-10-24 LAB
BACTERIA: ABNORMAL /HPF
BASOPHILS ABSOLUTE: 0 K/UL (ref 0–0.2)
BASOPHILS RELATIVE PERCENT: 0.2 %
BILIRUBIN URINE: NEGATIVE
BLOOD, URINE: ABNORMAL
CLARITY: CLEAR
COLOR: YELLOW
EOSINOPHILS ABSOLUTE: 0 K/UL (ref 0–0.6)
EOSINOPHILS RELATIVE PERCENT: 0 %
EPITHELIAL CELLS, UA: ABNORMAL /HPF (ref 0–5)
GLUCOSE BLD-MCNC: 193 MG/DL (ref 70–99)
GLUCOSE BLD-MCNC: 223 MG/DL (ref 70–99)
GLUCOSE BLD-MCNC: 252 MG/DL (ref 70–99)
GLUCOSE BLD-MCNC: 265 MG/DL (ref 70–99)
GLUCOSE BLD-MCNC: 274 MG/DL (ref 70–99)
GLUCOSE URINE: >=1000 MG/DL
HCT VFR BLD CALC: 38.6 % (ref 36–48)
HEMOGLOBIN: 12.7 G/DL (ref 12–16)
INR BLD: 3.33 (ref 0.86–1.14)
KETONES, URINE: ABNORMAL MG/DL
LACTIC ACID, SEPSIS: 1.1 MMOL/L (ref 0.4–1.9)
LEUKOCYTE ESTERASE, URINE: NEGATIVE
LYMPHOCYTES ABSOLUTE: 0.6 K/UL (ref 1–5.1)
LYMPHOCYTES RELATIVE PERCENT: 14 %
MCH RBC QN AUTO: 29.8 PG (ref 26–34)
MCHC RBC AUTO-ENTMCNC: 33.1 G/DL (ref 31–36)
MCV RBC AUTO: 90 FL (ref 80–100)
MICROSCOPIC EXAMINATION: YES
MONOCYTES ABSOLUTE: 0.4 K/UL (ref 0–1.3)
MONOCYTES RELATIVE PERCENT: 9.5 %
NEUTROPHILS ABSOLUTE: 3.3 K/UL (ref 1.7–7.7)
NEUTROPHILS RELATIVE PERCENT: 76.3 %
NITRITE, URINE: NEGATIVE
PDW BLD-RTO: 13.7 % (ref 12.4–15.4)
PERFORMED ON: ABNORMAL
PH UA: 5 (ref 5–8)
PLATELET # BLD: 125 K/UL (ref 135–450)
PMV BLD AUTO: 10 FL (ref 5–10.5)
PROTEIN UA: 100 MG/DL
PROTHROMBIN TIME: 39.1 SEC (ref 10–13.2)
RBC # BLD: 4.29 M/UL (ref 4–5.2)
RBC UA: ABNORMAL /HPF (ref 0–4)
SPECIFIC GRAVITY UA: >=1.03 (ref 1–1.03)
TROPONIN: <0.01 NG/ML
URINE TYPE: ABNORMAL
UROBILINOGEN, URINE: 0.2 E.U./DL
WBC # BLD: 4.3 K/UL (ref 4–11)
WBC UA: ABNORMAL /HPF (ref 0–5)

## 2020-10-24 PROCEDURE — 97116 GAIT TRAINING THERAPY: CPT

## 2020-10-24 PROCEDURE — 93005 ELECTROCARDIOGRAM TRACING: CPT | Performed by: INTERNAL MEDICINE

## 2020-10-24 PROCEDURE — 87086 URINE CULTURE/COLONY COUNT: CPT

## 2020-10-24 PROCEDURE — 85025 COMPLETE CBC W/AUTO DIFF WBC: CPT

## 2020-10-24 PROCEDURE — 6370000000 HC RX 637 (ALT 250 FOR IP): Performed by: INTERNAL MEDICINE

## 2020-10-24 PROCEDURE — 87150 DNA/RNA AMPLIFIED PROBE: CPT

## 2020-10-24 PROCEDURE — 2700000000 HC OXYGEN THERAPY PER DAY

## 2020-10-24 PROCEDURE — 6370000000 HC RX 637 (ALT 250 FOR IP): Performed by: HOSPITALIST

## 2020-10-24 PROCEDURE — 1200000000 HC SEMI PRIVATE

## 2020-10-24 PROCEDURE — 81001 URINALYSIS AUTO W/SCOPE: CPT

## 2020-10-24 PROCEDURE — 87040 BLOOD CULTURE FOR BACTERIA: CPT

## 2020-10-24 PROCEDURE — 71045 X-RAY EXAM CHEST 1 VIEW: CPT

## 2020-10-24 PROCEDURE — 6360000002 HC RX W HCPCS: Performed by: HOSPITALIST

## 2020-10-24 PROCEDURE — 94761 N-INVAS EAR/PLS OXIMETRY MLT: CPT

## 2020-10-24 PROCEDURE — 97110 THERAPEUTIC EXERCISES: CPT

## 2020-10-24 PROCEDURE — 6360000002 HC RX W HCPCS: Performed by: INTERNAL MEDICINE

## 2020-10-24 PROCEDURE — 83605 ASSAY OF LACTIC ACID: CPT

## 2020-10-24 PROCEDURE — 84484 ASSAY OF TROPONIN QUANT: CPT

## 2020-10-24 PROCEDURE — 85610 PROTHROMBIN TIME: CPT

## 2020-10-24 PROCEDURE — 2580000003 HC RX 258: Performed by: HOSPITALIST

## 2020-10-24 PROCEDURE — 36415 COLL VENOUS BLD VENIPUNCTURE: CPT

## 2020-10-24 RX ORDER — LEVOFLOXACIN 5 MG/ML
750 INJECTION, SOLUTION INTRAVENOUS
Status: DISCONTINUED | OUTPATIENT
Start: 2020-10-24 | End: 2020-10-25 | Stop reason: DRUGHIGH

## 2020-10-24 RX ADMIN — AMLODIPINE BESYLATE 5 MG: 5 TABLET ORAL at 08:29

## 2020-10-24 RX ADMIN — CYANOCOBALAMIN TAB 500 MCG 1000 MCG: 500 TAB at 08:29

## 2020-10-24 RX ADMIN — Medication 10 ML: at 08:30

## 2020-10-24 RX ADMIN — LEVOFLOXACIN 750 MG: 5 INJECTION, SOLUTION INTRAVENOUS at 18:49

## 2020-10-24 RX ADMIN — POTASSIUM CHLORIDE AND SODIUM CHLORIDE: 900; 150 INJECTION, SOLUTION INTRAVENOUS at 20:53

## 2020-10-24 RX ADMIN — NITROGLYCERIN 0.4 MG: 0.4 TABLET SUBLINGUAL at 17:59

## 2020-10-24 RX ADMIN — POTASSIUM CHLORIDE AND SODIUM CHLORIDE: 900; 150 INJECTION, SOLUTION INTRAVENOUS at 12:17

## 2020-10-24 RX ADMIN — MAGNESIUM GLUCONATE 500 MG ORAL TABLET 200 MG: 500 TABLET ORAL at 08:29

## 2020-10-24 RX ADMIN — DEXAMETHASONE SODIUM PHOSPHATE 6 MG: 10 INJECTION INTRAMUSCULAR; INTRAVENOUS at 18:27

## 2020-10-24 RX ADMIN — INSULIN LISPRO 6 UNITS: 100 INJECTION, SOLUTION INTRAVENOUS; SUBCUTANEOUS at 08:35

## 2020-10-24 RX ADMIN — ACETAMINOPHEN 650 MG: 650 SUPPOSITORY RECTAL at 17:54

## 2020-10-24 RX ADMIN — B-COMPLEX W/ C & FOLIC ACID TAB 1 TABLET: TAB at 08:29

## 2020-10-24 RX ADMIN — INSULIN LISPRO 2 UNITS: 100 INJECTION, SOLUTION INTRAVENOUS; SUBCUTANEOUS at 12:18

## 2020-10-24 RX ADMIN — ROSUVASTATIN CALCIUM 10 MG: 10 TABLET, FILM COATED ORAL at 08:29

## 2020-10-24 RX ADMIN — LISINOPRIL 40 MG: 20 TABLET ORAL at 08:29

## 2020-10-24 RX ADMIN — PANTOPRAZOLE SODIUM 40 MG: 40 TABLET, DELAYED RELEASE ORAL at 08:29

## 2020-10-24 RX ADMIN — GUAIFENESIN AND DEXTROMETHORPHAN 5 ML: 100; 10 SYRUP ORAL at 00:06

## 2020-10-24 RX ADMIN — GUAIFENESIN AND DEXTROMETHORPHAN 5 ML: 100; 10 SYRUP ORAL at 12:27

## 2020-10-24 RX ADMIN — LEVOTHYROXINE SODIUM 112 MCG: 112 TABLET ORAL at 06:47

## 2020-10-24 RX ADMIN — ACETAMINOPHEN 650 MG: 325 TABLET ORAL at 00:06

## 2020-10-24 RX ADMIN — INSULIN LISPRO 6 UNITS: 100 INJECTION, SOLUTION INTRAVENOUS; SUBCUTANEOUS at 18:49

## 2020-10-24 RX ADMIN — INSULIN LISPRO 3 UNITS: 100 INJECTION, SOLUTION INTRAVENOUS; SUBCUTANEOUS at 20:52

## 2020-10-24 ASSESSMENT — PAIN SCALES - GENERAL
PAINLEVEL_OUTOF10: 0
PAINLEVEL_OUTOF10: 5
PAINLEVEL_OUTOF10: 0

## 2020-10-24 ASSESSMENT — PAIN DESCRIPTION - PAIN TYPE: TYPE: ACUTE PAIN

## 2020-10-24 ASSESSMENT — PAIN DESCRIPTION - DESCRIPTORS: DESCRIPTORS: ACHING;DISCOMFORT

## 2020-10-24 ASSESSMENT — PAIN DESCRIPTION - LOCATION: LOCATION: BACK

## 2020-10-24 ASSESSMENT — PAIN - FUNCTIONAL ASSESSMENT: PAIN_FUNCTIONAL_ASSESSMENT: ACTIVITIES ARE NOT PREVENTED

## 2020-10-24 ASSESSMENT — PAIN DESCRIPTION - ORIENTATION: ORIENTATION: LOWER

## 2020-10-24 NOTE — PROGRESS NOTES
Pharmacy to Dose Warfarin     Dx: afib  Goal INR range 2-3   Home Warfarin dose:  7.5 mg on Tues, Thur, Sat     Date                 INR                  Warfarin  10-21              1.89                   no dose  10/22              1.93                   7.5mg  10/23              2.15                   7.5mg  10/24              3.33                   Hold     Recommend holding Warfarin tonight. Daily INR ordered. Rx will continue to manage therapy per Dr. Becerra May consult order.   Shazia Mendoza PharmD 10/24/2020 7:47 AM

## 2020-10-24 NOTE — PLAN OF CARE
Problem: Falls - Risk of:  Goal: Will remain free from falls  Description: Will remain free from falls  10/24/2020 1047 by Deb Joiner RN  Outcome: Ongoing  10/24/2020 0623 by Kyler Valles RN  Outcome: Ongoing  Goal: Absence of physical injury  Description: Absence of physical injury  10/24/2020 1047 by Deb Joiner RN  Outcome: Ongoing  10/24/2020 0623 by Kyler Valles RN  Outcome: Ongoing     Problem: Airway Clearance - Ineffective  Goal: Achieve or maintain patent airway  10/24/2020 1047 by Deb Joiner RN  Outcome: Ongoing  10/24/2020 0623 by Kyler Valles RN  Outcome: Ongoing     Problem: Gas Exchange - Impaired  Goal: Absence of hypoxia  10/24/2020 1047 by Deb Joiner RN  Outcome: Ongoing  10/24/2020 0623 by Kyler Valles RN  Outcome: Ongoing  Goal: Promote optimal lung function  10/24/2020 1047 by Deb Joiner RN  Outcome: Ongoing  10/24/2020 0623 by Kyler Valles RN  Outcome: Ongoing     Problem: Breathing Pattern - Ineffective  Goal: Ability to achieve and maintain a regular respiratory rate  10/24/2020 1047 by Deb Joiner RN  Outcome: Ongoing  10/24/2020 0623 by Kyler Valles RN  Outcome: Ongoing     Problem:  Body Temperature -  Risk of, Imbalanced  Goal: Ability to maintain a body temperature within defined limits  10/24/2020 1047 by Deb Joiner RN  Outcome: Ongoing  10/24/2020 0623 by Kyler Valles RN  Outcome: Ongoing  Goal: Will regain or maintain usual level of consciousness  10/24/2020 1047 by Deb Joiner RN  Outcome: Ongoing  10/24/2020 0623 by Kyler Valles RN  Outcome: Ongoing  Goal: Complications related to the disease process, condition or treatment will be avoided or minimized  10/24/2020 1047 by Deb Joiner RN  Outcome: Ongoing  10/24/2020 0623 by Kyler Valles RN  Outcome: Ongoing     Problem: Isolation Precautions - Risk of Spread of Infection  Goal: Prevent transmission of infection  10/24/2020 1047 by Robert Houston RN  Outcome: Ongoing  10/24/2020 0623 by Shakir Xiong RN  Outcome: Ongoing     Problem: Nutrition Deficits  Goal: Optimize nutrtional status  10/24/2020 1047 by Robert Houston RN  Outcome: Ongoing  10/24/2020 0623 by Shakir Xiong RN  Outcome: Ongoing     Problem: Risk for Fluid Volume Deficit  Goal: Maintain normal heart rhythm  10/24/2020 1047 by Robert Houston RN  Outcome: Ongoing  10/24/2020 0623 by Shakir Xiong RN  Outcome: Ongoing  Goal: Maintain absence of muscle cramping  10/24/2020 1047 by Robert Houston RN  Outcome: Ongoing  10/24/2020 0623 by Shakir Xiong RN  Outcome: Ongoing  Goal: Maintain normal serum potassium, sodium, calcium, phosphorus, and pH  10/24/2020 1047 by Robert Houston RN  Outcome: Ongoing  10/24/2020 0623 by Shakir Xiong RN  Outcome: Ongoing     Problem: Loneliness or Risk for Loneliness  Goal: Demonstrate positive use of time alone when socialization is not possible  10/24/2020 1047 by Robert Houston RN  Outcome: Ongoing  10/24/2020 0623 by Shakir Xiong RN  Outcome: Ongoing     Problem: Fatigue  Goal: Verbalize increase energy and improved vitality  10/24/2020 1047 by Robert Houston RN  Outcome: Ongoing  10/24/2020 0623 by Shakir Xiong RN  Outcome: Ongoing     Problem: Patient Education: Go to Patient Education Activity  Goal: Patient/Family Education  10/24/2020 1047 by Robert Houston RN  Outcome: Ongoing  10/24/2020 0623 by Shakir Xiong RN  Outcome: Ongoing     Problem: Pain:  Goal: Pain level will decrease  Description: Pain level will decrease  10/24/2020 1047 by Robert Houston RN  Outcome: Ongoing  10/24/2020 0623 by Shakir Xiong RN  Outcome: Ongoing  Goal: Control of acute pain  Description: Control of acute pain  10/24/2020 1047 by Robert Houston RN  Outcome: Ongoing  10/24/2020 0623 by Shakir Xiong RN  Outcome: Ongoing  Goal: Control of chronic pain  Description: Control of chronic pain  10/24/2020 1047 by Talisha Lockhart RN  Outcome: Ongoing  10/24/2020 0623 by Andrew Espinoza RN  Outcome: Ongoing

## 2020-10-24 NOTE — PROGRESS NOTES
Rapid response callle. Patient temp 102 93 room air. Tylenol supository given 1754. telemonitor ordered. 1757 cbc bmp, troponin ordered. Nitro given at 1759. 230/103 b/p 1800 170/81 repeated. EKG ordered. Patient wake alert and oriented. Complaining of chest pain. 1803 patient states chest pain feels better after nitro. 1805 159/76, heart rate 103. 1806 rapid concluded. Plan is to place telemonitor, and monitor heart rthyme and review troponin level.

## 2020-10-24 NOTE — PROGRESS NOTES
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 Pt unable to dorsiflex to achieve L foot clearance. Pt stands with trunk/pelvic rotation to L side to enable L leg advancement with adduction to compensate for hip flexion weakness. Assistive Device Used:    rolling walker (RW)  Level of Assist:    Min A progressing to mod A with fatigue  Comment: Assistance provided with swing phase on LLE as well as stabilization of lateral posterior knee to prevent buckling LLE    Stair Training deferred, pt unsafe/not appropriate to complete stairs at this time  # of Steps:   N/A  Level of Assist:  Not Tested  UE Support:  NA  Assistive Device:  N/A  Pattern:   N/A  Comments: n/a    Therapeutic Exercise all completed bilaterally unless indicated  Ankle Pumps x 10 reps  LAQ x 10 reps  marching x 10 reps  *note pt unable to dorsiflex L ankle    Balance  Sitting (static):  Fair + in chair  Comments:     Standing: Fair +; CGA  Comments: Pt stands with decreased weight bearing on LLE due to baseline weakness. Able to stand with unilateral UE support to use adjust LUE on RW handle, min A for balance    Patient Education      Role of PT, POC, Discharge recommendations, safety awareness, transfer techniques, pursed lip breathing and calling for assist with mobility. Positioning Needs       Pt up in chair, alarm set, positioned in proper neutral alignment and pressure relief provided. Call light provided and all needs within reach     Activity Tolerance   Pt completed therapy session with SOB noted with activity. SpO2: >/= 92% on RA    Other  None. Assessment :  Patient demonstrates improve sitting and standing balance this date.  Increased assist requiring with LLE management with ambulation with final ~ 8 ft of ambulation. Noted use of momentum to perform LAQ on LLE due to weakness, AAROM provided to improve execution of exercise. Recommending SNF upon discharge as patient functioning well below baseline, demonstrates good rehab potential and unable to return home due to burden of care beyond caregiver ability, home environment not conducive to patient recovery and limited safety awareness. Goals (all goals ongoing unless otherwise indicated)  To be met in 3 visits:  1). Independent with LE Ex x 10 reps     To be met in 6 visits:  1). Supine to/from sit: SBA  2). Sit to/from stand: SBA  3). Bed to chair: SBA  4). Gait: Ambulate 150 ft.  with  SBA and use of LRAD (least restrictive assistive device)  5). Tolerate B LE exercises 3 sets of 10-15 reps    Plan   Continue with plan of care. Signature: Lorrie Carrion, PT, DPT #[id number]    If patient discharges from this facility prior to next visit, this note will serve as the Discharge Summary.

## 2020-10-24 NOTE — PLAN OF CARE
Problem: Falls - Risk of:  Goal: Will remain free from falls  Description: Will remain free from falls  Outcome: Ongoing  Goal: Absence of physical injury  Description: Absence of physical injury  Outcome: Ongoing     Problem: Airway Clearance - Ineffective  Goal: Achieve or maintain patent airway  Outcome: Ongoing     Problem: Gas Exchange - Impaired  Goal: Absence of hypoxia  Outcome: Ongoing  Goal: Promote optimal lung function  Outcome: Ongoing     Problem: Breathing Pattern - Ineffective  Goal: Ability to achieve and maintain a regular respiratory rate  Outcome: Ongoing     Problem:  Body Temperature -  Risk of, Imbalanced  Goal: Ability to maintain a body temperature within defined limits  Outcome: Ongoing  Goal: Will regain or maintain usual level of consciousness  Outcome: Ongoing  Goal: Complications related to the disease process, condition or treatment will be avoided or minimized  Outcome: Ongoing     Problem: Isolation Precautions - Risk of Spread of Infection  Goal: Prevent transmission of infection  Outcome: Ongoing     Problem: Nutrition Deficits  Goal: Optimize nutrtional status  Outcome: Ongoing     Problem: Risk for Fluid Volume Deficit  Goal: Maintain normal heart rhythm  Outcome: Ongoing  Goal: Maintain absence of muscle cramping  Outcome: Ongoing  Goal: Maintain normal serum potassium, sodium, calcium, phosphorus, and pH  Outcome: Ongoing     Problem: Loneliness or Risk for Loneliness  Goal: Demonstrate positive use of time alone when socialization is not possible  Outcome: Ongoing     Problem: Fatigue  Goal: Verbalize increase energy and improved vitality  Outcome: Ongoing     Problem: Patient Education: Go to Patient Education Activity  Goal: Patient/Family Education  Outcome: Ongoing     Problem: Pain:  Goal: Pain level will decrease  Description: Pain level will decrease  Outcome: Ongoing  Goal: Control of acute pain  Description: Control of acute pain  Outcome: Ongoing  Goal: Control of chronic pain  Description: Control of chronic pain  Outcome: Ongoing

## 2020-10-24 NOTE — PROGRESS NOTES
Report given and care transferred to [facility]. Patient on 2 L O2. In stable condition at this time.

## 2020-10-24 NOTE — PROGRESS NOTES
Progress Note    Admit Date:  10/21/2020    Pt with  COVID-19 PNA     Subjective:  Ms. Mendez Prior spiking temperatures . T max yesterday was 102 °F .  Patient afebrile all day today . Appears ill . Oxygen sat stable on room air . at 550 pm --> she spiked a fever 103.5 Fahrenheit , became tachycardic. Objective:   Patient Vitals for the past 4 hrs:   BP Temp Temp src Pulse Resp SpO2 Height Weight   10/24/20 1924 -- -- -- -- 18 94 % -- --   10/24/20 1844 (!) 142/65 101.1 °F (38.4 °C) Oral 82 20 94 % -- --   10/24/20 1830 -- -- -- -- -- -- 5' 5\" (1.651 m) 188 lb 14.4 oz (85.7 kg)   10/24/20 1820 (!) 158/79 101.7 °F (38.7 °C) Oral 101 20 93 % -- --   10/24/20 1804 (!) 155/78 -- -- -- -- -- -- --   10/24/20 1756 -- 103.5 °F (39.7 °C) Rectal 103 -- -- -- --   10/24/20 1753 (!) 230/103 102.3 °F (39.1 °C) Oral -- -- 93 % -- --       Intake/Output Summary (Last 24 hours) at 10/24/2020 1642  Last data filed at 10/23/2020 2358  Gross per 24 hour   Intake 2910.3 ml   Output --   Net 2910.3 ml       Physical Exam:  General:  eldelry female,  Ill appearing . Awake, alert and oriented. Mucous Membranes:  Pink , anicteric  Neck: No JVD, no carotid bruit, no thyromegaly  Chest:  Clear to auscultation bilaterally, minimal basilar crackles   Cardiovascular: Tachycardic,  S1S2 heard, no murmurs or gallops  Abdomen:  Soft, undistended, non tender, no organomegaly, BS present  Extremities: No edema or cyanosis.  Distal pulses well felt  Neurological : chronic left UE and LE weakness  grossly normal        Scheduled Meds:   amLODIPine  5 mg Oral Daily    insulin lispro  0-12 Units Subcutaneous TID WC    insulin lispro  0-6 Units Subcutaneous Nightly    warfarin (COUMADIN) daily dosing (placeholder)   Other RX Placeholder    insulin lispro protamine & lispro  35 Units Subcutaneous BID WC    dexamethasone  6 mg Intravenous Q24H    vitamin B complex w/C  1 tablet Oral Daily    levothyroxine  112 mcg Oral Daily    lisinopril dose   - Continue SSI      Hypothyroidism  - Continue Synthroid     HTN  - BP is uncontrolled   - Continue lisinopril , increased home norvasc    Atrial fibrillation   - AC on Coumadin   -Tachycardic in the setting of high fevers. Monitor heart rate,  Coumadin held today due to elevated INR. Rectal bleeding  -In the setting of coagulopathy  -Monitor H&H    HLD  - Continue statin     DVT Prophylaxis: Coumadin   Diet: DIET CARB CONTROL;  Code Status: Full Code        Total time today 35 minutes. > 50 % time dominated by counseling/coordination of care. D/W RN , Pulmonology.       Gail Juarez MD 10/24/2020 4:42 PM

## 2020-10-24 NOTE — PROGRESS NOTES
Occupational Therapy Daily Treatment Note    Unit: [facility]  Date:  10/24/2020  Patient Name:    Marcel Grove  Admitting diagnosis:  Pneumonia [J18.9]  Admit Date:  10/21/2020  Precautions/Restrictions:  fall risk, IV, bed/chair alarm and Droplet Plus precautions (+ COVID 19) hx of CAV with left side deficits        Discharge Recommendations: SNF  DME needs for discharge: defer to facility       Therapy recommendations for staff:   Assist of 1 (minimal assist) with use of rolling walker (RW) for all ambulation to/from bathroom    AM-PAC Score: AM-PAC Inpatient Daily Activity Raw Score: 13  Home Health S4 Level: Level 1- Standard       Treatment Time:  1779-6873  Treatment number:  2    Total Treatment Time:   16 minutes    History of Present Illness: 80 y.o. female with 3 days worsening sob / coker / fatigue / diarrhea. Her  was diagnosed with Covid19 3 weeks ago. She has had a cough productive of yellowish sputum, but has felt well until 3-4 days ago. She's noted progressive generalized weakness, non productive cough and sob/coker. She denies chest pain, leg edema, fever, chills, change in taste or smell, or headache. Subjective:  Patient agreeable to limited therapy due to walking with PT earlier this PM and just returning back to chair after using BSC with nursing    Pain   No  Rating: NA  Location:no complaints of pain during session  Pain Medicine Status: No request made      Bed Mobility:   Supine to Sit:  Not Tested  Sit to Supine:  Not Tested  Rolling:           Not Tested  Scooting:        Not Tested    Transfer Training:   Sit to stand:   CGA  Stand to sit:  CGA  Bed to Chair:  Not Tested  Bed to [facility]:   Not Tested  Standard toilet:   Not Tested    Activity Tolerance   Pt completed therapy session with SOB noted with standing  SpO2:   HR:   BP:     ADL Training:   Upper body dressing:  Not Tested  Upper body bathing:  Not Tested  Lower body dressing:   Mod A  Lower body bathing:  Not Tested  Toileting:

## 2020-10-24 NOTE — SIGNIFICANT EVENT
Rapid Response Team  Progress Note  0224/0224-01      Event Date: 10/24/2020   Time Called: 1800 Arrival Time:1802Event End Time: 1882    [facility]   Resp Therapist Responder:Gustavo Bermudez  Patient RNARobin Pope    Attending MD: Chandler Samson, * Notified: No Time Notified: not done yet  Asked to come in?: No      Situation: (Brief reason RRT requested) Elevated temp and elevated hr    Paged Overhead: Yes   Previous MD Orders: tylenol and nitro per mar        Background: Admit Date: 10/21/2020    Code Status: full    Pertinent Medical/Surgical Hx: HTN, Diabetes, Afib      Assessment: BP: (!) 142/65 Pulse: 82 Resp: 20 Temp: 101.1 °F (38.4 °C) SpO2: 94 %    MEWS Score: 1     Mental Status: Alert and oriented   Neuro Check: wnl   CV: chest heaviness that does not worsen with deep breathing or palpation   Respiratory: easy o2 sat 93% on ra   Abdomen: round and soft   Other: n/a   Does Pt meet Early Sepsis warning signs? yes    Temp < 95F or > 100.4F     WBC > 12,000 or < 4000  Or > 10% bands     HR < 50 or > 110    RR < 8 or > 20    SBP < 100 or >200     Decreased Urine Output? Recommendation/Interventions:   RRT Orders: (Breathing, CP, Circulation, Neuro, Mews >4)  CP    Other: ekg, troponin   Results: <.01      Patient Outcome: Chest pressure was gone and pt states she feels better. Will recheck temp within 1 hour. Stayed on Unit: Yes   Transfer to Critical Care/Tele/ED: No   Code Blue: No   Code Status Changed to DNR: No    Rapid Response Team  1 Hour Follow-up     Results: Temp at 1844- 101.1, HR-82, resp 20, B/P-142/65  O2-94%. Troponin- <0.01. Rapid Response Team  12-24 Hour Follow-up    Pt continues with fevers. Receiving convalescent plasma today. BP (!) 142/74   Pulse 77   Temp 97.4 °F (36.3 °C) (Oral)   Resp 18   Ht 5' 5\" (1.651 m)   Wt 188 lb 14.4 oz (85.7 kg)   SpO2 95%   Breastfeeding No   BMI 31.43 kg/m²   On 2L nc. Continue POC.

## 2020-10-24 NOTE — PROGRESS NOTES
Rapid response called, patient \"acting weird\" per PCA. Patient assisted back to bed by staff. New set of vital signs obtained and labs drawn. EKG done. Clinical  and this RN at bedside.

## 2020-10-24 NOTE — PROGRESS NOTES
10/24/20 0700   Vital Signs   Temp 98.3 °F (36.8 °C)   Temp Source Oral   Pulse 65   Heart Rate Source Monitor   Resp 16   BP (!) 148/75   BP Location Right upper arm   Level of Consciousness 0   MEWS Score 1   Patient Currently in Pain Denies   Pain Assessment   Pain Assessment 0-10   Pain Level 0   Oxygen Therapy   SpO2 95 %   O2 Device None (Room air)     Medications given and assessment completed. Patient is alert and oriented, sitting up in her chair. Patient in stable condition at this time, denies needs, has call light within reach. Safety maintained.

## 2020-10-24 NOTE — FLOWSHEET NOTE
10/23/20 2100   Vital Signs   Temp 98.3 °F (36.8 °C)   Temp Source Oral   Pulse 82   Heart Rate Source Monitor   Resp 18   /68   BP Location Right upper arm   MAP (mmHg) 90   Patient Position Semi fowlers   Level of Consciousness 0   MEWS Score 1   Patient Currently in Pain Denies   Pain Assessment   Pain Level 0   Oxygen Therapy   SpO2 92 %   O2 Device None (Room air)     Pt A/Ox4. VSS. Pt unlabored, respirations even & easy. No distress noted. Shift assessment complete. See flowsheet. PM meds given. See MAR. Assisted pt from chair to bedside commode. Return to bed. Denies needs at this time. Bed alarm on. Bed in low position. Call light within reach. Will continue to monitor.

## 2020-10-25 LAB
ABO/RH: NORMAL
ALBUMIN SERPL-MCNC: 3.6 G/DL (ref 3.4–5)
ALP BLD-CCNC: 51 U/L (ref 40–129)
ALT SERPL-CCNC: 20 U/L (ref 10–40)
ANION GAP SERPL CALCULATED.3IONS-SCNC: 11 MMOL/L (ref 3–16)
ANTIBODY SCREEN: NORMAL
AST SERPL-CCNC: 23 U/L (ref 15–37)
BASOPHILS ABSOLUTE: 0 K/UL (ref 0–0.2)
BASOPHILS RELATIVE PERCENT: 0.1 %
BILIRUB SERPL-MCNC: 0.4 MG/DL (ref 0–1)
BILIRUBIN DIRECT: <0.2 MG/DL (ref 0–0.3)
BILIRUBIN, INDIRECT: NORMAL MG/DL (ref 0–1)
BLOOD BANK DISPENSE STATUS: NORMAL
BLOOD BANK DISPENSE STATUS: NORMAL
BLOOD BANK PRODUCT CODE: NORMAL
BLOOD BANK PRODUCT CODE: NORMAL
BPU ID: NORMAL
BPU ID: NORMAL
BUN BLDV-MCNC: 18 MG/DL (ref 7–20)
CALCIUM SERPL-MCNC: 8.1 MG/DL (ref 8.3–10.6)
CHLORIDE BLD-SCNC: 105 MMOL/L (ref 99–110)
CO2: 20 MMOL/L (ref 21–32)
CREAT SERPL-MCNC: 0.7 MG/DL (ref 0.6–1.2)
DESCRIPTION BLOOD BANK: NORMAL
DESCRIPTION BLOOD BANK: NORMAL
EKG ATRIAL RATE: 90 BPM
EKG DIAGNOSIS: NORMAL
EKG P AXIS: 53 DEGREES
EKG P-R INTERVAL: 160 MS
EKG Q-T INTERVAL: 400 MS
EKG QRS DURATION: 128 MS
EKG QTC CALCULATION (BAZETT): 489 MS
EKG R AXIS: 1 DEGREES
EKG T AXIS: 13 DEGREES
EKG VENTRICULAR RATE: 90 BPM
EOSINOPHILS ABSOLUTE: 0 K/UL (ref 0–0.6)
EOSINOPHILS RELATIVE PERCENT: 0 %
GFR AFRICAN AMERICAN: >60
GFR NON-AFRICAN AMERICAN: >60
GLUCOSE BLD-MCNC: 154 MG/DL (ref 70–99)
GLUCOSE BLD-MCNC: 168 MG/DL (ref 70–99)
GLUCOSE BLD-MCNC: 209 MG/DL (ref 70–99)
GLUCOSE BLD-MCNC: 279 MG/DL (ref 70–99)
GLUCOSE BLD-MCNC: 299 MG/DL (ref 70–99)
HCT VFR BLD CALC: 38.7 % (ref 36–48)
HEMOGLOBIN: 13 G/DL (ref 12–16)
INR BLD: 3.46 (ref 0.86–1.14)
LYMPHOCYTES ABSOLUTE: 0.6 K/UL (ref 1–5.1)
LYMPHOCYTES RELATIVE PERCENT: 14.7 %
MCH RBC QN AUTO: 29.8 PG (ref 26–34)
MCHC RBC AUTO-ENTMCNC: 33.7 G/DL (ref 31–36)
MCV RBC AUTO: 88.4 FL (ref 80–100)
MONOCYTES ABSOLUTE: 0.4 K/UL (ref 0–1.3)
MONOCYTES RELATIVE PERCENT: 9.8 %
NEUTROPHILS ABSOLUTE: 3 K/UL (ref 1.7–7.7)
NEUTROPHILS RELATIVE PERCENT: 75.4 %
PDW BLD-RTO: 13.7 % (ref 12.4–15.4)
PERFORMED ON: ABNORMAL
PLATELET # BLD: 120 K/UL (ref 135–450)
PMV BLD AUTO: 9.8 FL (ref 5–10.5)
POTASSIUM SERPL-SCNC: 4.3 MMOL/L (ref 3.5–5.1)
PROTHROMBIN TIME: 40.6 SEC (ref 10–13.2)
RBC # BLD: 4.38 M/UL (ref 4–5.2)
SODIUM BLD-SCNC: 136 MMOL/L (ref 136–145)
TOTAL PROTEIN: 6.4 G/DL (ref 6.4–8.2)
WBC # BLD: 4 K/UL (ref 4–11)

## 2020-10-25 PROCEDURE — 80048 BASIC METABOLIC PNL TOTAL CA: CPT

## 2020-10-25 PROCEDURE — XW033E5 INTRODUCTION OF REMDESIVIR ANTI-INFECTIVE INTO PERIPHERAL VEIN, PERCUTANEOUS APPROACH, NEW TECHNOLOGY GROUP 5: ICD-10-PCS | Performed by: INTERNAL MEDICINE

## 2020-10-25 PROCEDURE — 93010 ELECTROCARDIOGRAM REPORT: CPT | Performed by: INTERNAL MEDICINE

## 2020-10-25 PROCEDURE — 36415 COLL VENOUS BLD VENIPUNCTURE: CPT

## 2020-10-25 PROCEDURE — 6360000002 HC RX W HCPCS: Performed by: INTERNAL MEDICINE

## 2020-10-25 PROCEDURE — 2580000003 HC RX 258: Performed by: HOSPITALIST

## 2020-10-25 PROCEDURE — 99223 1ST HOSP IP/OBS HIGH 75: CPT | Performed by: INTERNAL MEDICINE

## 2020-10-25 PROCEDURE — 80076 HEPATIC FUNCTION PANEL: CPT

## 2020-10-25 PROCEDURE — 86901 BLOOD TYPING SEROLOGIC RH(D): CPT

## 2020-10-25 PROCEDURE — 86850 RBC ANTIBODY SCREEN: CPT

## 2020-10-25 PROCEDURE — 6370000000 HC RX 637 (ALT 250 FOR IP): Performed by: INTERNAL MEDICINE

## 2020-10-25 PROCEDURE — 2580000003 HC RX 258: Performed by: INTERNAL MEDICINE

## 2020-10-25 PROCEDURE — 36430 TRANSFUSION BLD/BLD COMPNT: CPT

## 2020-10-25 PROCEDURE — 86900 BLOOD TYPING SEROLOGIC ABO: CPT

## 2020-10-25 PROCEDURE — 6360000002 HC RX W HCPCS: Performed by: HOSPITALIST

## 2020-10-25 PROCEDURE — 85025 COMPLETE CBC W/AUTO DIFF WBC: CPT

## 2020-10-25 PROCEDURE — 1200000000 HC SEMI PRIVATE

## 2020-10-25 PROCEDURE — 2500000003 HC RX 250 WO HCPCS: Performed by: HOSPITALIST

## 2020-10-25 PROCEDURE — 6370000000 HC RX 637 (ALT 250 FOR IP): Performed by: HOSPITALIST

## 2020-10-25 PROCEDURE — 85610 PROTHROMBIN TIME: CPT

## 2020-10-25 RX ORDER — LEVOFLOXACIN 5 MG/ML
750 INJECTION, SOLUTION INTRAVENOUS EVERY 24 HOURS
Status: DISCONTINUED | OUTPATIENT
Start: 2020-10-25 | End: 2020-10-27 | Stop reason: HOSPADM

## 2020-10-25 RX ORDER — 0.9 % SODIUM CHLORIDE 0.9 %
20 INTRAVENOUS SOLUTION INTRAVENOUS ONCE
Status: COMPLETED | OUTPATIENT
Start: 2020-10-25 | End: 2020-10-25

## 2020-10-25 RX ORDER — SODIUM CHLORIDE 9 MG/ML
INJECTION, SOLUTION INTRAVENOUS
Status: COMPLETED
Start: 2020-10-25 | End: 2020-10-26

## 2020-10-25 RX ADMIN — B-COMPLEX W/ C & FOLIC ACID TAB 1 TABLET: TAB at 08:55

## 2020-10-25 RX ADMIN — AMLODIPINE BESYLATE 5 MG: 5 TABLET ORAL at 08:55

## 2020-10-25 RX ADMIN — LISINOPRIL 40 MG: 20 TABLET ORAL at 08:54

## 2020-10-25 RX ADMIN — ACETAMINOPHEN 650 MG: 325 TABLET ORAL at 00:10

## 2020-10-25 RX ADMIN — GUAIFENESIN AND DEXTROMETHORPHAN 5 ML: 100; 10 SYRUP ORAL at 15:31

## 2020-10-25 RX ADMIN — SODIUM CHLORIDE 20 ML: 9 INJECTION, SOLUTION INTRAVENOUS at 15:28

## 2020-10-25 RX ADMIN — INSULIN LISPRO 2 UNITS: 100 INJECTION, SOLUTION INTRAVENOUS; SUBCUTANEOUS at 18:20

## 2020-10-25 RX ADMIN — INSULIN LISPRO 4 UNITS: 100 INJECTION, SOLUTION INTRAVENOUS; SUBCUTANEOUS at 14:26

## 2020-10-25 RX ADMIN — LEVOTHYROXINE SODIUM 112 MCG: 112 TABLET ORAL at 05:22

## 2020-10-25 RX ADMIN — INSULIN LISPRO 6 UNITS: 100 INJECTION, SOLUTION INTRAVENOUS; SUBCUTANEOUS at 09:00

## 2020-10-25 RX ADMIN — DEXAMETHASONE SODIUM PHOSPHATE 6 MG: 10 INJECTION INTRAMUSCULAR; INTRAVENOUS at 18:21

## 2020-10-25 RX ADMIN — REMDESIVIR 200 MG: 100 INJECTION, POWDER, LYOPHILIZED, FOR SOLUTION INTRAVENOUS at 10:54

## 2020-10-25 RX ADMIN — PANTOPRAZOLE SODIUM 40 MG: 40 TABLET, DELAYED RELEASE ORAL at 08:55

## 2020-10-25 RX ADMIN — ACETAMINOPHEN 650 MG: 325 TABLET ORAL at 18:33

## 2020-10-25 RX ADMIN — Medication 10 ML: at 08:54

## 2020-10-25 RX ADMIN — ROSUVASTATIN CALCIUM 10 MG: 10 TABLET, FILM COATED ORAL at 08:55

## 2020-10-25 RX ADMIN — Medication 10 ML: at 22:55

## 2020-10-25 RX ADMIN — CYANOCOBALAMIN TAB 500 MCG 1000 MCG: 500 TAB at 08:54

## 2020-10-25 RX ADMIN — MAGNESIUM GLUCONATE 500 MG ORAL TABLET 200 MG: 500 TABLET ORAL at 08:54

## 2020-10-25 RX ADMIN — LEVOFLOXACIN 750 MG: 5 INJECTION, SOLUTION INTRAVENOUS at 18:31

## 2020-10-25 RX ADMIN — INSULIN LISPRO 1 UNITS: 100 INJECTION, SOLUTION INTRAVENOUS; SUBCUTANEOUS at 22:55

## 2020-10-25 ASSESSMENT — PAIN SCALES - GENERAL
PAINLEVEL_OUTOF10: 0
PAINLEVEL_OUTOF10: 0
PAINLEVEL_OUTOF10: 5

## 2020-10-25 NOTE — PROGRESS NOTES
Report given and care transferred to [facility]note 3[facility] [address] [place] [address]. Patient in stable condition at time of care transfer. Patient denies needs at time of care transfer.

## 2020-10-25 NOTE — PROGRESS NOTES
Pharmacy to Dose Warfarin     Dx: afib  Goal INR range 2-3   Home Warfarin dose:  7.5 mg on Tues, Thur, Sat     Date                 INR                  Warfarin  10-21              1.89                   no dose  10/22              1.93                   7.5mg  10/23              2.15                   7.5mg  10/24              3.33                   Hold  10/25              3.46                   Hold      IV Levaquin started 10/24. Recommend holding Warfarin tonight. Daily INR ordered. Rx will continue to manage therapy per Dr. Margy Chance consult order.   Nany Patel PharmD 10/25/2020 8:37 AM

## 2020-10-25 NOTE — PLAN OF CARE
Problem: Falls - Risk of:  Goal: Will remain free from falls  Description: Will remain free from falls  10/24/2020 2041 by Gilberto Mcmahon RN  Outcome: Ongoing  10/24/2020 1047 by Qi Abreu RN  Outcome: Ongoing  Goal: Absence of physical injury  Description: Absence of physical injury  10/24/2020 2041 by Gilberto Mcmahon RN  Outcome: Ongoing  10/24/2020 1047 by Qi Abreu RN  Outcome: Ongoing     Problem: Airway Clearance - Ineffective  Goal: Achieve or maintain patent airway  10/24/2020 2041 by Gilberto Mcmahon RN  Outcome: Ongoing  10/24/2020 1047 by Qi Abreu RN  Outcome: Ongoing     Problem: Gas Exchange - Impaired  Goal: Absence of hypoxia  10/24/2020 2041 by Gilberto Mcmahon RN  Outcome: Ongoing  10/24/2020 1047 by Qi Abreu RN  Outcome: Ongoing  Goal: Promote optimal lung function  10/24/2020 2041 by Gilberto Mcmahon RN  Outcome: Ongoing  10/24/2020 1047 by Qi Abreu RN  Outcome: Ongoing     Problem: Breathing Pattern - Ineffective  Goal: Ability to achieve and maintain a regular respiratory rate  10/24/2020 2041 by Gilberto Mcmahon RN  Outcome: Ongoing  10/24/2020 1047 by Qi Abreu RN  Outcome: Ongoing     Problem:  Body Temperature -  Risk of, Imbalanced  Goal: Ability to maintain a body temperature within defined limits  10/24/2020 2041 by Gilberto Mcmahon RN  Outcome: Ongoing  10/24/2020 1047 by Qi Abreu RN  Outcome: Ongoing  Goal: Will regain or maintain usual level of consciousness  10/24/2020 2041 by Gilberto Mcmahon RN  Outcome: Ongoing  10/24/2020 1047 by Qi Abreu RN  Outcome: Ongoing  Goal: Complications related to the disease process, condition or treatment will be avoided or minimized  10/24/2020 2041 by Gilberto Mcmahon RN  Outcome: Ongoing  10/24/2020 1047 by Qi Abreu RN  Outcome: Ongoing     Problem: Isolation Precautions - Risk of Spread of Infection  Goal: Prevent transmission of infection  10/24/2020 2041 by Melida Carmona RN  Outcome: Ongoing  10/24/2020 1047 by Riley Isabel RN  Outcome: Ongoing     Problem: Nutrition Deficits  Goal: Optimize nutrtional status  10/24/2020 2041 by Melida Carmona RN  Outcome: Ongoing  10/24/2020 1047 by Riley Isabel RN  Outcome: Ongoing     Problem: Risk for Fluid Volume Deficit  Goal: Maintain normal heart rhythm  10/24/2020 2041 by Melida Carmona RN  Outcome: Ongoing  10/24/2020 1047 by Riley Isabel RN  Outcome: Ongoing  Goal: Maintain absence of muscle cramping  10/24/2020 2041 by Melida Carmona RN  Outcome: Ongoing  10/24/2020 1047 by Riley Isabel RN  Outcome: Ongoing  Goal: Maintain normal serum potassium, sodium, calcium, phosphorus, and pH  10/24/2020 2041 by Melida Carmona RN  Outcome: Ongoing  10/24/2020 1047 by Riley Isabel RN  Outcome: Ongoing     Problem: Loneliness or Risk for Loneliness  Goal: Demonstrate positive use of time alone when socialization is not possible  10/24/2020 2041 by Melida Carmona RN  Outcome: Ongoing  10/24/2020 1047 by Riley Isabel RN  Outcome: Ongoing     Problem: Fatigue  Goal: Verbalize increase energy and improved vitality  10/24/2020 2041 by Melida Carmona RN  Outcome: Ongoing  10/24/2020 1047 by Riley Isabel RN  Outcome: Ongoing     Problem: Patient Education: Go to Patient Education Activity  Goal: Patient/Family Education  10/24/2020 2041 by Melida Carmona RN  Outcome: Ongoing  10/24/2020 1047 by Riley Isabel RN  Outcome: Ongoing     Problem: Pain:  Goal: Pain level will decrease  Description: Pain level will decrease  10/24/2020 2041 by Melida Carmona RN  Outcome: Ongoing  10/24/2020 1047 by Riley Isabel RN  Outcome: Ongoing  Goal: Control of acute pain  Description: Control of acute pain  10/24/2020 1047 by Riley Isabel RN  Outcome: Ongoing  Goal: Control of chronic pain  Description: Control of chronic pain  10/24/2020 2041 by Gina Platt RN  Outcome: Ongoing  10/24/2020 1047 by Kaley Garber RN  Outcome: Ongoing

## 2020-10-25 NOTE — CONSULTS
Reason for referral and CC: COVID 19 pneumonia    HISTORY OF PRESENT ILLNESS: 2year-old female with a history of stroke with left-sided weakness and diabetes presented today with high fevers and shortness of breath. Both her and her  have COVID-19 virus. The test was completed on Tuesday and resulted on Friday however they felt sick for several days before that. Fever 103.5. Shortness of breath and fatigue and weakness and diarrhea. Nonproductive cough. Patient was admitted 3 days ago but was not on oxygen until today. Past Medical History:   Diagnosis Date    Atrial fibrillation ([address]     Cancer (HCC)     Cerebral artery occlusion with cerebral infarction ([address]     left sided weakness-left arm and left leg, also left eye    Diabetes mellitus ([address]     Fractures     Hypertension     Thyroid disease      Past Surgical History:   Procedure Laterality Date    ANKLE FRACTURE SURGERY  2019    left ankle    ANKLE SURGERY Left 6/17/2014    Removal painful hardware    CHOLECYSTECTOMY      CYST REMOVAL      chest x2, back x1    HAND SURGERY      right    HAND SURGERY  2/21/12    DUPUYTRENS RELEASE LEFT MIDDLE AND RING FINGERS INCLUDING    HYSTERECTOMY      THYROIDECTOMY       Family History  family history includes Cancer in her brother and mother; Depression in her maternal aunt; Diabetes in her brother, brother, and mother; Heart Disease in her brother and brother; High Blood Pressure in her brother, brother, and brother. Social History:  reports that she has never smoked. She has never used smokeless tobacco.   reports no history of alcohol use. ALLERGIES:  Patient is allergic to cephalexin; contrast [iodides]; and barium-containing compounds.   Continuous Infusions:   0.9% NaCl with KCl 20 mEq 75 mL/hr at 10/24/20 2053    dextrose       Scheduled Meds:   levofloxacin  750 mg Intravenous Q48H    amLODIPine  5 mg Oral Daily    insulin lispro  0-12 Units Subcutaneous TID WC    insulin lispro  0-6 Units Subcutaneous Nightly    warfarin (COUMADIN) daily dosing (placeholder)   Other RX Placeholder    insulin lispro protamine & lispro  35 Units Subcutaneous BID WC    dexamethasone  6 mg Intravenous Q24H    vitamin B complex w/C  1 tablet Oral Daily    levothyroxine  112 mcg Oral Daily    lisinopril  40 mg Oral Daily    magnesium oxide  200 mg Oral Daily    pantoprazole  40 mg Oral Daily    rosuvastatin  10 mg Oral Daily    vitamin B-12  1,000 mcg Oral Daily    sodium chloride flush  10 mL Intravenous 2 times per day     PRN Meds:  guaiFENesin-dextromethorphan, prochlorperazine, nitroGLYCERIN, sodium chloride flush, acetaminophen **OR** acetaminophen, polyethylene glycol, glucose, dextrose, glucagon (rDNA), dextrose    REVIEW OF SYSTEMS:  Constitutional: + fever  HENT: Negative for sore throat  Eyes: Negative for redness   Respiratory: + dyspnea, cough  Cardiovascular: Negative for chest pain  Gastrointestinal: Negative for vomiting, diarrhea   Genitourinary: Negative for hematuria   Musculoskeletal: Negative for arthralgias   Skin: Negative for rash  Neurological: Negative for syncope  Hematological: Negative for adenopathy  Psychiatric/Behavorial: Negative for anxiety    PHYSICAL EXAM: BP (!) 168/86   Pulse 64   Temp 97 °F (36.1 °C) (Oral)   Resp 18   Ht 5' 5\" (1.651 m)   Wt 188 lb 14.4 oz (85.7 kg)   SpO2 95%   Breastfeeding No   BMI 31.43 kg/m²  on 2lpm  Constitutional:  No acute distress. Eyes: PERRL. Conjunctivae anicteric. ENT: Normal nose. Normal tongue. Neck:  Trachea is midline. No thyroid tenderness. Respiratory: No accessory muscle usage. decreased breath sounds. No wheezes. No rales. No Rhonchi. Cardiovascular: Normal S1S2. No digit clubbing. No digit cyanosis. No LE edema. Gastrointestinal: No mass palpated. No tenderness palpated. No umbilical hernia. Lymphatic: No cervical or supraclavicular adenopathy. Skin: No rash on the exposed extremities. No Nodules or induration on exposed extremities. Psychiatric: No anxiety or Agitation. Alert and Oriented to person, place and time. CBC:   Recent Labs     10/23/20  0541 10/24/20  0537 10/25/20  0626   WBC 6.0 4.3 4.0   HGB 13.9 12.7 13.0   HCT 41.8 38.6 38.7   MCV 89.6 90.0 88.4    125* 120*     BMP:   Recent Labs     10/25/20  0626      K 4.3      CO2 20*   BUN 18   CREATININE 0.7      No results for input(s): AST, ALT, LIPASE, BILIDIR, BILITOT, ALKPHOS in the last 72 hours. Invalid input(s): AMYLASE,  ALB  Recent Labs     10/23/20  0541 10/24/20  0538 10/25/20  0626   PROTIME 25.1* 39.1* 40.6*   INR 2.15* 3.33* 3.46*     Recent Labs     10/24/20  2106   COLORU Yellow   PHUR 5.0   WBCUA 3-5   RBCUA 0-2   BACTERIA Rare*   CLARITYU Clear   SPECGRAV >=1.030   LEUKOCYTESUR Negative   UROBILINOGEN 0.2   BILIRUBINUR Negative   BLOODU TRACE-INTACT*   GLUCOSEU >=1000*     No results for input(s): PHART, XDC5WDH, PO2ART in the last 72 hours. Chest imaging was reviewed by me and showed   There has been development of a small focus of airspace disease the left lung    base, pneumonia versus edema versus atelectasis.         There appears to be a background of pulmonary vascular congestion, suggesting    pulmonary edema.           ASSESSMENT:  · Acute hypoxic respiratory failure  · COVID 19 pneumonia  · H/o CVA with right side weakness  · Afib on coumadin  · Coagulopathy    PLAN:  Droplet + Precautions  Supplemental oxygen to maintain SaO2 >92%; wean as tolerated   · D#1 Remdesivir, follow LFT and renal function  · Transfuse 2u CCP  · D#4 Decadron  · Levaquin D#1  · Coumadin held for INR 3.46    Thank you Kei Montelongo, * for this consult

## 2020-10-25 NOTE — PROGRESS NOTES
Progress Note    Admit Date:  10/21/2020    Pt with  COVID-19 PNA   She is spiking high fevers now. T-max 103.5 °F last evening . overnight she remained febrile with temperatures up to 101 °F    Subjective:  Ms. Raphael Berman looks a little better today,  she is afebrile . Pulmonology consulted for COVID-19 pneumonia with persistent fevers . Plan is to start remdesivir and initiate CCP plus blood transfusions . I discussed with patient . Appears ill . Oxygen sat stable on 2 L      Objective:   Patient Vitals for the past 4 hrs:   BP Temp Temp src Pulse Resp SpO2   10/25/20 0747 (!) 168/86 97 °F (36.1 °C) Oral 64 18 95 %       Intake/Output Summary (Last 24 hours) at 10/25/2020 1107  Last data filed at 10/24/2020 2053  Gross per 24 hour   Intake 1445.33 ml   Output 200 ml   Net 1245.33 ml       Physical Exam:  General:  eldelry female,  Ill appearing . Awake, alert and oriented. Mucous Membranes:  Pink , anicteric  Neck: No JVD, no carotid bruit, no thyromegaly  Chest: Diminished breath sounds no wheezes rales or rhonchi  Cardiovascular: Regular rate and rhythm S1S2 heard, no murmurs or gallops  Abdomen:  Soft, undistended, non tender, no organomegaly, BS present  Extremities: No edema or cyanosis.  Distal pulses well felt  Neurological : chronic left UE and LE weakness  grossly normal        Scheduled Meds:   sodium chloride  20 mL Intravenous Once    remdesivir IVPB  200 mg Intravenous Once    Followed by   Verónica Dueñas ON 10/26/2020] remdesivir IVPB  100 mg Intravenous Q24H    levofloxacin  750 mg Intravenous Q48H    amLODIPine  5 mg Oral Daily    insulin lispro  0-12 Units Subcutaneous TID WC    insulin lispro  0-6 Units Subcutaneous Nightly    warfarin (COUMADIN) daily dosing (placeholder)   Other RX Placeholder    insulin lispro protamine & lispro  35 Units Subcutaneous BID WC    dexamethasone  6 mg Intravenous Q24H    vitamin B complex w/C  1 tablet Oral Daily    levothyroxine  112 mcg Oral cultures - sent - pending .   - Consulted pulmonology  Initiate treatment with remdesivir, transfuse 2 units of Covid convalescent plasma today( 10/25)    Thrombocytopenia   - acute, 132-> 140--> 125  - baseline ~180   - Likely 2/2 acute infection   -Monitor    DM2  - BG is elevated   - Humulin R at home - resumed and increase dose   - Continue SSI      Hypothyroidism  - Continue Synthroid     HTN  - BP is uncontrolled   - Continue lisinopril , increased home norvasc    Atrial fibrillation   - AC on Coumadin   -Tachycardic in the setting of high fevers. Monitor heart rate,  Coumadin held today due to elevated INR. Rectal bleeding  -In the setting of coagulopathy  -Monitor H&H    HLD  - Continue statin     DVT Prophylaxis: Coumadin   Diet: DIET CARB CONTROL;  Code Status: Full Code        Total time today 35 minutes. > 50 % time dominated by counseling/coordination of care. D/W Pt.  Consent obtained for CCP transfusion       Lexie Eduardo MD 10/25/2020 11:07 AM

## 2020-10-25 NOTE — PROGRESS NOTES
Informed consent verified for convalescent plasma at this time. Patient had a conversation with Dr. Kimberly Garcia prior to signing consent form for convalescent plasma.

## 2020-10-25 NOTE — FLOWSHEET NOTE
Pt A/Ox4. VSS. Pt unlabored, respirations even & easy. No distress noted. Shift assessment complete. See flowsheet. PM meds given. See MAR. Assisted pt to bedside commode & up to chair. Pt c/o of room being too hot & smothering. Room temp almost 77 degrees, set point 65. Thermostat not responding to set point. Called security to adjust temp. Denies further needs at this time. Chair alarm on. Chair wheels locked in place. Call light within reach. Will continue to monitor.        10/24/20 2021   Vital Signs   Temp 98.5 °F (36.9 °C)   Temp Source Oral   Pulse 88   Heart Rate Source Monitor   Resp 18   /65   BP Location Right upper arm   BP Upper/Lower Upper   Patient Position High fowlers   Level of Consciousness 0   MEWS Score 1   Patient Currently in Pain Denies   Pain Assessment   Pain Assessment 0-10   Pain Level 0   Oxygen Therapy   SpO2 95 %   O2 Device Nasal cannula   O2 Flow Rate (L/min) 2 L/min

## 2020-10-25 NOTE — PROGRESS NOTES
Spoke to Dr. Jg Oquendo regarding patient's temperature. He informed me that we could treat her temperature, wait till it comes down prior to starting the next bag of convalescent plasma. Patient is currently stable and tylenol given for fever of 101.2. Dr. Jg Oquendo stated that if patient develops another fever during administration of the next bag of convalescent plasma, to stop the infusion at that time.

## 2020-10-26 LAB
ALBUMIN SERPL-MCNC: 3.6 G/DL (ref 3.4–5)
ALP BLD-CCNC: 49 U/L (ref 40–129)
ALT SERPL-CCNC: 22 U/L (ref 10–40)
AST SERPL-CCNC: 27 U/L (ref 15–37)
BASOPHILS ABSOLUTE: 0 K/UL (ref 0–0.2)
BASOPHILS RELATIVE PERCENT: 0.1 %
BILIRUB SERPL-MCNC: 0.5 MG/DL (ref 0–1)
BILIRUBIN DIRECT: <0.2 MG/DL (ref 0–0.3)
BILIRUBIN, INDIRECT: ABNORMAL MG/DL (ref 0–1)
EOSINOPHILS ABSOLUTE: 0 K/UL (ref 0–0.6)
EOSINOPHILS RELATIVE PERCENT: 0 %
GLUCOSE BLD-MCNC: 183 MG/DL (ref 70–99)
GLUCOSE BLD-MCNC: 187 MG/DL (ref 70–99)
GLUCOSE BLD-MCNC: 211 MG/DL (ref 70–99)
GLUCOSE BLD-MCNC: 218 MG/DL (ref 70–99)
HCT VFR BLD CALC: 38 % (ref 36–48)
HEMOGLOBIN: 12.9 G/DL (ref 12–16)
INR BLD: 2.35 (ref 0.86–1.14)
LYMPHOCYTES ABSOLUTE: 0.6 K/UL (ref 1–5.1)
LYMPHOCYTES RELATIVE PERCENT: 10.5 %
MCH RBC QN AUTO: 29.9 PG (ref 26–34)
MCHC RBC AUTO-ENTMCNC: 33.8 G/DL (ref 31–36)
MCV RBC AUTO: 88.6 FL (ref 80–100)
MONOCYTES ABSOLUTE: 0.5 K/UL (ref 0–1.3)
MONOCYTES RELATIVE PERCENT: 7.9 %
NEUTROPHILS ABSOLUTE: 4.7 K/UL (ref 1.7–7.7)
NEUTROPHILS RELATIVE PERCENT: 81.5 %
PDW BLD-RTO: 13.8 % (ref 12.4–15.4)
PERFORMED ON: ABNORMAL
PLATELET # BLD: 129 K/UL (ref 135–450)
PMV BLD AUTO: 9.5 FL (ref 5–10.5)
PROTHROMBIN TIME: 27.5 SEC (ref 10–13.2)
RBC # BLD: 4.3 M/UL (ref 4–5.2)
TOTAL PROTEIN: 6.3 G/DL (ref 6.4–8.2)
URINE CULTURE, ROUTINE: NORMAL
WBC # BLD: 5.7 K/UL (ref 4–11)

## 2020-10-26 PROCEDURE — 80076 HEPATIC FUNCTION PANEL: CPT

## 2020-10-26 PROCEDURE — 97110 THERAPEUTIC EXERCISES: CPT

## 2020-10-26 PROCEDURE — 85610 PROTHROMBIN TIME: CPT

## 2020-10-26 PROCEDURE — 97116 GAIT TRAINING THERAPY: CPT

## 2020-10-26 PROCEDURE — 6370000000 HC RX 637 (ALT 250 FOR IP): Performed by: PHYSICIAN ASSISTANT

## 2020-10-26 PROCEDURE — 2580000003 HC RX 258

## 2020-10-26 PROCEDURE — 85025 COMPLETE CBC W/AUTO DIFF WBC: CPT

## 2020-10-26 PROCEDURE — 6360000002 HC RX W HCPCS: Performed by: INTERNAL MEDICINE

## 2020-10-26 PROCEDURE — 36430 TRANSFUSION BLD/BLD COMPNT: CPT

## 2020-10-26 PROCEDURE — 6370000000 HC RX 637 (ALT 250 FOR IP): Performed by: INTERNAL MEDICINE

## 2020-10-26 PROCEDURE — 99233 SBSQ HOSP IP/OBS HIGH 50: CPT | Performed by: INTERNAL MEDICINE

## 2020-10-26 PROCEDURE — 6370000000 HC RX 637 (ALT 250 FOR IP): Performed by: HOSPITALIST

## 2020-10-26 PROCEDURE — 2500000003 HC RX 250 WO HCPCS: Performed by: HOSPITALIST

## 2020-10-26 PROCEDURE — 97535 SELF CARE MNGMENT TRAINING: CPT

## 2020-10-26 PROCEDURE — 99232 SBSQ HOSP IP/OBS MODERATE 35: CPT | Performed by: INTERNAL MEDICINE

## 2020-10-26 PROCEDURE — 97530 THERAPEUTIC ACTIVITIES: CPT

## 2020-10-26 PROCEDURE — 1200000000 HC SEMI PRIVATE

## 2020-10-26 PROCEDURE — 97112 NEUROMUSCULAR REEDUCATION: CPT

## 2020-10-26 PROCEDURE — 36415 COLL VENOUS BLD VENIPUNCTURE: CPT

## 2020-10-26 PROCEDURE — 2580000003 HC RX 258: Performed by: HOSPITALIST

## 2020-10-26 PROCEDURE — 6360000002 HC RX W HCPCS: Performed by: HOSPITALIST

## 2020-10-26 RX ADMIN — B-COMPLEX W/ C & FOLIC ACID TAB 1 TABLET: TAB at 09:43

## 2020-10-26 RX ADMIN — AMLODIPINE BESYLATE 5 MG: 5 TABLET ORAL at 09:43

## 2020-10-26 RX ADMIN — Medication 10 ML: at 09:42

## 2020-10-26 RX ADMIN — CYANOCOBALAMIN TAB 500 MCG 1000 MCG: 500 TAB at 09:43

## 2020-10-26 RX ADMIN — REMDESIVIR 100 MG: 100 INJECTION, POWDER, LYOPHILIZED, FOR SOLUTION INTRAVENOUS at 09:44

## 2020-10-26 RX ADMIN — LEVOTHYROXINE SODIUM 112 MCG: 112 TABLET ORAL at 05:24

## 2020-10-26 RX ADMIN — INSULIN LISPRO 4 UNITS: 100 INJECTION, SOLUTION INTRAVENOUS; SUBCUTANEOUS at 17:38

## 2020-10-26 RX ADMIN — SODIUM CHLORIDE 250 ML: 9 INJECTION, SOLUTION INTRAVENOUS at 03:28

## 2020-10-26 RX ADMIN — INSULIN LISPRO 2 UNITS: 100 INJECTION, SOLUTION INTRAVENOUS; SUBCUTANEOUS at 20:07

## 2020-10-26 RX ADMIN — LISINOPRIL 40 MG: 20 TABLET ORAL at 09:42

## 2020-10-26 RX ADMIN — WARFARIN SODIUM 3 MG: 2 TABLET ORAL at 17:32

## 2020-10-26 RX ADMIN — Medication 10 ML: at 20:06

## 2020-10-26 RX ADMIN — LEVOFLOXACIN 750 MG: 5 INJECTION, SOLUTION INTRAVENOUS at 17:33

## 2020-10-26 RX ADMIN — MAGNESIUM GLUCONATE 500 MG ORAL TABLET 200 MG: 500 TABLET ORAL at 09:43

## 2020-10-26 RX ADMIN — DEXAMETHASONE SODIUM PHOSPHATE 6 MG: 10 INJECTION INTRAMUSCULAR; INTRAVENOUS at 17:33

## 2020-10-26 RX ADMIN — INSULIN LISPRO 2 UNITS: 100 INJECTION, SOLUTION INTRAVENOUS; SUBCUTANEOUS at 09:50

## 2020-10-26 RX ADMIN — ROSUVASTATIN CALCIUM 10 MG: 10 TABLET, FILM COATED ORAL at 09:43

## 2020-10-26 RX ADMIN — PANTOPRAZOLE SODIUM 40 MG: 40 TABLET, DELAYED RELEASE ORAL at 09:43

## 2020-10-26 NOTE — FLOWSHEET NOTE
10/26/20 1708   Vital Signs   Pulse 70   BP (!) 158/77   BP Location Right upper arm   Patient Position Up in chair   Level of Consciousness 0   Patient Currently in Pain Denies   follow up BP

## 2020-10-26 NOTE — FLOWSHEET NOTE
Convalescent plasma complete. No suspected transfusion reaction noted.        10/26/20 0617   Vital Signs   Temp 97.5 °F (36.4 °C)   Temp Source Oral   Pulse 62   Heart Rate Source Monitor   Resp 18   BP (!) 147/73   Oxygen Therapy   SpO2 96 %   O2 Device Nasal cannula   O2 Flow Rate (L/min) 2 L/min

## 2020-10-26 NOTE — PROGRESS NOTES
Pulmonary Progress Note    CC: COVID-19 pneumonia    Subjective:   2LPM O2   Doing better   No cough     IV line:      Intake/Output Summary (Last 24 hours) at 10/26/2020 0820  Last data filed at 10/26/2020 0617  Gross per 24 hour   Intake 2693.72 ml   Output 650 ml   Net 2043.72 ml       Exam:   BP (!) 147/73   Pulse 62   Temp 97.5 °F (36.4 °C) (Oral)   Resp 18   Ht 5' 5\" (1.651 m)   Wt 188 lb 14.4 oz (85.7 kg)   SpO2 96%   Breastfeeding No   BMI 31.43 kg/m²  on 2LPM   Gen: No distress. Alert. Eyes: PERRL. No sclera icterus. No conjunctival injection. ENT: No discharge. Pharynx clear. Neck: Trachea midline. Normal thyroid. Resp: + accessory muscle use. Few crackles. No wheezes. No rhonchi. No dullness on percussion. CV: Regular rate. Regular rhythm. No murmur or rub. No edema. GI: Non-tender. Non-distended. No masses. No organomegaly. Normal bowel sounds. No hernia. Skin: Warm and dry. No nodule on exposed extremities. No rash on exposed extremities. Lymph: No cervical LAD. No supraclavicular LAD. M/S: No cyanosis. No joint deformity. No clubbing. Neuro: Awake. Moves all extremities. CN grossly intact. Psych: Oriented x 3. No anxiety or agitation.      Scheduled Meds:   remdesivir IVPB  100 mg Intravenous Q24H    levofloxacin  750 mg Intravenous Q24H    amLODIPine  5 mg Oral Daily    insulin lispro  0-12 Units Subcutaneous TID     insulin lispro  0-6 Units Subcutaneous Nightly    warfarin (COUMADIN) daily dosing (placeholder)   Other RX Placeholder    insulin lispro protamine & lispro  35 Units Subcutaneous BID     dexamethasone  6 mg Intravenous Q24H    vitamin B complex w/C  1 tablet Oral Daily    levothyroxine  112 mcg Oral Daily    lisinopril  40 mg Oral Daily    magnesium oxide  200 mg Oral Daily    pantoprazole  40 mg Oral Daily    rosuvastatin  10 mg Oral Daily    vitamin B-12  1,000 mcg Oral Daily    sodium chloride flush  10 mL Intravenous 2 times per day Continuous Infusions:   dextrose       PRN Meds:  guaiFENesin-dextromethorphan, prochlorperazine, nitroGLYCERIN, sodium chloride flush, acetaminophen **OR** acetaminophen, polyethylene glycol, glucose, dextrose, glucagon (rDNA), dextrose    Labs:  CBC:   Recent Labs     10/24/20  0537 10/25/20  0626 10/26/20  0638   WBC 4.3 4.0 5.7   HGB 12.7 13.0 12.9   HCT 38.6 38.7 38.0   MCV 90.0 88.4 88.6   * 120* 129*     BMP:   Recent Labs     10/25/20  0626      K 4.3      CO2 20*   BUN 18   CREATININE 0.7     LIVER PROFILE:   Recent Labs     10/25/20  0626 10/26/20  0638   AST 23 27   ALT 20 22   BILIDIR <0.2 <0.2   BILITOT 0.4 0.5   ALKPHOS 51 49     PT/INR:   Recent Labs     10/24/20  0538 10/25/20  0626 10/26/20  0638   PROTIME 39.1* 40.6* 27.5*   INR 3.33* 3.46* 2.35*     APTT: No results for input(s): APTT in the last 72 hours. UA:  Recent Labs     10/24/20  2106   COLORU Yellow   PHUR 5.0   WBCUA 3-5   RBCUA 0-2   BACTERIA Rare*   CLARITYU Clear   SPECGRAV >=1.030   LEUKOCYTESUR Negative   UROBILINOGEN 0.2   BILIRUBINUR Negative   BLOODU TRACE-INTACT*   GLUCOSEU >=1000*     No results for input(s): PHART, XAS6SYL, PO2ART in the last 72 hours.   Cultures:   10/24 BC NGTD  10/24  NGTD       Films:  CXR 10/24 reviewed by me and showed  Low lung volumes with bilateral ASD         ASSESSMENT:  · Acute hypoxemic respiratory failure   · COVID-19 pneumonia  · History of CVA with right-sided weakness  · A. fib on Coumadin  · Coagulopathy        PLAN:  · Supplemental oxygen to maintain SaO2 >92%; wean as tolerated  · Droplet plus precautions  · Remdesivir day #2  · Post 2 units of CCP  · Decadron day #6  · Levaquin day #5  · Coumadin held for INR 3.46

## 2020-10-26 NOTE — PROGRESS NOTES
Occupational Therapy Daily Treatment Note    Unit: [facility]  Date:  10/26/2020  Patient Name:    Lisa Fontanez  Admitting diagnosis:  Pneumonia [J18.9]  Admit Date:  10/21/2020  Precautions/Restrictions:  fall risk, IV, bed/chair alarm and Droplet Plus precautions (+ COVID 19) hx of CAV with left side deficits        Discharge Recommendations: SNF  DME needs for discharge: defer to facility       Therapy recommendations for staff:   Assist of 1 (minimal assist) with use of rolling walker (RW) for all ambulation to/from bathroom    AM-PAC Score: AM-PAC Inpatient Daily Activity Raw Score: 13  Home Health S4 Level: Level 1- Standard       Treatment Time:  8:50-9:15  Treatment number: 3   Total Treatment Time:  25 minutes    History of Present Illness: 80 y.o. female with 3 days worsening sob / coker / fatigue / diarrhea. Her  was diagnosed with Covid19 3 weeks ago. She has had a cough productive of yellowish sputum, but has felt well until 3-4 days ago. She's noted progressive generalized weakness, non productive cough and sob/coker. She denies chest pain, leg edema, fever, chills, change in taste or smell, or headache. Subjective:  Patient reclined in chair upon therapist arrival. Patient agreeable to treatment. Pain   No  Rating: NA  Location:no complaints of pain during session  Pain Medicine Status: No request made      Bed Mobility:   Supine to Sit:  Not Tested  Sit to Supine:  Not Tested  Rolling:           Not Tested  Scooting:        Modified Independent    Transfer Training:   Sit to stand:   SBA  Stand to sit:  SBA  Bed to Chair:  Not Tested  Bed to [facility]:   Not Tested  Standard toilet:   CGA and with use of RW    Activity Tolerance   Pt completed therapy session with SOB noted with standing  SpO2: 94% at rest. 92% after functional mobility to bathroom and back. HR: 70s  BP:     ADL Training:   Upper body dressing:  Min A  Upper body bathing:  Min A  Lower body dressing:   Mod A  Lower body bathing: Mod A  Toileting:   SBA  Grooming/Hygiene:  setup    Therapeutic Exercise:   N/A    Patient Education:   Role of OT    Positioning Needs:   Up in chair, call light and needs in reach. Alarm Set    Family Present:  No    Assessment: Patient with good tolerance for treatment session. Continues to have general weakness and decreased activity tolerance for ADLs and mobility in room. Recommend continued acute OT to improve functional independence and endurance to return to prior to level of care. GOALS  To be met in 3 Visits:  1). Bed to toilet/BSC: Supervision     To be met in 5 Visits:  1). Supine to/from Sit:             SBA  2). Upper Body Bathing:         SBA  3). Lower Body Bathing:         Min A  4). Upper Body Dressing:       SBA  5). Lower Body Dressing:       Min A  6).  Pt to demonstrate UE exs x 15 reps with minimal cues         Plan: cont with [address] [facility], OTR/L #[id number]      If patient discharges from this facility prior to next visit, this note will serve as the Discharge Summary

## 2020-10-26 NOTE — FLOWSHEET NOTE
called Pt's room for telephonic  visit. Listened and offered emotional support. Pt in good spirits. Relieved that she is feeling better. Shared about importance of her renea.  acknowledged and affirmed Pt's emotions. Prayed with Pt for her and her . [address] Associate       10/26/20 1022   Encounter Summary   Services provided to: Patient   Referral/Consult From: [facility]   Support System Spouse   Continue Visiting   (10/26 initial call, sppt and prayer)   Complexity of Encounter Moderate   Length of Encounter 15 minutes   Spiritual Assessment Completed Yes   Spiritual/Buddhism   Type Spiritual support   Assessment Calm; Approachable;Coping; Hopeful   Intervention Active listening;Explored feelings, thoughts, concerns;Prayer;Discussed relationship with God;Discussed illness/injury and it's impact   Outcome Comfort;Expressed gratitude;Engaged in conversation;Expressed feelings/needs/concerns

## 2020-10-26 NOTE — PROGRESS NOTES
Pharmacy to Dose Warfarin     Dx: afib  Goal INR range 2-3   Home Warfarin dose:  7.5 mg on Tues, Thur, Sat     Date                 INR                  Warfarin  10-21              1.89                   no dose  10/22              1.93                   7.5mg  10/23              2.15                   7.5mg  10/24              3.33                   Hold  10/25              3.46                   Hold   10/26              2.35                   3 mg     IV Levaquin started 10/24. Recommend 3mg x 1. Daily INR ordered. Rx will continue to manage therapy per Dr. Fritz Arts consult order.         Dontrell Garcia PharmD Candidate [address] [place] [address] [id number]M 10/26/47763:30 PM  .

## 2020-10-26 NOTE — FLOWSHEET NOTE
10/26/20 0315   Vital Signs   Temp 97.3 °F (36.3 °C)   Temp Source Oral   Pulse 73   Resp 16   BP (!) 147/76   Oxygen Therapy   SpO2 95 %     1 unit convalescent plasma initiated at 0315, verified w/ [facility] RN. This RN monitored pt first 15 minutes after reaching the vein. No reaction transfusion reaction suspected, pt denies s/s. Pt comfortable. Educated pt to press call light & report s/s of reaction. Will continue to monitor. Convalescent plasma infusing at 75 ml/hr.

## 2020-10-26 NOTE — CARE COORDINATION
INTERDISCIPLINARY PLAN OF CARE CONFERENCE    Date/Time: 10/26/2020 4:30 PM  Completed by: Verónica Ruboi Case Management      Patient Name:  Alejandra Cancino  YOB: 1940  Admitting Diagnosis: Pneumonia [J18.9]     Admit Date/Time:  10/21/2020  2:26 PM    Chart reviewed. Interdisciplinary team contacted or reviewed plan related to patient progress and discharge plans. Disciplines included Case Management, Nursing, and Dietitian. Current Status: Stable  PT/OT recommendation for discharge plan of care: NF    Expected D/C Disposition:  Home  Confirmed plan with patient and/or family Yes confirmed with: (name) spouse    Discharge Plan Comments:  Reviewed chart and spoke with spouse via phone as he was dc from hospital today and also has covid. Discussed since he is also ill maybe pt needs SNF so she can recover and also so he can recover. Spouse declines and cont plan for home. Will cont to follow. Spouse states pt Son does live on premises and can assist as needed.     Home O2 in place on admit: No  Pt informed of need to bring portable home O2 tank on day of discharge for nursing to connect prior to leaving:  Not Indicated  Verbalized agreement/Understanding:  Not Indicated

## 2020-10-26 NOTE — PROGRESS NOTES
Shift assessment complete. See flow sheet. Scheduled meds given. See MAR. Call light and bedside table within reach. Pt currently on RA and has no complaints. No further needs noted at this time. Will continue to monitor.

## 2020-10-26 NOTE — PLAN OF CARE
transmission of infection  10/26/2020 0056 by Sherwin Shen RN  Outcome: Ongoing  10/25/2020 1122 by Grace Salcido RN  Outcome: Ongoing     Problem: Nutrition Deficits  Goal: Optimize nutrtional status  10/26/2020 0056 by Sherwin Shen RN  Outcome: Ongoing  10/25/2020 1122 by Grace Salcido RN  Outcome: Ongoing     Problem: Risk for Fluid Volume Deficit  Goal: Maintain normal heart rhythm  10/26/2020 0056 by Sherwin Shen RN  Outcome: Ongoing  10/25/2020 1122 by Grace Salcido RN  Outcome: Ongoing  Goal: Maintain absence of muscle cramping  10/26/2020 0056 by Sherwin Shen RN  Outcome: Ongoing  10/25/2020 1122 by Grace Salcido RN  Outcome: Ongoing  Goal: Maintain normal serum potassium, sodium, calcium, phosphorus, and pH  10/26/2020 0056 by Sherwin Shen RN  Outcome: Ongoing  10/25/2020 1122 by Grace Salcido RN  Outcome: Ongoing     Problem: Loneliness or Risk for Loneliness  Goal: Demonstrate positive use of time alone when socialization is not possible  10/26/2020 0056 by Sherwin Shen RN  Outcome: Ongoing  10/25/2020 1122 by Grace Salcido RN  Outcome: Ongoing     Problem: Fatigue  Goal: Verbalize increase energy and improved vitality  10/26/2020 0056 by Sherwin Shen RN  Outcome: Ongoing  10/25/2020 1122 by Grace Salcido RN  Outcome: Ongoing     Problem: Patient Education: Go to Patient Education Activity  Goal: Patient/Family Education  10/26/2020 0056 by Sherwin Shen RN  Outcome: Ongoing  10/25/2020 1122 by Grace Salcido RN  Outcome: Ongoing     Problem: Pain:  Goal: Pain level will decrease  Description: Pain level will decrease  10/26/2020 0056 by Sherwin Shen RN  Outcome: Ongoing  10/25/2020 1122 by Grace Salcido RN  Outcome: Ongoing  Goal: Control of acute pain  Description: Control of acute pain  10/26/2020 0056 by Sherwin Shen RN  Outcome: Ongoing  10/25/2020 1122 by Grace Salcido RN  Outcome: Ongoing  Goal: Control of chronic pain  Description: Control of chronic pain  10/26/2020 0056 by Christine Chung RN  Outcome: Ongoing  10/25/2020 1122 by Raymond Nova RN  Outcome: Ongoing

## 2020-10-26 NOTE — PROGRESS NOTES
Progress Note    Admit Date:  10/21/2020    Pt with  COVID-19 PNA   Her fevers are resolving    Subjective:  Ms. Lee Arthur looks a little better today,  she is afebrile . Pulmonology consulted for COVID-19 pneumonia with persistent fevers . Plan is to start remdesivir and initiate CCP plus blood transfusions . I discussed with patient . Appears ill . Oxygen sat stable on 2 L      Objective:   Patient Vitals for the past 4 hrs:   BP Temp Temp src Pulse Resp SpO2   10/26/20 0617 (!) 147/73 97.5 °F (36.4 °C) Oral 62 18 96 %       Intake/Output Summary (Last 24 hours) at 10/26/2020 0746  Last data filed at 10/26/2020 0617  Gross per 24 hour   Intake 2693.72 ml   Output 650 ml   Net 2043.72 ml       Physical Exam:  General:  eldelry female,  Ill appearing . Awake, alert and oriented. Mucous Membranes:  Pink , anicteric  Neck: No JVD, no carotid bruit, no thyromegaly  Chest: Diminished breath sounds no wheezes rales or rhonchi  Cardiovascular: Regular rate and rhythm S1S2 heard, no murmurs or gallops  Abdomen:  Soft, undistended, non tender, no organomegaly, BS present  Extremities: No edema or cyanosis.  Distal pulses well felt  Neurological : chronic left UE and LE weakness  grossly normal        Scheduled Meds:   remdesivir IVPB  100 mg Intravenous Q24H    levofloxacin  750 mg Intravenous Q24H    amLODIPine  5 mg Oral Daily    insulin lispro  0-12 Units Subcutaneous TID WC    insulin lispro  0-6 Units Subcutaneous Nightly    warfarin (COUMADIN) daily dosing (placeholder)   Other RX Placeholder    insulin lispro protamine & lispro  35 Units Subcutaneous BID WC    dexamethasone  6 mg Intravenous Q24H    vitamin B complex w/C  1 tablet Oral Daily    levothyroxine  112 mcg Oral Daily    lisinopril  40 mg Oral Daily    magnesium oxide  200 mg Oral Daily    pantoprazole  40 mg Oral Daily    rosuvastatin  10 mg Oral Daily    vitamin B-12  1,000 mcg Oral Daily    sodium chloride flush  10 mL Intravenous 2 times per day       Continuous Infusions:   dextrose         PRN Meds:  guaiFENesin-dextromethorphan, prochlorperazine, nitroGLYCERIN, sodium chloride flush, acetaminophen **OR** acetaminophen, polyethylene glycol, glucose, dextrose, glucagon (rDNA), dextrose      Data:  CBC:   Recent Labs     10/24/20  0537 10/25/20  0626 10/26/20  0638   WBC 4.3 4.0 5.7   HGB 12.7 13.0 12.9   HCT 38.6 38.7 38.0   MCV 90.0 88.4 88.6   * 120* 129*     BMP:   Recent Labs     10/25/20  0626      K 4.3      CO2 20*   BUN 18   CREATININE 0.7     LIVER PROFILE:   Recent Labs     10/25/20  0626 10/26/20  0638   AST 23 27   ALT 20 22   BILIDIR <0.2 <0.2   BILITOT 0.4 0.5   ALKPHOS 51 49     PT/INR:   Recent Labs     10/24/20  0538 10/25/20  0626 10/26/20  0638   PROTIME 39.1* 40.6* 27.5*   INR 3.33* 3.46* 2.35*       CULTURES  None      RADIOLOGY  XR CHEST PORTABLE   Final Result   There has been development of a small focus of airspace disease the left lung   base, pneumonia versus edema versus atelectasis. There appears to be a background of pulmonary vascular congestion, suggesting   pulmonary edema. XR CHEST PORTABLE   Final Result   Hypoinflated lungs with patchy perihilar and bibasilar opacities which may be   on the basis of edema or infection in the appropriate clinical setting. Probable trace right pleural effusion. Assessment/Plan:    COVID-19 infection   Acute febrile illness  Acute hypoxic resp failure with shortness of breath,increased RR  - testing done at PCP office showed positive COVID test    -Cannot rule out superimposed bacterial pneumonia  -She has persistent high fevers, hypoxic requiring 2 L of O2  - Continue Decadron   - CXR with bibasilar opacities   - Continue DROPLET + precautions   - Started the patient on Levaquin. cont day 3  -  blood cultures and urine cultures - sent - pending .   - Consulted pulmonology  Initiated treatment with remdesivir day 2 , transfused 2 units of Covid convalescent plasma 10/25    Thrombocytopenia   - acute, 132-> 140--> 125--129  - baseline ~180   - Likely 2/2 acute infection   -Monitor    DM2  - BG is elevated   - Humulin R at home - resumed and increase dose   - Continue SSI      Hypothyroidism  - Continue Synthroid     HTN  - BP is uncontrolled   - Continue lisinopril , increased home norvasc    Atrial fibrillation   - AC on Coumadin   -Tachycardic in the setting of high fevers.    Monitor heart rate,  Pharmacy to monitor    Rectal bleeding  -In the setting of coagulopathy  -Monitor H&H  stable    HLD  - Continue statin     DVT Prophylaxis: Coumadin   Diet: DIET CARB CONTROL;  Code Status: Full Code           Ida Wolfe MD 10/26/2020 7:46 AM

## 2020-10-26 NOTE — PROGRESS NOTES
Physical Therapy  Inpatient Physical Therapy Daily Treatment Note    Unit: [facility]  Date:  10/26/2020  Patient Name:    Cristy Riggs  Admitting diagnosis:  Pneumonia [J18.9]  Admit Date:  10/21/2020  Precautions/Restrictions:  Fall risk, Bed/chair alarm, Lines -IV and Isolation Precautions: Droplet Plus - COVID      Discharge Recommendations: SNF  DME needs for discharge: defer to facility       Therapy recommendation for EMS Transport: can transport by wheelchair    Therapy recommendations for staff:   Assist of 1 with use of rolling walker (RW) and gait belt for all transfers and ambulation within room. History of Present Illness: (H & P taken from [facility] Yun MD's note dated 10/21/2020)  80 y.o. female with 3 days worsening sob / coker / fatigue / diarrhea. Her  was diagnosed with Covid19 3 weeks ago. She has had a cough productive of yellowish sputum, but has felt well until 3-4 days ago. She's noted progressive generalized weakness, non productive cough and sob/coker. She denies chest pain, leg edema, fever, chills, change in taste or smell, or headache. Home Health S4 Level Recommendation: Level 3 Safety  AM-PAC Mobility Score   AM-PAC Inpatient Mobility Raw Score : 17  AM-PAC Inpatient Mobility without Stair Climbing Raw Score : 14    Treatment Time: 1023 - 1105   Treatment number: 4  Timed Code Treatment Minutes: 42 minutes  Total Treatment Minutes:  42 minutes    Cognition    A&O x4   Able to follow 1 step commands    Subjective  Patient sitting up in chair with no family present   Pt agreeable to this PT tx.      Pain   No  Location: n/a  Rating:    NA/10  Pain Medicine Status: No request made     Bed Mobility   Supine to Sit:    Not Tested  Sit to Supine:   Not Tested  Rolling:   Not Tested  Scooting:   SBA    Transfer Training     Sit to stand:   CGA from chair  Stand to sit:   SBA  Bed to Chair:   CGA with use of gait belt and rolling walker (RW)     Gait Training gait completed as indicated below  Distance:      30 ft  Deviations (firm surface/linoleum):  decreased thao, increased DOLORES, forward flexed posture, decreased step length on left, decreased stance time on left and poor LLE advancement due to 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 Pt unable to dorsiflex to achieve L foot clearance. Pt stands with trunk/pelvic rotation to L side to enable L leg advancement with adduction to compensate for hip flexion weakness. Assistive Device Used:    rolling walker (RW)  Level of Assist:    CGA   Comment: Patient needed VC with tactile cueing for facilitating the LLE advancement and hip-knee flexion during ambulation, safety and sequencing during change in direction during ambulation. Stair Training deferred, pt does not have stairs in the home environment    Therapeutic Exercise all completed bilaterally unless indicated  Ankle Pumps x 10 reps  LAQ x 10 reps  marching x 10 reps  Patient was given passive ROM with gentle stretching for the L plantarflexors for 30 sec x 3 reps. In unsupported standing multiple direction reach outs for 10 reps x 2 sets with CGA. Sit to stand reps with SBA for 10 reps. Balance  Sitting (static):  Good -    Comments: VC needed to correct L sided trunk lean. Standing: Fair +; CGA  Comments: Pt stands with decreased weight bearing on LLE due to baseline weakness. Able to stand with unilateral UE support to use adjust LUE on RW handle, CGA for dynamic balance activities. Patient Education      Role of PT, POC, Discharge recommendations, safety awareness, transfer techniques, pursed lip breathing and calling for assist with mobility. Positioning Needs       Pt up in chair, alarm set, positioned in proper neutral alignment and pressure relief provided. Call light provided and all needs within reach     Activity Tolerance   Pt completed therapy session with SOB noted with activity. SpO2: 95% at rest. With activity 92% recovery within 1 min. Other  None. Assessment :  Patient demonstrated improved standing tolerance today with improved dynamic standing balance from Fair- to Fair +, needing decreased level of physical assistance from [facility] to [facility]. Recommending SNF upon discharge as patient functioning well below baseline, demonstrates good rehab potential and unable to return home due to burden of care beyond caregiver ability, home environment not conducive to patient recovery and limited safety awareness. Goals (all goals ongoing unless otherwise indicated)  To be met in 3 visits:  1). Independent with LE Ex x 10 reps     To be met in 6 visits:  1). Supine to/from sit: SBA  2). Sit to/from stand: SBA  3). Bed to chair: SBA  4). Gait: Ambulate 150 ft.  with  SBA and use of LRAD (least restrictive assistive device)  5). Tolerate B LE exercises 3 sets of 10-15 reps    Plan   Continue with plan of care. Signature: Yoselyn Mensah, MS PT, # [id number]      If patient discharges from this facility prior to next visit, this note will serve as the Discharge Summary.

## 2020-10-26 NOTE — FLOWSHEET NOTE
10/25/20 2249   Vital Signs   Temp 97.4 °F (36.3 °C)   Temp Source Oral   Pulse 77   Heart Rate Source Monitor   Resp 18   BP (!) 142/74   BP Location Right upper arm   Patient Position High fowlers   Level of Consciousness 0   MEWS Score 1   Oxygen Therapy   SpO2 95 %   O2 Device Nasal cannula   O2 Flow Rate (L/min) 2 L/min       Pt A/Ox4. VSS. Pt unlabored, respirations even & easy. Diminshed LS. No distress noted. Shift assessment complete. See flowsheet. PM meds given. See MAR. Denies needs at this time. Bed alarm on. Bed in low position. Call light within reach. Will continue to monitor.

## 2020-10-26 NOTE — PLAN OF CARE
Problem: Falls - Risk of:  Goal: Will remain free from falls  Description: Will remain free from falls  42/71/2258 3914 by Michell Vigil RN  Outcome: Ongoing  10/26/2020 0056 by Jenni Reid RN  Outcome: Ongoing  Goal: Absence of physical injury  Description: Absence of physical injury  37/89/7359 9158 by Michell Vigil RN  Outcome: Ongoing  10/26/2020 0056 by Jenni Reid RN  Outcome: Ongoing     Problem: Airway Clearance - Ineffective  Goal: Achieve or maintain patent airway  20/32/3092 6787 by Michell Vigil RN  Outcome: Ongoing  10/26/2020 0056 by Jenni Reid RN  Outcome: Ongoing     Problem: Gas Exchange - Impaired  Goal: Absence of hypoxia  11/72/5553 2156 by Michell Vigil RN  Outcome: Ongoing  10/26/2020 0056 by Jenni Reid RN  Outcome: Ongoing  Goal: Promote optimal lung function  84/11/6981 0078 by Michell Vigil RN  Outcome: Ongoing  10/26/2020 0056 by Jenni Reid RN  Outcome: Ongoing     Problem: Breathing Pattern - Ineffective  Goal: Ability to achieve and maintain a regular respiratory rate  21/77/9298 4471 by Michell Vigil RN  Outcome: Ongoing  10/26/2020 0056 by Jenni Reid RN  Outcome: Ongoing     Problem:  Body Temperature -  Risk of, Imbalanced  Goal: Ability to maintain a body temperature within defined limits  94/57/8680 6525 by Michell Vigil RN  Outcome: Ongoing  10/26/2020 0056 by Jenni Reid RN  Outcome: Ongoing  Goal: Will regain or maintain usual level of consciousness  80/71/1874 8245 by Michell Vigil RN  Outcome: Ongoing  10/26/2020 0056 by Jenni Reid RN  Outcome: Ongoing  Goal: Complications related to the disease process, condition or treatment will be avoided or minimized  96/31/0834 5735 by Michell Vigil RN  Outcome: Ongoing  10/26/2020 0056 by Jenni Reid RN  Outcome: Ongoing     Problem: Isolation Precautions - Risk of Spread of Infection  Goal: Prevent transmission of infection  50/78/7487 2384 by Michell Vigil RN  Outcome: Ongoing  10/26/2020 0056 by Todd Rivas RN  Outcome: Ongoing     Problem: Nutrition Deficits  Goal: Optimize nutrtional status  66/31/8578 9722 by Miguel A Sheikh RN  Outcome: Ongoing  10/26/2020 0056 by Todd Rivas RN  Outcome: Ongoing     Problem: Risk for Fluid Volume Deficit  Goal: Maintain normal heart rhythm  54/31/0327 8991 by Miguel A Sheikh RN  Outcome: Ongoing  10/26/2020 0056 by Todd Rivas RN  Outcome: Ongoing  Goal: Maintain absence of muscle cramping  39/97/8885 7911 by Miguel A Sheikh RN  Outcome: Ongoing  10/26/2020 0056 by Todd Rivas RN  Outcome: Ongoing  Goal: Maintain normal serum potassium, sodium, calcium, phosphorus, and pH  13/16/0739 6939 by Miguel A Sheikh RN  Outcome: Ongoing  10/26/2020 0056 by Todd Rivas RN  Outcome: Ongoing     Problem: Loneliness or Risk for Loneliness  Goal: Demonstrate positive use of time alone when socialization is not possible  10/46/9746 4464 by Miguel A Sheikh RN  Outcome: Ongoing  10/26/2020 0056 by Todd Rivas RN  Outcome: Ongoing     Problem: Fatigue  Goal: Verbalize increase energy and improved vitality  06/86/4101 9358 by Miguel A Sheikh RN  Outcome: Ongoing  10/26/2020 0056 by Todd Rivas RN  Outcome: Ongoing     Problem: Patient Education: Go to Patient Education Activity  Goal: Patient/Family Education  60/77/5410 7030 by Miguel A Sheikh RN  Outcome: Ongoing  10/26/2020 0056 by Todd Rivas RN  Outcome: Ongoing     Problem: Pain:  Goal: Pain level will decrease  Description: Pain level will decrease  65/57/6875 6093 by Miguel A Sheikh RN  Outcome: Ongoing  10/26/2020 0056 by Todd Rivas RN  Outcome: Ongoing  Goal: Control of acute pain  Description: Control of acute pain  71/68/0965 1424 by Miguel A Sheikh RN  Outcome: Ongoing  10/26/2020 0056 by Todd Rivas RN  Outcome: Ongoing  Goal: Control of chronic pain  Description: Control of chronic pain  90/18/3311 0084 by Miguel A Sheikh RN  Outcome: Ongoing  10/26/2020 0056 by Gilberto Mcmahon RN  Outcome: Ongoing     Problem: Skin Integrity:  Goal: Will show no infection signs and symptoms  Description: Will show no infection signs and symptoms  Outcome: Ongoing  Goal: Absence of new skin breakdown  Description: Absence of new skin breakdown  Outcome: Ongoing

## 2020-10-26 NOTE — FLOWSHEET NOTE
10/26/20 1406   Vital Signs   Temp 97.2 °F (36.2 °C)   Temp Source Oral   Pulse 67   Heart Rate Source Monitor   Resp 16   BP (!) 186/83   BP Location Right upper arm   BP Upper/Lower Upper   Patient Position Up in chair   Level of Consciousness 0   MEWS Score 1   Patient Currently in Pain Denies   Oxygen Therapy   SpO2 93 %   O2 Device None (Room air)   MD made aware of BP.

## 2020-10-27 VITALS
BODY MASS INDEX: 31.47 KG/M2 | DIASTOLIC BLOOD PRESSURE: 87 MMHG | WEIGHT: 188.9 LBS | TEMPERATURE: 97.1 F | HEART RATE: 64 BPM | OXYGEN SATURATION: 93 % | SYSTOLIC BLOOD PRESSURE: 176 MMHG | RESPIRATION RATE: 16 BRPM | HEIGHT: 65 IN

## 2020-10-27 LAB
A/G RATIO: 1.2 (ref 1.1–2.2)
ALBUMIN SERPL-MCNC: 3.5 G/DL (ref 3.4–5)
ALP BLD-CCNC: 53 U/L (ref 40–129)
ALT SERPL-CCNC: 23 U/L (ref 10–40)
ANION GAP SERPL CALCULATED.3IONS-SCNC: 8 MMOL/L (ref 3–16)
AST SERPL-CCNC: 25 U/L (ref 15–37)
BASOPHILS ABSOLUTE: 0 K/UL (ref 0–0.2)
BASOPHILS RELATIVE PERCENT: 0.1 %
BILIRUB SERPL-MCNC: 0.4 MG/DL (ref 0–1)
BUN BLDV-MCNC: 17 MG/DL (ref 7–20)
CALCIUM SERPL-MCNC: 8.5 MG/DL (ref 8.3–10.6)
CHLORIDE BLD-SCNC: 99 MMOL/L (ref 99–110)
CO2: 23 MMOL/L (ref 21–32)
CREAT SERPL-MCNC: 0.6 MG/DL (ref 0.6–1.2)
EOSINOPHILS ABSOLUTE: 0 K/UL (ref 0–0.6)
EOSINOPHILS RELATIVE PERCENT: 0.1 %
GFR AFRICAN AMERICAN: >60
GFR NON-AFRICAN AMERICAN: >60
GLOBULIN: 2.9 G/DL
GLUCOSE BLD-MCNC: 217 MG/DL (ref 70–99)
GLUCOSE BLD-MCNC: 229 MG/DL (ref 70–99)
GLUCOSE BLD-MCNC: 245 MG/DL (ref 70–99)
HCT VFR BLD CALC: 40.6 % (ref 36–48)
HEMOGLOBIN: 13.5 G/DL (ref 12–16)
INR BLD: 2.49 (ref 0.86–1.14)
LYMPHOCYTES ABSOLUTE: 0.6 K/UL (ref 1–5.1)
LYMPHOCYTES RELATIVE PERCENT: 16.7 %
MCH RBC QN AUTO: 29.8 PG (ref 26–34)
MCHC RBC AUTO-ENTMCNC: 33.1 G/DL (ref 31–36)
MCV RBC AUTO: 90 FL (ref 80–100)
MONOCYTES ABSOLUTE: 0.4 K/UL (ref 0–1.3)
MONOCYTES RELATIVE PERCENT: 11.7 %
NEUTROPHILS ABSOLUTE: 2.6 K/UL (ref 1.7–7.7)
NEUTROPHILS RELATIVE PERCENT: 71.4 %
PDW BLD-RTO: 13.9 % (ref 12.4–15.4)
PERFORMED ON: ABNORMAL
PERFORMED ON: ABNORMAL
PLATELET # BLD: 148 K/UL (ref 135–450)
PMV BLD AUTO: 9.5 FL (ref 5–10.5)
POTASSIUM SERPL-SCNC: 3.6 MMOL/L (ref 3.5–5.1)
PROTHROMBIN TIME: 29.2 SEC (ref 10–13.2)
RBC # BLD: 4.52 M/UL (ref 4–5.2)
REPORT: NORMAL
SODIUM BLD-SCNC: 130 MMOL/L (ref 136–145)
TOTAL PROTEIN: 6.4 G/DL (ref 6.4–8.2)
WBC # BLD: 3.6 K/UL (ref 4–11)

## 2020-10-27 PROCEDURE — 85610 PROTHROMBIN TIME: CPT

## 2020-10-27 PROCEDURE — 80053 COMPREHEN METABOLIC PANEL: CPT

## 2020-10-27 PROCEDURE — 85025 COMPLETE CBC W/AUTO DIFF WBC: CPT

## 2020-10-27 PROCEDURE — 99239 HOSP IP/OBS DSCHRG MGMT >30: CPT | Performed by: INTERNAL MEDICINE

## 2020-10-27 PROCEDURE — 99233 SBSQ HOSP IP/OBS HIGH 50: CPT | Performed by: INTERNAL MEDICINE

## 2020-10-27 PROCEDURE — 36415 COLL VENOUS BLD VENIPUNCTURE: CPT

## 2020-10-27 PROCEDURE — 6370000000 HC RX 637 (ALT 250 FOR IP): Performed by: HOSPITALIST

## 2020-10-27 PROCEDURE — 2580000003 HC RX 258: Performed by: HOSPITALIST

## 2020-10-27 PROCEDURE — 6370000000 HC RX 637 (ALT 250 FOR IP): Performed by: INTERNAL MEDICINE

## 2020-10-27 RX ORDER — AMLODIPINE BESYLATE 5 MG/1
5 TABLET ORAL DAILY
Qty: 30 TABLET | Refills: 0 | Status: ON HOLD | OUTPATIENT
Start: 2020-10-27 | End: 2020-11-27

## 2020-10-27 RX ADMIN — PANTOPRAZOLE SODIUM 40 MG: 40 TABLET, DELAYED RELEASE ORAL at 09:33

## 2020-10-27 RX ADMIN — ROSUVASTATIN CALCIUM 10 MG: 10 TABLET, FILM COATED ORAL at 09:33

## 2020-10-27 RX ADMIN — LISINOPRIL 40 MG: 20 TABLET ORAL at 09:33

## 2020-10-27 RX ADMIN — INSULIN LISPRO 4 UNITS: 100 INJECTION, SOLUTION INTRAVENOUS; SUBCUTANEOUS at 09:36

## 2020-10-27 RX ADMIN — CYANOCOBALAMIN TAB 500 MCG 1000 MCG: 500 TAB at 09:33

## 2020-10-27 RX ADMIN — Medication 10 ML: at 09:35

## 2020-10-27 RX ADMIN — MAGNESIUM GLUCONATE 500 MG ORAL TABLET 200 MG: 500 TABLET ORAL at 09:33

## 2020-10-27 RX ADMIN — B-COMPLEX W/ C & FOLIC ACID TAB 1 TABLET: TAB at 09:33

## 2020-10-27 RX ADMIN — LEVOTHYROXINE SODIUM 112 MCG: 112 TABLET ORAL at 05:55

## 2020-10-27 RX ADMIN — AMLODIPINE BESYLATE 5 MG: 5 TABLET ORAL at 09:33

## 2020-10-27 NOTE — PROGRESS NOTES
4 Eyes Skin Assessment     The patient is being assess for   Low jeet    I agree that I have performed a thorough Head to Toe Skin Assessment on the patient. ALL assessment sites listed below have been assessed. COVID POSITIVE  Areas assessed by both nurses:   [x]   Head, Face, and Ears   [x]   Shoulders, Back, and Chest, Abdomen  [x]   Arms, Elbows, and Hands   [x]   Coccyx, Sacrum, and Ischium  [x]   Legs, Feet, and Heels        Scattered scabs/bruising  Red but blanchable bottom    **SHARE this note so that the co-signing nurse is able to place an eSignature**      Does the Patient have Skin Breakdown?   No          Jeet Prevention initiated:  No   Wound Care Orders initiated:  No      WOC nurse consulted for Pressure Injury (Stage 3,4, Unstageable, DTI, NWPT, Complex wounds)and New or Established Ostomies:  No      Primary Nurse eSignature: Electronically signed by Yoel Gil RN on 10/82/55 at 10:28 AM EDT

## 2020-10-27 NOTE — PROGRESS NOTES
Handoff report and transfer of care given to [facility]. Pt in stable condition. Call light within reach. Bed locked in lowest position. Denies further needs at this time.

## 2020-10-27 NOTE — DISCHARGE SUMMARY
Continue statin     Procedures (Please Review Full Report for Details)  None     Consults    Pulmonology    Physical Exam at Discharge:    BP (!) 176/87   Pulse 64   Temp 97.1 °F (36.2 °C) (Oral)   Resp 16   Ht 5' 5\" (1.651 m)   Wt 188 lb 14.4 oz (85.7 kg)   SpO2 93%   Breastfeeding No   BMI 31.43 kg/m²     General:  eldelry female,  Ill appearing . Awake, alert and oriented. Mucous Membranes:  Pink , anicteric  Neck: No JVD, no carotid bruit, no thyromegaly  Chest: Diminished breath sounds no wheezes rales or rhonchi  Cardiovascular: Regular rate and rhythm S1S2 heard, no murmurs or gallops  Abdomen:  Soft, undistended, non tender, no organomegaly, BS present  Extremities: No edema or cyanosis. Distal pulses well felt  Neurological : chronic left UE and LE weakness  grossly normal    CBC:   Recent Labs     10/25/20  0626 10/26/20  0638 10/27/20  0651   WBC 4.0 5.7 3.6*   HGB 13.0 12.9 13.5   HCT 38.7 38.0 40.6   MCV 88.4 88.6 90.0   * 129* 148     BMP:   Recent Labs     10/25/20  0626 10/27/20  0651    130*   K 4.3 3.6    99   CO2 20* 23   BUN 18 17   CREATININE 0.7 0.6     LIVER PROFILE:   Recent Labs     10/25/20  0626 10/26/20  0638 10/27/20  0651   AST 23 27 25   ALT 20 22 23   BILIDIR <0.2 <0.2  --    BILITOT 0.4 0.5 0.4   ALKPHOS 51 49 53     PT/INR:   Recent Labs     10/25/20  0626 10/26/20  0638 10/27/20  0651   PROTIME 40.6* 27.5* 29.2*   INR 3.46* 2.35* 2.49*     UA:  Recent Labs     10/24/20  2106   COLORU Yellow   PHUR 5.0   WBCUA 3-5   RBCUA 0-2   BACTERIA Rare*   CLARITYU Clear   SPECGRAV >=1.030   LEUKOCYTESUR Negative   UROBILINOGEN 0.2   BILIRUBINUR Negative   BLOODU TRACE-INTACT*   GLUCOSEU >=1000*     CULTURES  Urine Cx: negative   Blood Cx: NGTD    RADIOLOGY  XR CHEST PORTABLE   Final Result   There has been development of a small focus of airspace disease the left lung   base, pneumonia versus edema versus atelectasis.       There appears to be a background of pulmonary vascular congestion, suggesting   pulmonary edema. XR CHEST PORTABLE   Final Result   Hypoinflated lungs with patchy perihilar and bibasilar opacities which may be   on the basis of edema or infection in the appropriate clinical setting. Probable trace right pleural effusion.              Discharge Medications     Medication List      CHANGE how you take these medications    amLODIPine 5 MG tablet  Commonly known as:  NORVASC  Take 1 tablet by mouth daily  What changed:    · medication strength  · how much to take        CONTINUE taking these medications    b complex vitamins capsule     Fish Oil 1200 MG Caps     FreeStyle Gilma 14 Day Sensor Misc  2 Units by Does not apply route every 14 days     HumuLIN 70/30 (70-30) 100 UNIT per ML injection vial  Generic drug:  insulin 70-30     insulin regular 100 UNIT/ML injection  Commonly known as:  HUMULIN R;NOVOLIN R     INSULIN SYRINGE 1CC/30GX5/16\" 30G X 5/16\" 1 ML Misc  1 each by Does not apply route 3 times daily     levothyroxine 112 MCG tablet  Commonly known as:  SYNTHROID     lisinopril 40 MG tablet  Commonly known as:  PRINIVIL;ZESTRIL     magnesium 250 MG Tabs tablet  Commonly known as:  MAGNESIUM-OXIDE     nitroGLYCERIN 0.4 MG SL tablet  Commonly known as:  Nitrostat  Place 1 tablet under the tongue every 5 minutes as needed for Chest pain     nystatin 962470 UNIT/GM cream  Commonly known as:  MYCOSTATIN     rosuvastatin 10 MG tablet  Commonly known as:  CRESTOR  Take 1 tablet by mouth daily     vitamin B-12 1000 MCG tablet  Commonly known as:  CYANOCOBALAMIN     warfarin 7.5 MG tablet  Commonly known as:  COUMADIN        STOP taking these medications    pantoprazole 40 MG tablet  Commonly known as:  PROTONIX           Where to Get Your Medications      [phone] [facility] [phone] [address]  [address] [facility]    Phone:  [phone] · amLODIPine 5 MG tablet       Discharged in stable condition to home, patient refused SNF    Follow Up: Follow up with PCP, will need to quarantine     Total time spent on discharge is 35 minutes    KRISTIN Raymond.

## 2020-10-27 NOTE — PROGRESS NOTES
Pharmacy to Dose Warfarin     Dx: afib  Goal INR range 2-3   Home Warfarin dose:  7.5 mg on Tues, Thur, Sat     Date                 INR                  Warfarin  10-21              1.89                   no dose  10/22              1.93                   7.5mg  10/23              2.15                   7.5mg  10/24              3.33                   Hold  10/25              3.46                   Hold   10/26              2.35                   3 mg  10/27              2.49                   3 mg     IV Levaquin started 10/24. Recommend 3mg x 1. Daily INR ordered. Rx will continue to manage therapy per Dr. June Hatch consult order.         Maddy Long PharmD Candidate [address] [place] [address] [id number] Shirley HAMM 10/27/161021:43 PM  .

## 2020-10-27 NOTE — CARE COORDINATION
DISCHARGE ORDER  Date/Time 10/27/2020 2:17 PM  Completed by: Cassandra Pastor, Case Management    Patient Name: Brad Rodríguez      : 1940  Admitting Diagnosis: Pneumonia [J18.9]      Admit order Date and Status: 10/21/20 inpt  (verify MD's last order for status of admission)      Noted discharge order. If applicable PT/OT recommendation at Discharge: SNF  DME recommendation by PT/OT:  Confirmed discharge plan  : Yes  with whom patient via phone and spouse via phone  If pt confirmed DC plan does family need to be contacted by CM Yes if yes who spouse  Discharge Plan:  Order for dc noted. Spoke with pt spouse via phone and plan remains for home. Cont to decline HHC. Speaking with spouse pt sounds to be at baseline and spouse has been caring for her for several years. States pt son is there and can assist if needed. Chart reviewed and no other dc needs identified. Reviewed chart. Role of discharge planner explained and patient verbalized understanding. Discharge order is noted. Has Home O2 in place on admit:  No  Informed of need to bring portable home O2 tank on day of discharge for nursing to connect prior to leaving:   Not Indicated  Verbalized agreement/Understanding:   Not Indicated  Pt is being d/c'd to home today. Pt's O2 sats are 93% on RA. Discharge timeout done with nsg, Cm and pt and spouse. All discharge needs and concerns addressed.

## 2020-10-27 NOTE — PROGRESS NOTES
Progress Note    Admit Date:  10/21/2020    Pt with  COVID-19 PNA   Her fevers are resolving    Subjective:  Ms. Giulia Umaña looks a little better today,  she is afebrile . Pulmonology consulted for COVID-19 pneumonia with persistent fevers . Plan is to start remdesivir and initiate CCP plus blood transfusions . I discussed with patient . Now on RA       Objective:   Patient Vitals for the past 4 hrs:   BP Temp Temp src Pulse Resp SpO2   10/27/20 0758 (!) 176/87 97.1 °F (36.2 °C) Oral 64 16 93 %       Intake/Output Summary (Last 24 hours) at 10/27/2020 1106  Last data filed at 10/27/2020 0108  Gross per 24 hour   Intake 240 ml   Output 1075 ml   Net -835 ml       Physical Exam:  Spoke with patient over the phone   She said that \"I am doing well.   I am excited about going home\"        Scheduled Meds:   remdesivir IVPB  100 mg Intravenous Q24H    levofloxacin  750 mg Intravenous Q24H    amLODIPine  5 mg Oral Daily    insulin lispro  0-12 Units Subcutaneous TID WC    insulin lispro  0-6 Units Subcutaneous Nightly    warfarin (COUMADIN) daily dosing (placeholder)   Other RX Placeholder    insulin lispro protamine & lispro  35 Units Subcutaneous BID WC    dexamethasone  6 mg Intravenous Q24H    vitamin B complex w/C  1 tablet Oral Daily    levothyroxine  112 mcg Oral Daily    lisinopril  40 mg Oral Daily    magnesium oxide  200 mg Oral Daily    pantoprazole  40 mg Oral Daily    rosuvastatin  10 mg Oral Daily    vitamin B-12  1,000 mcg Oral Daily    sodium chloride flush  10 mL Intravenous 2 times per day       Continuous Infusions:   dextrose         PRN Meds:  guaiFENesin-dextromethorphan, prochlorperazine, nitroGLYCERIN, sodium chloride flush, acetaminophen **OR** acetaminophen, polyethylene glycol, glucose, dextrose, glucagon (rDNA), dextrose      Data:  CBC:   Recent Labs     10/25/20  0626 10/26/20  0638 10/27/20  0651   WBC 4.0 5.7 3.6*   HGB 13.0 12.9 13.5   HCT 38.7 38.0 40.6   MCV 88.4 88.6 90.0   * 129* 148     BMP:   Recent Labs     10/25/20  0626 10/27/20  0651    130*   K 4.3 3.6    99   CO2 20* 23   BUN 18 17   CREATININE 0.7 0.6     LIVER PROFILE:   Recent Labs     10/25/20  0626 10/26/20  0638 10/27/20  0651   AST 23 27 25   ALT 20 22 23   BILIDIR <0.2 <0.2  --    BILITOT 0.4 0.5 0.4   ALKPHOS 51 49 53     PT/INR:   Recent Labs     10/25/20  0626 10/26/20  0638 10/27/20  0651   PROTIME 40.6* 27.5* 29.2*   INR 3.46* 2.35* 2.49*       CULTURES  None      RADIOLOGY  XR CHEST PORTABLE   Final Result   There has been development of a small focus of airspace disease the left lung   base, pneumonia versus edema versus atelectasis. There appears to be a background of pulmonary vascular congestion, suggesting   pulmonary edema. XR CHEST PORTABLE   Final Result   Hypoinflated lungs with patchy perihilar and bibasilar opacities which may be   on the basis of edema or infection in the appropriate clinical setting. Probable trace right pleural effusion. Assessment/Plan:    COVID-19 infection   Acute febrile illness  Acute hypoxic resp failure with shortness of breath,increased RR  - testing done at PCP office showed positive COVID test    -Cannot rule out superimposed bacterial pneumonia  -She has persistent high fevers, hypoxic requiring 2 L of O2  - Continue Decadron   - CXR with bibasilar opacities   - Continue DROPLET + precautions   - Started the patient on Levaquin. cont day 3  -  blood cultures and urine cultures - sent - pending .   - Consulted pulmonology  Initiated treatment with remdesivir day 3 , transfused 2 units of Covid convalescent plasma 10/25  Now on room air.     Thrombocytopenia   - acute, 132-> 140--> 125--129--130  - baseline ~180   - Likely 2/2 acute infection   -Monitor    DM2  - BG is elevated   - Humulin R at home - resumed and increase dose   - Continue SSI      Hypothyroidism  - Continue Synthroid     HTN  - BP is uncontrolled   - Continue lisinopril , increased home norvasc    Atrial fibrillation   - AC on Coumadin   -Tachycardic in the setting of high fevers.    Monitor heart rate,  Pharmacy to monitor    Rectal bleeding  -In the setting of coagulopathy  -Monitor H&H  stable    HLD  - Continue statin     DVT Prophylaxis: Coumadin   Diet: DIET CARB CONTROL;  Code Status: Full Code     D/c home       Shawn Castellanos MD 10/27/2020 11:06 AM

## 2020-10-27 NOTE — PROGRESS NOTES
Patient scored high on the bañuelos fall risk scale. Interventions taken; verified bed/chair alarm is activated, yellow socks placed on patient, and stay with me sign posted outside patients room.

## 2020-10-27 NOTE — PLAN OF CARE
Problem: Falls - Risk of:  Goal: Will remain free from falls  Description: Will remain free from falls  21/55/0715 8650 by Elver Miller RN  Outcome: Ongoing  10/27/2020 0040 by Donald Martel RN  Outcome: Ongoing  Goal: Absence of physical injury  Description: Absence of physical injury  33/97/3699 8988 by Elver Miller RN  Outcome: Ongoing  10/27/2020 0040 by Donald Martel RN  Outcome: Ongoing     Problem: Airway Clearance - Ineffective  Goal: Achieve or maintain patent airway  03/32/1102 6024 by Elver Miller RN  Outcome: Ongoing  10/27/2020 0040 by Donald Martel RN  Outcome: Ongoing     Problem: Gas Exchange - Impaired  Goal: Absence of hypoxia  47/64/6476 2104 by Elver Miller RN  Outcome: Ongoing  10/27/2020 0040 by Donald Martel RN  Outcome: Ongoing  Goal: Promote optimal lung function  93/73/7548 6949 by Elver Miller RN  Outcome: Ongoing  10/27/2020 0040 by Donald Martel RN  Outcome: Ongoing     Problem: Breathing Pattern - Ineffective  Goal: Ability to achieve and maintain a regular respiratory rate  31/35/6514 9416 by Elver Miller RN  Outcome: Ongoing  10/27/2020 0040 by Donald Martel RN  Outcome: Ongoing     Problem:  Body Temperature -  Risk of, Imbalanced  Goal: Ability to maintain a body temperature within defined limits  42/60/2772 0133 by Elver Miller RN  Outcome: Ongoing  10/27/2020 0040 by Donald Martel RN  Outcome: Ongoing  Goal: Will regain or maintain usual level of consciousness  90/78/6338 7550 by Elver Miller RN  Outcome: Ongoing  10/27/2020 0040 by Donald Martel RN  Outcome: Ongoing  Goal: Complications related to the disease process, condition or treatment will be avoided or minimized  13/64/4732 1729 by Elver Miller RN  Outcome: Ongoing  10/27/2020 0040 by Donald Martel RN  Outcome: Ongoing     Problem: Isolation Precautions - Risk of Spread of Infection  Goal: Prevent transmission of infection  55/79/9910 0627 by Julian Duke RN  Outcome: Ongoing  10/27/2020 0040 by Anabell Munoz RN  Outcome: Ongoing     Problem: Nutrition Deficits  Goal: Optimize nutrtional status  95/84/5946 5816 by Julian Duke RN  Outcome: Ongoing  10/27/2020 0040 by Anabell Munoz RN  Outcome: Ongoing     Problem: Risk for Fluid Volume Deficit  Goal: Maintain normal heart rhythm  67/01/2810 7068 by Julian Duke RN  Outcome: Ongoing  10/27/2020 0040 by Anabell Munoz RN  Outcome: Ongoing  Goal: Maintain absence of muscle cramping  10/78/0462 7550 by Julian Duke RN  Outcome: Ongoing  10/27/2020 0040 by Anabell Munoz RN  Outcome: Ongoing  Goal: Maintain normal serum potassium, sodium, calcium, phosphorus, and pH  62/87/5133 3812 by Julian Duke RN  Outcome: Ongoing  10/27/2020 0040 by Anabell Munoz RN  Outcome: Ongoing     Problem: Loneliness or Risk for Loneliness  Goal: Demonstrate positive use of time alone when socialization is not possible  77/89/7282 6139 by Julian Duke RN  Outcome: Ongoing  10/27/2020 0040 by Anabell Munoz RN  Outcome: Ongoing     Problem: Fatigue  Goal: Verbalize increase energy and improved vitality  09/13/1283 2665 by Julian Duke RN  Outcome: Ongoing  10/27/2020 0040 by Anabell Munoz RN  Outcome: Ongoing     Problem: Patient Education: Go to Patient Education Activity  Goal: Patient/Family Education  63/91/9534 2617 by Julian Duke RN  Outcome: Ongoing  10/27/2020 0040 by Anabell Munoz RN  Outcome: Ongoing     Problem: Pain:  Goal: Pain level will decrease  Description: Pain level will decrease  35/86/7356 6384 by Julian Duke RN  Outcome: Ongoing  10/27/2020 0040 by Anabell Munoz RN  Outcome: Ongoing  Goal: Control of acute pain  Description: Control of acute pain  38/73/4037 4327 by Julian Duke RN  Outcome: Ongoing  10/27/2020 0040 by Anabell Munoz RN  Outcome: Ongoing  Goal: Control of chronic pain  Description: Control of chronic pain  99/16/9218 3979 by Claudeen Judge, RN  Outcome: Ongoing  10/27/2020 0040 by Caryn Chakraborty RN  Outcome: Ongoing     Problem: Skin Integrity:  Goal: Will show no infection signs and symptoms  Description: Will show no infection signs and symptoms  54/24/8821 1768 by Claudeen Judge, RN  Outcome: Ongoing  10/27/2020 0040 by Caryn hCakraborty RN  Outcome: Ongoing  Goal: Absence of new skin breakdown  Description: Absence of new skin breakdown  86/18/6181 0453 by Claudeen Judge, RN  Outcome: Ongoing  10/27/2020 0040 by Caryn Chakraborty RN  Outcome: Ongoing

## 2020-10-27 NOTE — PLAN OF CARE
Problem: Falls - Risk of:  Goal: Will remain free from falls  Description: Will remain free from falls  Outcome: Ongoing  Goal: Absence of physical injury  Description: Absence of physical injury  Outcome: Ongoing     Problem: Airway Clearance - Ineffective  Goal: Achieve or maintain patent airway  Outcome: Ongoing     Problem: Gas Exchange - Impaired  Goal: Absence of hypoxia  Outcome: Ongoing  Goal: Promote optimal lung function  Outcome: Ongoing     Problem: Breathing Pattern - Ineffective  Goal: Ability to achieve and maintain a regular respiratory rate  Outcome: Ongoing     Problem:  Body Temperature -  Risk of, Imbalanced  Goal: Ability to maintain a body temperature within defined limits  Outcome: Ongoing  Goal: Will regain or maintain usual level of consciousness  Outcome: Ongoing  Goal: Complications related to the disease process, condition or treatment will be avoided or minimized  Outcome: Ongoing     Problem: Isolation Precautions - Risk of Spread of Infection  Goal: Prevent transmission of infection  Outcome: Ongoing     Problem: Nutrition Deficits  Goal: Optimize nutrtional status  Outcome: Ongoing     Problem: Risk for Fluid Volume Deficit  Goal: Maintain normal heart rhythm  Outcome: Ongoing  Goal: Maintain absence of muscle cramping  Outcome: Ongoing  Goal: Maintain normal serum potassium, sodium, calcium, phosphorus, and pH  Outcome: Ongoing     Problem: Loneliness or Risk for Loneliness  Goal: Demonstrate positive use of time alone when socialization is not possible  Outcome: Ongoing     Problem: Fatigue  Goal: Verbalize increase energy and improved vitality  Outcome: Ongoing     Problem: Patient Education: Go to Patient Education Activity  Goal: Patient/Family Education  Outcome: Ongoing     Problem: Pain:  Goal: Pain level will decrease  Description: Pain level will decrease  Outcome: Ongoing  Goal: Control of acute pain  Description: Control of acute pain  Outcome: Ongoing  Goal: Control of

## 2020-10-27 NOTE — PROGRESS NOTES
Notified of /79. Assessed pt. Retook BP. 162/78. Pt resting as manifested by eyes closed in dark room. Respirations even and easy. Call light and bedside table in reach. Bed in low locked position. Denies any needs at this time.

## 2020-10-27 NOTE — PROGRESS NOTES
Pulmonary Progress Note    CC: COVID-19 pneumonia    Subjective:   Room air  No fever  Continues to improve  Wants to go home  IV line:      Intake/Output Summary (Last 24 hours) at 10/27/2020 0710  Last data filed at 10/27/2020 0108  Gross per 24 hour   Intake 480 ml   Output 1075 ml   Net -595 ml       Exam:   BP (!) 162/78   Pulse 59   Temp 97.1 °F (36.2 °C) (Oral)   Resp 16   Ht 5' 5\" (1.651 m)   Wt 188 lb 14.4 oz (85.7 kg)   SpO2 93%   Breastfeeding No   BMI 31.43 kg/m²  on room air  Gen: No distress. Alert. Eyes: PERRL. No sclera icterus. No conjunctival injection. ENT: No discharge. Pharynx clear. Neck: Trachea midline. Normal thyroid. Resp:  No accessory muscle use. Few crackles. No wheezes. No rhonchi. No dullness on percussion. CV: Regular rate. Regular rhythm. No murmur or rub. No edema. GI: Non-tender. Non-distended. No masses. No organomegaly. Normal bowel sounds. No hernia. Skin: Warm and dry. No nodule on exposed extremities. No rash on exposed extremities. Lymph: No cervical LAD. No supraclavicular LAD. M/S: No cyanosis. No joint deformity. No clubbing. Neuro: Awake. Moves all extremities. CN grossly intact. Psych: Oriented x 3. No anxiety or agitation.      Scheduled Meds:   remdesivir IVPB  100 mg Intravenous Q24H    levofloxacin  750 mg Intravenous Q24H    amLODIPine  5 mg Oral Daily    insulin lispro  0-12 Units Subcutaneous TID     insulin lispro  0-6 Units Subcutaneous Nightly    warfarin (COUMADIN) daily dosing (placeholder)   Other RX Placeholder    insulin lispro protamine & lispro  35 Units Subcutaneous BID WC    dexamethasone  6 mg Intravenous Q24H    vitamin B complex w/C  1 tablet Oral Daily    levothyroxine  112 mcg Oral Daily    lisinopril  40 mg Oral Daily    magnesium oxide  200 mg Oral Daily    pantoprazole  40 mg Oral Daily    rosuvastatin  10 mg Oral Daily    vitamin B-12  1,000 mcg Oral Daily    sodium chloride flush  10 mL Intravenous 2 times per day     Continuous Infusions:   dextrose       PRN Meds:  guaiFENesin-dextromethorphan, prochlorperazine, nitroGLYCERIN, sodium chloride flush, acetaminophen **OR** acetaminophen, polyethylene glycol, glucose, dextrose, glucagon (rDNA), dextrose    Labs:  CBC:   Recent Labs     10/25/20  0626 10/26/20  0638   WBC 4.0 5.7   HGB 13.0 12.9   HCT 38.7 38.0   MCV 88.4 88.6   * 129*     BMP:   Recent Labs     10/25/20  0626      K 4.3      CO2 20*   BUN 18   CREATININE 0.7     LIVER PROFILE:   Recent Labs     10/25/20  0626 10/26/20  0638   AST 23 27   ALT 20 22   BILIDIR <0.2 <0.2   BILITOT 0.4 0.5   ALKPHOS 51 49     PT/INR:   Recent Labs     10/25/20  0626 10/26/20  0638   PROTIME 40.6* 27.5*   INR 3.46* 2.35*     APTT: No results for input(s): APTT in the last 72 hours. UA:  Recent Labs     10/24/20  2106   COLORU Yellow   PHUR 5.0   WBCUA 3-5   RBCUA 0-2   BACTERIA Rare*   CLARITYU Clear   SPECGRAV >=1.030   LEUKOCYTESUR Negative   UROBILINOGEN 0.2   BILIRUBINUR Negative   BLOODU TRACE-INTACT*   GLUCOSEU >=1000*     No results for input(s): PHART, RLN8XTN, PO2ART in the last 72 hours.   Cultures:   10/24 BC 1/2 gram-positive cocci in cluster  10/24  NGTD       Films:  CXR 10/24 reviewed by me and showed  Low lung volumes with bilateral ASD         ASSESSMENT:  · Acute hypoxemic respiratory failure   · COVID-19 pneumonia  · Positive blood culture-probably contamination given significant clinical improvement  · History of CVA with right-sided weakness  · A. fib on Coumadin  · Coagulopathy        PLAN:  · Supplemental oxygen to maintain SaO2 >92%; wean as tolerated  · Droplet plus precautions  · Remdesivir day #3  · Post 2 units of CCP  · Decadron day #7  · Levaquin day #6/6  · Patient to be quarantined for 20 days from the start of symptoms  · DC planning is okay with pulmonary off antibiotics and steroids  · I recommend CXR in 4-6 week to document resolution of abnormalities

## 2020-10-27 NOTE — PROGRESS NOTES
Patient educated on discharge instructions as well as new medications use, dosage, administration and possible side effects. Patient verified knowledge. IV removed without difficulty and dry dressing in place. Telemetry monitor removed andleft in room for EVS to clean. Pt left facility in stable condition to Home with all of their personal belongings.

## 2020-10-28 ENCOUNTER — CARE COORDINATION (OUTPATIENT)
Dept: CASE MANAGEMENT | Age: 80
End: 2020-10-28

## 2020-10-28 NOTE — CARE COORDINATION
symptoms and resources available to patient including: PCP and CTN. The patient agrees to contact the PCP office for questions related to their healthcare. Medication reconciliation was performed with patient, who verbalizes understanding of administration of home medications. Covid Risk Education    Patient has following COVID-19 risk factors: acute respiratory failure and diabetes. Education provided regarding infection prevention, and signs and symptoms of COVID-19 and when to seek medical attention with patient who verbalized understanding. Discussed exposure protocols and quarantine From [facility]: Are you at higher risk for severe illness?   and given an opportunity for questions and concerns. The patient agrees to contact the PCP office for questions related to COVID-19. Symptoms reviewed with patient who verbalized the following symptoms: cough, no new symptoms and no worsening symptoms. Due to no new or worsening symptoms encounter was not routed to provider for escalation. Patient/family/caregiver given information for [facility] and agrees to enroll no    Discussed follow-up appointments. If no appointment was previously scheduled, appointment scheduling offered: Yes. Is follow up appointment scheduled within 7 days of discharge? No  [facility] follow up appointment(s): Brittany SOLOMON    Plan for follow-up call in 5-7 days based on severity of symptoms and risk factors. Plan for next call: medication management-warfarin    Hasn't checked her BS yet but about to. Appetite fair. Tolerating PO. Cough minimal. denies fever, chills, n/v/d. On warfarin, [facility] PCP monitors INR and dosing and she will call her PCP today to ask about INR check. INR 2.49 on 10/27/2020. [facility] cardiology offered Eliquis or Xarelto but she states she couldn't afford it so plans to stay on warfarin. Aware she is to quarantine x 10 days and not symptomatic for 3 days without medications.  feels overall improved. Has assistance of spouse and son at home. States she has been able to get to bathroom independently with use of her hoveround. Declines HC or other needs. CTN provided contact information for future needs. Outreach to PCP office of [place] at [employer]. Notified them patient (and spouse) home from admission with COVID-19 dx and will need follow up with INR. Left message with Sherrill Manrique who sent message to provider and care coordinator.  Faxed discharge summary to PCP at ([phone]) per their request.    Follow Up  Future Appointments   Date Time Provider [place] Kyle   1/4/2021  1:00 PM John CASAS   3/29/2021 10:00 AM MD Sunil Bowen [facility]       Sreekanth Ha, RN  Care Transition Nurse  [phone] mobile

## 2020-10-29 ENCOUNTER — HOSPITAL ENCOUNTER (OUTPATIENT)
Age: 80
Setting detail: OBSERVATION
Discharge: HOME | End: 2020-11-02
Attending: EMERGENCY MEDICINE | Admitting: INTERNAL MEDICINE
Payer: MEDICARE

## 2020-10-29 ENCOUNTER — APPOINTMENT (OUTPATIENT)
Dept: GENERAL RADIOLOGY | Age: 80
End: 2020-10-29
Payer: MEDICARE

## 2020-10-29 ENCOUNTER — TELEPHONE (OUTPATIENT)
Dept: OTHER | Facility: CLINIC | Age: 80
End: 2020-10-29

## 2020-10-29 PROBLEM — R53.1 WEAKNESS: Status: ACTIVE | Noted: 2020-10-29

## 2020-10-29 LAB
A/G RATIO: 1.2 (ref 1.1–2.2)
ALBUMIN SERPL-MCNC: 3.3 G/DL (ref 3.4–5)
ALP BLD-CCNC: 74 U/L (ref 40–129)
ALT SERPL-CCNC: 19 U/L (ref 10–40)
ANION GAP SERPL CALCULATED.3IONS-SCNC: 9 MMOL/L (ref 3–16)
AST SERPL-CCNC: 21 U/L (ref 15–37)
BASOPHILS ABSOLUTE: 0 K/UL (ref 0–0.2)
BASOPHILS RELATIVE PERCENT: 0.6 %
BILIRUB SERPL-MCNC: 0.5 MG/DL (ref 0–1)
BUN BLDV-MCNC: 12 MG/DL (ref 7–20)
CALCIUM SERPL-MCNC: 7.8 MG/DL (ref 8.3–10.6)
CHLORIDE BLD-SCNC: 102 MMOL/L (ref 99–110)
CO2: 25 MMOL/L (ref 21–32)
CREAT SERPL-MCNC: 0.7 MG/DL (ref 0.6–1.2)
CULTURE, BLOOD 2: NORMAL
EKG ATRIAL RATE: 75 BPM
EKG DIAGNOSIS: NORMAL
EKG P AXIS: 24 DEGREES
EKG P-R INTERVAL: 168 MS
EKG Q-T INTERVAL: 456 MS
EKG QRS DURATION: 132 MS
EKG QTC CALCULATION (BAZETT): 509 MS
EKG R AXIS: -3 DEGREES
EKG T AXIS: -25 DEGREES
EKG VENTRICULAR RATE: 75 BPM
EOSINOPHILS ABSOLUTE: 0.2 K/UL (ref 0–0.6)
EOSINOPHILS RELATIVE PERCENT: 2.7 %
GFR AFRICAN AMERICAN: >60
GFR NON-AFRICAN AMERICAN: >60
GLOBULIN: 2.8 G/DL
GLUCOSE BLD-MCNC: 100 MG/DL (ref 70–99)
GLUCOSE BLD-MCNC: 242 MG/DL (ref 70–99)
GLUCOSE BLD-MCNC: 70 MG/DL (ref 70–99)
HCT VFR BLD CALC: 39.5 % (ref 36–48)
HEMOGLOBIN: 13.4 G/DL (ref 12–16)
INR BLD: 2.21 (ref 0.86–1.14)
LYMPHOCYTES ABSOLUTE: 1 K/UL (ref 1–5.1)
LYMPHOCYTES RELATIVE PERCENT: 14.1 %
MCH RBC QN AUTO: 29.7 PG (ref 26–34)
MCHC RBC AUTO-ENTMCNC: 33.9 G/DL (ref 31–36)
MCV RBC AUTO: 87.4 FL (ref 80–100)
MONOCYTES ABSOLUTE: 0.7 K/UL (ref 0–1.3)
MONOCYTES RELATIVE PERCENT: 9.6 %
NEUTROPHILS ABSOLUTE: 5.1 K/UL (ref 1.7–7.7)
NEUTROPHILS RELATIVE PERCENT: 73 %
PDW BLD-RTO: 13.9 % (ref 12.4–15.4)
PERFORMED ON: ABNORMAL
PERFORMED ON: NORMAL
PLATELET # BLD: 196 K/UL (ref 135–450)
PMV BLD AUTO: 9.2 FL (ref 5–10.5)
POTASSIUM REFLEX MAGNESIUM: 3.6 MMOL/L (ref 3.5–5.1)
PROTHROMBIN TIME: 25.9 SEC (ref 10–13.2)
RBC # BLD: 4.52 M/UL (ref 4–5.2)
SODIUM BLD-SCNC: 136 MMOL/L (ref 136–145)
TOTAL PROTEIN: 6.1 G/DL (ref 6.4–8.2)
WBC # BLD: 7 K/UL (ref 4–11)

## 2020-10-29 PROCEDURE — 99218 PR INITIAL OBSERVATION CARE/DAY 30 MINUTES: CPT | Performed by: NURSE PRACTITIONER

## 2020-10-29 PROCEDURE — G0378 HOSPITAL OBSERVATION PER HR: HCPCS

## 2020-10-29 PROCEDURE — 80053 COMPREHEN METABOLIC PANEL: CPT

## 2020-10-29 PROCEDURE — 93010 ELECTROCARDIOGRAM REPORT: CPT | Performed by: INTERNAL MEDICINE

## 2020-10-29 PROCEDURE — 6370000000 HC RX 637 (ALT 250 FOR IP): Performed by: NURSE PRACTITIONER

## 2020-10-29 PROCEDURE — 2580000003 HC RX 258: Performed by: NURSE PRACTITIONER

## 2020-10-29 PROCEDURE — 85025 COMPLETE CBC W/AUTO DIFF WBC: CPT

## 2020-10-29 PROCEDURE — 93005 ELECTROCARDIOGRAM TRACING: CPT | Performed by: EMERGENCY MEDICINE

## 2020-10-29 PROCEDURE — 99285 EMERGENCY DEPT VISIT HI MDM: CPT

## 2020-10-29 PROCEDURE — 85610 PROTHROMBIN TIME: CPT

## 2020-10-29 PROCEDURE — 71045 X-RAY EXAM CHEST 1 VIEW: CPT

## 2020-10-29 RX ORDER — ACETAMINOPHEN 650 MG/1
650 SUPPOSITORY RECTAL EVERY 6 HOURS PRN
Status: DISCONTINUED | OUTPATIENT
Start: 2020-10-29 | End: 2020-11-02 | Stop reason: HOSPADM

## 2020-10-29 RX ORDER — PROMETHAZINE HYDROCHLORIDE 25 MG/1
12.5 TABLET ORAL EVERY 6 HOURS PRN
Status: DISCONTINUED | OUTPATIENT
Start: 2020-10-29 | End: 2020-11-02 | Stop reason: HOSPADM

## 2020-10-29 RX ORDER — POLYETHYLENE GLYCOL 3350 17 G/17G
17 POWDER, FOR SOLUTION ORAL DAILY PRN
Status: DISCONTINUED | OUTPATIENT
Start: 2020-10-29 | End: 2020-11-02 | Stop reason: HOSPADM

## 2020-10-29 RX ORDER — DEXTROSE MONOHYDRATE 25 G/50ML
12.5 INJECTION, SOLUTION INTRAVENOUS PRN
Status: DISCONTINUED | OUTPATIENT
Start: 2020-10-29 | End: 2020-11-02 | Stop reason: HOSPADM

## 2020-10-29 RX ORDER — SODIUM CHLORIDE 0.9 % (FLUSH) 0.9 %
10 SYRINGE (ML) INJECTION EVERY 12 HOURS SCHEDULED
Status: DISCONTINUED | OUTPATIENT
Start: 2020-10-29 | End: 2020-11-02 | Stop reason: HOSPADM

## 2020-10-29 RX ORDER — LISINOPRIL 20 MG/1
40 TABLET ORAL DAILY
Status: DISCONTINUED | OUTPATIENT
Start: 2020-10-29 | End: 2020-11-02 | Stop reason: HOSPADM

## 2020-10-29 RX ORDER — SODIUM CHLORIDE 0.9 % (FLUSH) 0.9 %
10 SYRINGE (ML) INJECTION PRN
Status: DISCONTINUED | OUTPATIENT
Start: 2020-10-29 | End: 2020-11-02 | Stop reason: HOSPADM

## 2020-10-29 RX ORDER — LEVOTHYROXINE SODIUM 112 UG/1
112 TABLET ORAL DAILY
Status: DISCONTINUED | OUTPATIENT
Start: 2020-10-29 | End: 2020-11-02 | Stop reason: HOSPADM

## 2020-10-29 RX ORDER — ROSUVASTATIN CALCIUM 10 MG/1
10 TABLET, COATED ORAL DAILY
Status: DISCONTINUED | OUTPATIENT
Start: 2020-10-29 | End: 2020-11-02 | Stop reason: HOSPADM

## 2020-10-29 RX ORDER — ONDANSETRON 2 MG/ML
4 INJECTION INTRAMUSCULAR; INTRAVENOUS EVERY 6 HOURS PRN
Status: DISCONTINUED | OUTPATIENT
Start: 2020-10-29 | End: 2020-11-02 | Stop reason: HOSPADM

## 2020-10-29 RX ORDER — DEXTROSE MONOHYDRATE 50 MG/ML
100 INJECTION, SOLUTION INTRAVENOUS PRN
Status: DISCONTINUED | OUTPATIENT
Start: 2020-10-29 | End: 2020-11-02 | Stop reason: HOSPADM

## 2020-10-29 RX ORDER — ACETAMINOPHEN 325 MG/1
650 TABLET ORAL EVERY 6 HOURS PRN
Status: DISCONTINUED | OUTPATIENT
Start: 2020-10-29 | End: 2020-11-02 | Stop reason: HOSPADM

## 2020-10-29 RX ORDER — AMLODIPINE BESYLATE 5 MG/1
5 TABLET ORAL DAILY
Status: DISCONTINUED | OUTPATIENT
Start: 2020-10-29 | End: 2020-11-02 | Stop reason: HOSPADM

## 2020-10-29 RX ORDER — CHOLECALCIFEROL (VITAMIN D3) 125 MCG
1000 CAPSULE ORAL DAILY
Status: DISCONTINUED | OUTPATIENT
Start: 2020-10-29 | End: 2020-11-02 | Stop reason: HOSPADM

## 2020-10-29 RX ORDER — MULTIVITAMIN WITH IRON
250 TABLET ORAL DAILY
Status: DISCONTINUED | OUTPATIENT
Start: 2020-10-29 | End: 2020-10-29

## 2020-10-29 RX ORDER — NICOTINE POLACRILEX 4 MG
15 LOZENGE BUCCAL PRN
Status: DISCONTINUED | OUTPATIENT
Start: 2020-10-29 | End: 2020-11-02 | Stop reason: HOSPADM

## 2020-10-29 RX ADMIN — Medication 10 ML: at 22:46

## 2020-10-29 RX ADMIN — ACETAMINOPHEN 650 MG: 325 TABLET ORAL at 23:08

## 2020-10-29 ASSESSMENT — PAIN SCALES - GENERAL
PAINLEVEL_OUTOF10: 5
PAINLEVEL_OUTOF10: 0
PAINLEVEL_OUTOF10: 5

## 2020-10-29 ASSESSMENT — PAIN DESCRIPTION - ORIENTATION: ORIENTATION: RIGHT

## 2020-10-29 ASSESSMENT — PAIN DESCRIPTION - PAIN TYPE: TYPE: CHRONIC PAIN

## 2020-10-29 ASSESSMENT — PAIN DESCRIPTION - LOCATION: LOCATION: BACK

## 2020-10-29 NOTE — ED PROVIDER NOTES
[address] ED    CHIEF COMPLAINT  Shortness of Breath       HISTORY OF PRESENT ILLNESS  Shola Laureano is a 79 y.o. female who presents to the ED with shortness of breath. Of note, the patient was discharged from this hospital 2 days ago after admission 10/21/2020 through 10/27/2020 for shortness of breath, dyspnea on exertion, fatigue, diarrhea in the setting of positive COVID-19 test.  She was treated with Levaquin and Decadron, convalescent plasma, and remdesivir. Since then the patient has remained with shortness of breath. She denies fever, chest pain, nausea, vomiting, diarrhea, abdominal pain, leg swelling, calf pain/tenderness. No other complaints, modifying factors or associated symptoms.      I have reviewed the following from the nursing documentation:    Past Medical History:   Diagnosis Date    Atrial fibrillation ([address]     Cancer ([address]     Cerebral artery occlusion with cerebral infarction ([address]     left sided weakness-left arm and left leg, also left eye    COVID-19     Diabetes mellitus ([address]     Fractures     Hypertension     Thyroid disease      Past Surgical History:   Procedure Laterality Date    ANKLE FRACTURE SURGERY  2019    left ankle    ANKLE SURGERY Left 6/17/2014    Removal painful hardware    CHOLECYSTECTOMY      CYST REMOVAL      chest x2, back x1    HAND SURGERY      right    HAND SURGERY  2/21/12    DUPUYTRENS RELEASE LEFT MIDDLE AND RING FINGERS INCLUDING    HYSTERECTOMY      THYROIDECTOMY       Family History   Problem Relation Age of Onset    Cancer Mother     Diabetes Mother     Cancer Brother     High Blood Pressure Brother     Depression Maternal Aunt     Heart Disease Brother     Diabetes Brother     High Blood Pressure Brother     Heart Disease Brother     Diabetes Brother     High Blood Pressure Brother      Social History     Socioeconomic History    Marital status:      Spouse name: Not on file    Number of children: Not on file    Years of education: Not on file    Highest education level: Not on file   Occupational History    Not on file   Social Needs    Financial resource strain: Not on file    Food insecurity     Worry: Not on file     Inability: Not on file    Transportation needs     Medical: Not on file     Non-medical: Not on file   Tobacco Use    Smoking status: Never Smoker    Smokeless tobacco: Never Used   Substance and Sexual Activity    Alcohol use: No    Drug use: No    Sexual activity: Not Currently   Lifestyle    Physical activity     Days per week: Not on file     Minutes per session: Not on file    Stress: Not on file   Relationships    Social connections     Talks on phone: Not on file     Gets together: Not on file     Attends Christian service: Not on file     Active member of club or organization: Not on file     Attends meetings of clubs or organizations: Not on file     Relationship status: Not on file    Intimate partner violence     Fear of current or ex partner: Not on file     Emotionally abused: Not on file     Physically abused: Not on file     Forced sexual activity: Not on file   Other Topics Concern    Not on file   Social History Narrative    Not on file     No current facility-administered medications for this encounter.       Current Outpatient Medications   Medication Sig Dispense Refill    amLODIPine (NORVASC) 5 MG tablet Take 1 tablet by mouth daily 30 tablet 0    b complex vitamins capsule Take 1 capsule by mouth daily      nitroGLYCERIN (NITROSTAT) 0.4 MG SL tablet Place 1 tablet under the tongue every 5 minutes as needed for Chest pain 25 tablet 0    Continuous Blood Gluc Sensor (FREESTYLE ASHELY 14 DAY SENSOR) MISC 2 Units by Does not apply route every 14 days 6 each 5    Insulin Syringe-Needle U-100 (INSULIN SYRINGE 1CC/30GX5/16\") 30G X 5/16\" 1 ML MISC 1 each by Does not apply route 3 times daily 270 each 0    warfarin (COUMADIN) 7.5 MG tablet Take 7.5 mg by mouth Tues-Thur-Sat      insulin regular (HUMULIN R;NOVOLIN R) 100 UNIT/ML injection Inject 44-46 Units into the skin Daily with supper      insulin 70-30 (HUMULIN 70/30) (70-30) 100 UNIT per ML injection vial Inject 44-46 Units into the skin 2 times daily Morning and bedtime      nystatin (MYCOSTATIN) 990346 UNIT/GM cream       levothyroxine (SYNTHROID) 112 MCG tablet Take 112 mcg by mouth Daily       rosuvastatin (CRESTOR) 10 MG tablet Take 1 tablet by mouth daily 90 tablet 3    lisinopril (PRINIVIL;ZESTRIL) 40 MG tablet Take 40 mg by mouth daily      magnesium (MAGNESIUM-OXIDE) 250 MG TABS tablet Take 250 mg by mouth daily 3x/week      Omega-3 Fatty Acids (FISH OIL) 1200 MG CAPS Take 1 capsule by mouth 2 times daily       vitamin B-12 (CYANOCOBALAMIN) 1000 MCG tablet Take 1,000 mcg by mouth daily 3x/week       Allergies   Allergen Reactions    Cephalexin     Contrast [Iodides] Shortness Of Breath    Barium-Containing Compounds        REVIEW OF SYSTEMS  10 systems reviewed, pertinent positives and negatives per HPI, otherwise noted to be negative. PHYSICAL EXAM  ED Triage Vitals   Enc Vitals Group      BP 10/29/20 1114 (!) 143/60      Pulse 10/29/20 1111 72      Resp 10/29/20 1111 20      Temp 10/29/20 1111 98.2 °F (36.8 °C)      Temp src --       SpO2 10/29/20 1111 99 %      Weight 10/29/20 1111 165 lb (74.8 kg)      Height 10/29/20 1111 5' 5\" (1.651 m)      Head Circumference --       Peak Flow --       Pain Score --       Pain Loc --       Pain Edu? --       Excl. in [address]? --      General appearance: Awake and alert. Cooperative. No acute distress. HENT: Normocephalic. Atraumatic. Mucous membranes are moist.  Neck: Trachea midline. Eyes: PERRL. EOMI. Heart/Chest: RRR. Lungs: Respirations unlabored. Speaking comfortably in full sentences. Abdomen: Non-distended. Musculoskeletal: No extremity edema. No deformity. Skin: Warm and dry. No acute rashes. Neurological: Alert and oriented.  CN II-XII intact. Chronic LUE and LLE weakness. Psychiatric: Mood/affect: normal      LABS  I have reviewed all labs for this visit.    Results for orders placed or performed during the hospital encounter of 10/29/20   CBC Auto Differential   Result Value Ref Range    WBC 7.0 4.0 - 11.0 K/uL    RBC 4.52 4.00 - 5.20 M/uL    Hemoglobin 13.4 12.0 - 16.0 g/dL    Hematocrit 39.5 36.0 - 48.0 %    MCV 87.4 80.0 - 100.0 fL    MCH 29.7 26.0 - 34.0 pg    MCHC 33.9 31.0 - 36.0 g/dL    RDW 13.9 12.4 - 15.4 %    Platelets 215 514 - 955 K/uL    MPV 9.2 5.0 - 10.5 fL    Neutrophils % 73.0 %    Lymphocytes % 14.1 %    Monocytes % 9.6 %    Eosinophils % 2.7 %    Basophils % 0.6 %    Neutrophils Absolute 5.1 1.7 - 7.7 K/uL    Lymphocytes Absolute 1.0 1.0 - 5.1 K/uL    Monocytes Absolute 0.7 0.0 - 1.3 K/uL    Eosinophils Absolute 0.2 0.0 - 0.6 K/uL    Basophils Absolute 0.0 0.0 - 0.2 K/uL   Comprehensive Metabolic Panel w/ Reflex to MG   Result Value Ref Range    Sodium 136 136 - 145 mmol/L    Potassium reflex Magnesium 3.6 3.5 - 5.1 mmol/L    Chloride 102 99 - 110 mmol/L    CO2 25 21 - 32 mmol/L    Anion Gap 9 3 - 16    Glucose 242 (H) 70 - 99 mg/dL    BUN 12 7 - 20 mg/dL    CREATININE 0.7 0.6 - 1.2 mg/dL    GFR Non-African American >60 >60    GFR African American >60 >60    Calcium 7.8 (L) 8.3 - 10.6 mg/dL    Total Protein 6.1 (L) 6.4 - 8.2 g/dL    Alb 3.3 (L) 3.4 - 5.0 g/dL    Albumin/Globulin Ratio 1.2 1.1 - 2.2    Total Bilirubin 0.5 0.0 - 1.0 mg/dL    Alkaline Phosphatase 74 40 - 129 U/L    ALT 19 10 - 40 U/L    AST 21 15 - 37 U/L    Globulin 2.8 g/dL   Protime-INR   Result Value Ref Range    Protime 25.9 (H) 10.0 - 13.2 sec    INR 2.21 (H) 0.86 - 1.14   EKG 12 Lead   Result Value Ref Range    Ventricular Rate 75 BPM    Atrial Rate 75 BPM    P-R Interval 168 ms    QRS Duration 132 ms    Q-T Interval 456 ms    QTc Calculation (Bazett) 509 ms    P Axis 24 degrees    R Axis -3 degrees    T Axis -25 degrees    Diagnosis       Normal sinus rhythm with sinus arrhythmiaRight bundle branch blockInferior infarct , age undeterminedT wave abnormality, consider lateral ischemiaAbnormal ECGNo previous ECGs available       RADIOLOGY  I have reviewed all radiographic studies for this visit. XR CHEST PORTABLE   Final Result   Moderate pulmonary vascular congestion similar to the prior study. No   pleural effusion. ECG  EKG interpreted by myself. Rate: normal  Rhythm: Normal sinus with sinus arrhythmia  Axis: normal  Intervals:   QTc 509  ST Segments: no acute abnormality  T waves: Nonspecific T wave abnormality  Comparison: Compared to 10/24/2020, there is no significant change  Impression: Normal sinus with sinus arrhythmia, right bundle branch block, inferior infarct seen on before 10/21/2020, nonspecific T wave abnormality, prolonged QTC       ED COURSE/MDM  Patient seen and evaluated. Old records reviewed. Labs and imaging reviewed and results discussed with patient/family to extent possible. 50-year-old female presents with shortness of breath in setting of recent COVID-19 diagnosis. On arrival the patient is slightly hypertensive with otherwise reassuring vital signs. Afebrile and nontoxic in appearance. CBC no leukocytosis or anemia. Renal panel no significant electrolyte abnormalities or creatinine elevation. INR within therapeutic range for history of atrial fibrillation. Chest x-ray moderate pulmonary vascular congestion, similar to previous. Patient lives at home with her  who provides her care. She has residual left-sided weakness from a previous stroke and therefore is unable to ambulate without assistance or care for herself adequately. Unfortunately  also with symptomatic COVID19 and is and is admitted to the hospital.  Will admit to hospital medicine for concern that patient not safely ambulate at home. Patient may necessitate PT/OT/placement.       During the patient's ED course, the patient was given:  Medications - No data to display     All questions were answered and the patient/family expressed understanding and agreement with the plan. PROCEDURES  None    CRITICAL CARE  N/A    CLINICAL IMPRESSION  1. COVID-19    2. Shortness of breath    3. Weakness        DISPOSITION   admit    Hasmukh Salinas MD    Note: This chart was created using voice recognition dictation software. Efforts were made by me to ensure accuracy, however some errors may be present due to limitations of this technology and occasionally words are not transcribed correctly.         Hasmukh Salinas MD  10/29/20 4938       Hasmukh Salinas MD  10/29/20 1656

## 2020-10-29 NOTE — PROGRESS NOTES
Patient admitted to room 232/ER. Patient oriented to room, call light, bed rails, phone, lights and bathroom. Patient instructed about the schedule of the day including: vital sign frequency, lab draws, possible tests, frequency of MD and staff rounds, daily weights, I &O's and prescribed diet. Bed alarm deferred patient low fall risk and refuses alarm. Telemetry box in place, patient aware of placement and reason. Bed locked, in lowest position, side rails up 2/4, call light within reach. Recliner Assessment:     Patient is able to demonstrated the ability to move from a reclining position to an upright position within the recliner. 4 Eyes Skin Assessment:     The patient is being assess for   Admission    I agree that 2 RN's have performed a thorough Head to Toe Skin Assessment on the patient. ALL assessment sites listed below have been assessed. Areas assessed by both nurses:   [x]   Head, Face, and Ears   [x]   Shoulders, Back, and Chest, Abdomen  [x]   Arms, Elbows, and Hands   [x]   Coccyx, Sacrum, and Ischium  [x]   Legs, Feet, and Heels        Pinpoint black area on lateral right great toe. Red area abdominal folds and groin areas. **SHARE this note so that the co-signing nurse is able to place an eSignature**    Co-signer eSignature: Electronically signed by Sj Eaton RN on 10/29/20 at 10:57 PM EDT    Does the Patient have Skin Breakdown?   No          Jeet Prevention initiated:  No   Wound Care Orders initiated:  No      [facility] nurse consulted for Pressure Injury (Stage 3,4, Unstageable, DTI, NWPT, Complex wounds)and New or Established Ostomies:  No      Primary Nurse eSignature: Electronically signed by Bryan Hodgson RN on 10/29/20 at 5:26 PM EDT

## 2020-10-29 NOTE — ED NOTES
Pt transported to 2W via cot. Report given to [employer].      Nelson Christian RN  10/29/20 0932

## 2020-10-29 NOTE — ED NOTES
1422- Perfect serve sent to Dr. Kimberly Garcia for admission.      1431- Call was returned by Devon Peterson NP and spoke to Dr. Elidia Barrientos  10/29/20 [address] [place] [address]  10/29/20 1431

## 2020-10-29 NOTE — PLAN OF CARE
Problem: Airway Clearance - Ineffective  Goal: Achieve or maintain patent airway  Outcome: Ongoing     Problem: Gas Exchange - Impaired  Goal: Absence of hypoxia  Outcome: Ongoing  Goal: Promote optimal lung function  Outcome: Ongoing     Problem: Breathing Pattern - Ineffective  Goal: Ability to achieve and maintain a regular respiratory rate  Outcome: Ongoing     Problem:  Body Temperature -  Risk of, Imbalanced  Goal: Ability to maintain a body temperature within defined limits  Outcome: Ongoing  Goal: Will regain or maintain usual level of consciousness  Outcome: Ongoing  Goal: Complications related to the disease process, condition or treatment will be avoided or minimized  Outcome: Ongoing     Problem: Isolation Precautions - Risk of Spread of Infection  Goal: Prevent transmission of infection  Outcome: Ongoing     Problem: Nutrition Deficits  Goal: Optimize nutrtional status  Outcome: Ongoing     Problem: Risk for Fluid Volume Deficit  Goal: Maintain normal heart rhythm  Outcome: Ongoing  Goal: Maintain absence of muscle cramping  Outcome: Ongoing  Goal: Maintain normal serum potassium, sodium, calcium, phosphorus, and pH  Outcome: Ongoing     Problem: Loneliness or Risk for Loneliness  Goal: Demonstrate positive use of time alone when socialization is not possible  Outcome: Ongoing     Problem: Fatigue  Goal: Verbalize increase energy and improved vitality  Outcome: Ongoing     Problem: Patient Education: Go to Patient Education Activity  Goal: Patient/Family Education  Outcome: Ongoing     Problem: Falls - Risk of:  Goal: Will remain free from falls  Outcome: Ongoing  Goal: Absence of physical injury  Outcome: Ongoing     Problem: Skin Integrity:  Goal: Will show no infection signs and symptoms  Outcome: Ongoing  Goal: Absence of new skin breakdown  Outcome: Ongoing

## 2020-10-29 NOTE — H&P
Hospital Medicine History & Physical      PCP: Yaya Gibbons, APRN - CNP    Date of Admission: 10/29/2020    Date of Service: Pt seen/examined on 10/29/2020     Chief Complaint:    Chief Complaint   Patient presents with    Shortness of Breath       History Of Present Illness: The patient is a 79 y.o. female with a PMH of PAF (AC on Coumadin), Hx of CVA, DM Type II, HTN, Hypothyroidism who presented to [facility] ED with complaint of SOB after recently being discharged from the hospital (see below). She reports feeling tired and weak. She has some nausea and poor appetite. She has a cough and feels short of breath. Denies chest pain, fever, vomiting, or diarrhea. Of note: Pt was [facility] after being admitted from 10/21-10/27 for acute respiratory failure 2/2 COVID 19 infection. Pt had persistent high fevers and required 2L NC. She was treated with Decadron, 3 days of Remdesivir and 2 units of CCP. Pt was weaned to RA at discharge. Work up in the ED showed: Pt was afebrile with stable BP and HR. Pt on RA. BG was elevated at 242. No leukocytosis. INR was 2.21 - pt is AC on Coumadin. CXR showed moderate pulmonary vascular congestion similar to prior study. EKG showed NSR with sinus arrhythmia. Pt was admitted to Med Surg for further management.     Past Medical History:        Diagnosis Date    Atrial fibrillation ([address]     Cancer (HCC)     Cerebral artery occlusion with cerebral infarction ([address]     left sided weakness-left arm and left leg, also left eye    COVID-19     Diabetes mellitus ([address]     Fractures     Hypertension     Thyroid disease        Past Surgical History:        Procedure Laterality Date    ANKLE FRACTURE SURGERY  2019    left ankle    ANKLE SURGERY Left 6/17/2014    Removal painful hardware    CHOLECYSTECTOMY      CYST REMOVAL      chest x2, back x1    HAND SURGERY      right    HAND SURGERY  2/21/12    DUPUYTRENS RELEASE LEFT MIDDLE AND RING FINGERS INCLUDING    HYSTERECTOMY      THYROIDECTOMY         Medications Prior to Admission:    Prior to Admission medications    Medication Sig Start Date End Date Taking?  Authorizing Provider   amLODIPine (NORVASC) 5 MG tablet Take 1 tablet by mouth daily 10/27/20   Leonid Huang MD   b complex vitamins capsule Take 1 capsule by mouth daily    Historical Provider, MD   nitroGLYCERIN (NITROSTAT) 0.4 MG SL tablet Place 1 tablet under the tongue every 5 minutes as needed for Chest pain 9/25/20   ANANT Rowan CNP   Continuous Blood Gluc Sensor (FREESTYLE ASHELY 14 DAY SENSOR) MISC 2 Units by Does not apply route every 14 days 6/26/20   ANANT Fernando CNP   Insulin Syringe-Needle U-100 (INSULIN SYRINGE 1CC/30GX5/16\") 30G X 5/16\" 1 ML MISC 1 each by Does not apply route 3 times daily 5/19/20   ANANT Fernando CNP   warfarin (COUMADIN) 7.5 MG tablet Take 7.5 mg by mouth shakilaterri-Sat    Historical Provider, MD   insulin regular (HUMULIN R;NOVOLIN R) 100 UNIT/ML injection Inject 44-46 Units into the skin Daily with supper    Historical Provider, MD   insulin 70-30 (HUMULIN 70/30) (70-30) 100 UNIT per ML injection vial Inject 44-46 Units into the skin 2 times daily Morning and bedtime    Historical Provider, MD   nystatin (MYCOSTATIN) 010785 UNIT/GM cream  2/10/20   Historical Provider, MD   levothyroxine (SYNTHROID) 112 MCG tablet Take 112 mcg by mouth Daily  2/26/20   Historical Provider, MD   rosuvastatin (CRESTOR) 10 MG tablet Take 1 tablet by mouth daily 7/25/18   Sharla Holstein, APRN - CNP   lisinopril (PRINIVIL;ZESTRIL) 40 MG tablet Take 40 mg by mouth daily    Historical Provider, MD   magnesium (MAGNESIUM-OXIDE) 250 MG TABS tablet Take 250 mg by mouth daily 3x/week    Historical Provider, MD   Omega-3 Fatty Acids (FISH OIL) 1200 MG CAPS Take 1 capsule by mouth 2 times daily     Historical Provider, MD   vitamin B-12 (CYANOCOBALAMIN) 1000 MCG tablet Take 1,000 mcg by mouth daily 3x/week Historical Provider, MD       Allergies:  Cephalexin; Contrast [iodides]; and Barium-containing compounds    Social History:  The patient currently lives at home. TOBACCO:   reports that she has never smoked. She has never used smokeless tobacco.  ETOH:   reports no history of alcohol use. Family History:   Positive as follows:        Problem Relation Age of Onset    Cancer Mother     Diabetes Mother     Cancer Brother     High Blood Pressure Brother     Depression Maternal Aunt     Heart Disease Brother     Diabetes Brother     High Blood Pressure Brother     Heart Disease Brother     Diabetes Brother     High Blood Pressure Brother        REVIEW OF SYSTEMS:       Constitutional: Negative for fever + fatigue, poor appetite  HENT: Negative for sore throat   Eyes: Negative for redness   Respiratory: + dyspnea, DIXON, cough   Cardiovascular: Negative for chest pain   Gastrointestinal: Negative for vomiting, diarrhea  Genitourinary: Negative for dysuria  Musculoskeletal: Negative for arthralgias + weakness  Skin: Negative for rash   Neurological: Negative for syncope   Hematological: Negative for adenopathy   Psychiatric/Behavorial: Negative for anxiety    PHYSICAL EXAM:    BP (!) 142/76   Pulse 75   Temp 98.2 °F (36.8 °C)   Resp 19   Ht 5' 5\" (1.651 m)   Wt 165 lb (74.8 kg)   SpO2 91%   BMI 27.46 kg/m²     Gen: No distress. Alert. Appears fatigued. Eyes: PERRL. No sclera icterus. No conjunctival injection. ENT: No discharge. Pharynx clear. Neck: Trachea midline. Resp: No accessory muscle use. LS diminished. No crackles. No wheezes. No rhonchi. CV: Regular rate. Regular rhythm. No murmur. No rub. No edema. Capillary Refill: Brisk,< 3 seconds   Peripheral Pulses: +2 palpable, equal bilaterally   GI: Non-tender. Non-distended. Normal bowel sounds. No hernia. Skin: Warm and dry. No nodule on exposed extremities. No rash on exposed extremities. M/S: No cyanosis. No joint deformity. No clubbing. Neuro: Awake. chronic left UE and LE weakness  Psych: Oriented x 3. No anxiety or agitation. CBC:   Recent Labs     10/27/20  0651 10/29/20  1152   WBC 3.6* 7.0   HGB 13.5 13.4   HCT 40.6 39.5   MCV 90.0 87.4    196     BMP:   Recent Labs     10/27/20  0651 10/29/20  1152   * 136   K 3.6 3.6   CL 99 102   CO2 23 25   BUN 17 12   CREATININE 0.6 0.7     LIVER PROFILE:   Recent Labs     10/27/20  0651 10/29/20  1152   AST 25 21   ALT 23 19   BILITOT 0.4 0.5   ALKPHOS 53 74     PT/INR:   Recent Labs     10/27/20  0651 10/29/20  1152   PROTIME 29.2* 25.9*   INR 2.49* 2.21*     U/A:    Lab Results   Component Value Date    COLORU Yellow 10/24/2020    WBCUA 3-5 10/24/2020    RBCUA 0-2 10/24/2020    BACTERIA Rare 10/24/2020    CLARITYU Clear 10/24/2020    SPECGRAV >=1.030 10/24/2020    LEUKOCYTESUR Negative 10/24/2020    BLOODU TRACE-INTACT 10/24/2020    GLUCOSEU >=1000 10/24/2020    AMORPHOUS 1+ 10/22/2020     EKG:  I have reviewed the EKG with the following interpretation:   Normal sinus rhythm with sinus arrhythmia rate of 75  Right bundle branch block  Inferior infarct , age undetermined  T wave abnormality, consider lateral ischemia  Abnormal ECGNo previous ECGs available     RADIOLOGY    XR CHEST PORTABLE   Final Result   Moderate pulmonary vascular congestion similar to the prior study. No   pleural effusion. Pertinent previous results reviewed     TTE 8/9/2019  Conclusions      Summary   Left ventricular systolic function is normal with a visually estimated   ejection fraction of 55%. Mild concentric left ventricular hypertrophy. No   regional wall motion abnormalities are noted. Diastolic filling parameters   suggests impaired left ventricular relaxation. The aortic arch is mildly dilated at 3.9 cm. Consider other imaging   modalities (e.g. CT) to assess for evaluation of aorta. Mild mitral regurgitation.     ASSESSMENT/PLAN:    COVID 19 Infection  - recently discharged on 10/27 on RA & received Decadron, Remdesivir x 3 days, 2 u CCP   - she is stable on RA. Will monitor on continuous pulse ox. - CXR with moderate pulmonary vascular congestion  - Admitted to Med Surg, droplet + precautions,cont pulse ox and tele    Weakness  - PT/OT  - needs placement  - unable to care for herself at home. Diarrhea  - she denies diarrhea at this time  - most likely due to above. Will monitor.      Hypocalcemia  - 7.8,  2/2 low albumin  - corrects to 8.4, no intervention necesssary    Type II DM  - uncontrolled  - cont Humalog mix - 44 units BID, SSI  - POC Glucose, carb control diet    HTN  - controlled  - cont Norvasc, Lisinopril  - monitor    Paroxysmal  Atrial Fibrillation  - NRS on admission  - AC on Coumadin, pharm to dose (2.21)    Hypothyroidism  - home dose of synthroid 112 mcg      HLD  - cont Crestor    Hx of CVA  - Left sided weakness  - cont statin    DVT Prophylaxis: Coumadin - pharm to dose  Diet: DIET CARB CONTROL; Carb Control: 4 carb choices (60 gms)/meal  Code Status: Full Code    Karishma Juju FNP-C  10/29/2020

## 2020-10-29 NOTE — CARE COORDINATION
Case Management Assessment  Initial Evaluation      Patient Name: Zoë Whitaker  YOB: 1940  Diagnosis: No admission diagnoses are documented for this encounter. Date / Time: 10/29/2020 11:01 AM    Admission status/Date: ED eval 10/29/2020  Chart Reviewed: Yes      Patient Interviewed: No  Family Interviewed:  VM left w/son, Otoniel Husain      Hospitalization in the last 30 days:  Yes      Health Care Decision Maker : Ag Khan (spouse) [phone] or Otoniel Husain (son) [phone]    Met with: Interview conducted  (bedside/phone):    Current PCP: ANANT Perla    Financial  [employer] Medicare  Precert required for SNF : YES          3 night stay required - WAIVED    ADLS  Support Systems/Care Needs:    Transportation: family    Meal Preparation: family    Housing  Living Arrangements: Lives w/spouse (  is her caregiver)  Steps: ramped  Intent for return to present living arrangements: Yes  Identified Issues: Pt has no 24/7 assistance available. Her daughter is a nurse and works in a [employer] and cannot stay with her. Sons living nearby are not capable to help. Home Care Information  Active with Home Health Care : No      Passport/Waiver : No     Durable Medical Equiptment   DME Provider: JAMIE  Equipment:   Walker___Cane___RTS___ BSC___Shower Chair___Hospital Bed___W/C____Other________  02 at ____Liter(s)---wears(frequency)_______ [facility] ___ CPAP___ BiPap___   N/A____    Home O2 Use :  No      Community Service Affiliation  Dialysis:  No    · Agency:  · Location:  · Dialysis Schedule:  · Phone:   · Fax: Other Community Services:     DISCHARGE PLAN: Explained Case Management role/services. CM chart review and screening for DCP needs triggered by return visit to ED for SOB. +C19. No hypoxia. Pt is here with her spouse who is also C19 positive and is requiring supplemental O2. She does not. However Mrs Claudette Méndez (h/o CVA) is dependant on Mr Claudette Méndez to assist with ALDS and cannot return home alone. CM is following and will assist w/ DCP. ADDENDUM: Return call from son Wily Ramirez) per Víctor Dixon Mrs Dalia Sandoval has a daughter Tyler Patel [phone] who is a nurse in a local nursing home. Call placed to Kyra Batista, She is not able to quarantine with patient due the fact that she is a nurse in a local nursing home, she has 2 brothers who live nearby but they would not be able to adequately or safely care for the patient. They may consider SNF placement. [facility] is the closest facility that accepts COVID + patients. Pt will need PT/OT eval for STR. Admission order noted. +CM following    Referral called to [facility] @ [facility]. Faxed face sheet: per Care link  Bed available?: Yes  Insurance precertification required? Yes  Continuity of Care Form (Alexandria Dial) initiated? no  Hospital Exemption Notification (HENS) initiated? No    Initiate Level of Care (LOC), if Medicaid is the primary payer?  No

## 2020-10-29 NOTE — ED TRIAGE NOTES
Pt tested COVID + last week and was admitted for 7 days. Pt was discharged yesterday from here inpatient and received a call from her Dr this morning in which pt told Dr she still wasn't feeling \"well\" Dr sent pt and her  in for further evaluation.  Pt states that nothing is different since discharge she just still isnt feeling well from \"the virus\"

## 2020-10-29 NOTE — PROGRESS NOTES
Admit to med-surg, obs   admitted.  Unable to care for self at home  PT/OT for placement    [facility]  10/29/2020

## 2020-10-29 NOTE — PROGRESS NOTES
Pharmacy to Dose Warfarin    Dx: Afib  Goal INR range 2-3   Home Warfarin dose:  Warfarin 7.5 mg every Tuesday, Thursday, and Saturday      Date  INR  Warfarin  10/29/20          2.21                 7.5 mg (taken at home today prior to admission)    Warfarin 7.5 mg taken at home prior to admission. Daily INR ordered. Rx will continue to manage therapy per consult order. Kanika Osborne Pharm. D. 10/29/2020 5:43 PM

## 2020-10-30 LAB
ANION GAP SERPL CALCULATED.3IONS-SCNC: 9 MMOL/L (ref 3–16)
BUN BLDV-MCNC: 10 MG/DL (ref 7–20)
CALCIUM SERPL-MCNC: 8.1 MG/DL (ref 8.3–10.6)
CHLORIDE BLD-SCNC: 101 MMOL/L (ref 99–110)
CO2: 25 MMOL/L (ref 21–32)
CREAT SERPL-MCNC: 0.6 MG/DL (ref 0.6–1.2)
GFR AFRICAN AMERICAN: >60
GFR NON-AFRICAN AMERICAN: >60
GLUCOSE BLD-MCNC: 141 MG/DL (ref 70–99)
GLUCOSE BLD-MCNC: 160 MG/DL (ref 70–99)
GLUCOSE BLD-MCNC: 199 MG/DL (ref 70–99)
GLUCOSE BLD-MCNC: 211 MG/DL (ref 70–99)
GLUCOSE BLD-MCNC: 216 MG/DL (ref 70–99)
HCT VFR BLD CALC: 39.3 % (ref 36–48)
HEMOGLOBIN: 13.1 G/DL (ref 12–16)
INR BLD: 2.05 (ref 0.86–1.14)
MAGNESIUM: 1.8 MG/DL (ref 1.8–2.4)
MCH RBC QN AUTO: 29.9 PG (ref 26–34)
MCHC RBC AUTO-ENTMCNC: 33.4 G/DL (ref 31–36)
MCV RBC AUTO: 89.4 FL (ref 80–100)
PDW BLD-RTO: 13.7 % (ref 12.4–15.4)
PERFORMED ON: ABNORMAL
PLATELET # BLD: 201 K/UL (ref 135–450)
PMV BLD AUTO: 9.1 FL (ref 5–10.5)
POTASSIUM REFLEX MAGNESIUM: 3.4 MMOL/L (ref 3.5–5.1)
PROTHROMBIN TIME: 24 SEC (ref 10–13.2)
RBC # BLD: 4.39 M/UL (ref 4–5.2)
SODIUM BLD-SCNC: 135 MMOL/L (ref 136–145)
WBC # BLD: 7.3 K/UL (ref 4–11)

## 2020-10-30 PROCEDURE — G0378 HOSPITAL OBSERVATION PER HR: HCPCS

## 2020-10-30 PROCEDURE — 97530 THERAPEUTIC ACTIVITIES: CPT

## 2020-10-30 PROCEDURE — 97166 OT EVAL MOD COMPLEX 45 MIN: CPT

## 2020-10-30 PROCEDURE — 99224 PR SBSQ OBSERVATION CARE/DAY 15 MINUTES: CPT | Performed by: INTERNAL MEDICINE

## 2020-10-30 PROCEDURE — 80048 BASIC METABOLIC PNL TOTAL CA: CPT

## 2020-10-30 PROCEDURE — 2500000003 HC RX 250 WO HCPCS: Performed by: NURSE PRACTITIONER

## 2020-10-30 PROCEDURE — 6370000000 HC RX 637 (ALT 250 FOR IP): Performed by: NURSE PRACTITIONER

## 2020-10-30 PROCEDURE — 97116 GAIT TRAINING THERAPY: CPT

## 2020-10-30 PROCEDURE — 2580000003 HC RX 258: Performed by: NURSE PRACTITIONER

## 2020-10-30 PROCEDURE — 36415 COLL VENOUS BLD VENIPUNCTURE: CPT

## 2020-10-30 PROCEDURE — 85610 PROTHROMBIN TIME: CPT

## 2020-10-30 PROCEDURE — 85027 COMPLETE CBC AUTOMATED: CPT

## 2020-10-30 PROCEDURE — 97535 SELF CARE MNGMENT TRAINING: CPT

## 2020-10-30 PROCEDURE — 97162 PT EVAL MOD COMPLEX 30 MIN: CPT

## 2020-10-30 PROCEDURE — 83735 ASSAY OF MAGNESIUM: CPT

## 2020-10-30 RX ADMIN — AMLODIPINE BESYLATE 5 MG: 5 TABLET ORAL at 10:30

## 2020-10-30 RX ADMIN — Medication 10 ML: at 20:06

## 2020-10-30 RX ADMIN — ROSUVASTATIN CALCIUM 10 MG: 10 TABLET, FILM COATED ORAL at 10:30

## 2020-10-30 RX ADMIN — B-COMPLEX W/ C & FOLIC ACID TAB 1 TABLET: TAB at 10:30

## 2020-10-30 RX ADMIN — LEVOTHYROXINE SODIUM 112 MCG: 112 TABLET ORAL at 05:11

## 2020-10-30 RX ADMIN — Medication 10 ML: at 10:30

## 2020-10-30 RX ADMIN — MICONAZOLE NITRATE: 20 POWDER TOPICAL at 10:39

## 2020-10-30 RX ADMIN — MICONAZOLE NITRATE: 20 POWDER TOPICAL at 20:07

## 2020-10-30 RX ADMIN — INSULIN LISPRO 44 UNITS: 100 INJECTION, SUSPENSION SUBCUTANEOUS at 18:11

## 2020-10-30 RX ADMIN — INSULIN LISPRO 1 UNITS: 100 INJECTION, SOLUTION INTRAVENOUS; SUBCUTANEOUS at 20:07

## 2020-10-30 RX ADMIN — CYANOCOBALAMIN TAB 500 MCG 1000 MCG: 500 TAB at 10:30

## 2020-10-30 RX ADMIN — MAGNESIUM GLUCONATE 500 MG ORAL TABLET 400 MG: 500 TABLET ORAL at 10:30

## 2020-10-30 RX ADMIN — LISINOPRIL 40 MG: 20 TABLET ORAL at 10:29

## 2020-10-30 NOTE — PROGRESS NOTES
Inpatient Physical Therapy Evaluation and Treatment    Unit: [address]  Date:  10/30/2020  Patient Name:    Marcel Grove  Admitting diagnosis:  Weakness [R53.1]  Admit Date:  10/29/2020  Precautions/Restrictions/WB Status/ Lines/ Wounds/ Oxygen: Fall risk, Bed/chair alarm, Telemetry and Isolation Precautions: Droplet Plus - COVID, telesitter    Treatment Time:  15:40-16:15  Treatment Number:  1   Timed Code Treatment Minutes: 25 minutes  Total Treatment Minutes:  35  minutes    Patient Goals for Therapy: \" go to rehab \"          Discharge Recommendations: SNF - pt's spouse is currently hospitalized and pt does not have 24/7 assist/supervision from remaining family  DME needs for discharge: defer to facility       Therapy recommendation for EMS Transport: can transport by wheelchair    Therapy recommendations for staff:   Assist of 1 with use of rolling walker (RW) and gait belt for all transfers and ambulation to/from bathroom    History of Present Illness: Per H&P: 80 y.o. female with a PMH of PAF (AC on Coumadin), Hx of CVA, DM Type II, HTN, Hypothyroidism who presented to [facility] with complaint of SOB after recently being discharged from the hospital (see below). She reports feeling tired and weak.  She has some nausea and poor appetite.  She has a cough and feels short of breath.  Denies chest pain, fever, vomiting, or diarrhea. Of note: Pt was recently discharged from [facility] after being admitted from 10/21-10/27 for acute respiratory failure 2/2 COVID 19 infection. Pt had persistent high fevers and required 2L NC. She was treated with Decadron, 3 days of Remdesivir and 2 units of CCP. Pt was weaned to RA at discharge. Work up in the ED showed: Pt was afebrile with stable BP and HR. Pt on RA. BG was elevated at 242. No leukocytosis. INR was 2.21 - pt is AC on Coumadin. CXR showed moderate pulmonary vascular congestion similar to prior study. EKG showed NSR with sinus arrhythmia.  Pt was admitted to Med Surg for further management. Home Health S4 Level Recommendation:  NA  AM-PAC Mobility Score    AM-PAC Inpatient Mobility Raw Score : 17       Preadmission Environment    Pt. Xiomara Zaragoza family (spouse and 2 sons) ( currently sick with Josafat but is typically pt's caregiver)  Home environment:    two story home (bedroom and bath on 1st floor)   Steps to enter first floor:   No steps,      Steps to second floor: N/A  Bathroom:       Tub/Shower unit, Grab bars, Shower bench  and Raised toilet seat w/ arms  Equipment owned:      SPC, Rollator  and hospital bed      Preadmission Status / PLOF:  History of falls             No  Pt. Able to drive          No - spouse drives  Pt Fully independent with ADL's         No  Pt. recently Required assistance from family for:  Bathing, Cleaning, Cooking, Dressing and Laundry      However patient typically able to dress self, assist for bathing, enjoys cooking   Pt. Fully independent for transfers and gait and walked with: uses [employer] chair    Pt states she walks with her Rollator intermittently    Pain   No    Cognition    A&O Person, Place and Time (20th, states December and then self corrects)   Able to follow 1 step commands    Subjective  Patient lying supine in bed with no family present. Pt agreeable to this PT eval & tx. Upper Extremity ROM/Strength  Please see OT evaluation.       Lower Extremity ROM / Strength   AROM WFL: No  ROM limitations: limited to ~-5 deg DF on L ankle 2/2 gastrocnemius tightness (able to reach neutral with knee flexed vs extended)    Strength Assessment (measured on a 0-5 scale):  R LE   Quad   5/5   Ant Tib  5/5   Hamstring 4+   Iliopsoas 4+  L LE  Quad   4   Ant Tib  0   Hamstring 3   Iliopsoas 3-    Lower Extremity Sensation    WNL    Lower Extremity Proprioception:   WNL    Coordination and Tone  WNL    Balance  Sitting:  Good - ; SBA  Comments: for static and dynamic sitting at EOB   Pt able to reach inside and outside DOLORES sitting EOB and return to midline (increased time to return to midline with forward bending)    Standing: Fair +; CGA  Comments: 1 minute with B UE support on RW (requires assist to place L UE on walker), keeps L LE abducted and slightly forward due to decreased ankle DF    Bed Mobility   Supine to Sit:    Supervision with HOB and use of R LE to assist with moving L LE  Sit to Supine:   SBA to flat bed with use of R LE to move L LE  Rolling:   Not Tested  Scooting in sitting: Supervision  Scooting in supine:  Not Tested    Transfer Training     Sit to stand:   CGA  Stand to sit:   CGA  Bed to Chair:   Not Tested with use of N/A    Gait gait completed as indicated below  Distance:      20 ft  Deviations (firm surface/linoleum):  decreased thao, increased DOLORES, forward flexed posture, decreased step length on right, decreased stance time on left and circumduction of L LE during swing phase, decreased L step height due to foot drop, adduction compensation on L LE for hip flexion, maintains slight ER of L foot, locks L knee into hyperextension for stance phase due to decreased quad strength  Assistive Device Used:    gait belt and rolling walker (RW)  Level of Assist:    CGA  Comment: pt would likely benefit from trialing an AFO or ACE wrap on L LE    Stair Training deferred, pt unsafe/ not appropriate to complete stairs at this time    Activity Tolerance   Pt completed therapy session with SOB noted with ambulation   Supine (at rest) SpO2: 91% on RA  HR: 75 bpm  BP: 155/73     Pt was able to maintain SpO2 >90% on RA during ambulation and with further bed mobility. Positioning Needs   Pt in bed, alarm set, positioned in proper neutral alignment and pressure relief provided. Call light provided and all needs within reach    Exercises Initiated  Neeryda deferred secondary to treatment focus on functional mobility  NA    Other  None.      Patient/Family Education   Pt educated on role of inpatient PT, POC, importance of continued activity, DC recommendations, safety awareness, transfer techniques and calling for assist with mobility. Assessment  Pt seen for Physical Therapy evaluation in acute care setting. Pt demonstrated decreased Activity tolerance, Balance, Safety and Strength as well as decreased independence with Ambulation, Bed Mobility  and Transfers. Pt would benefit from trial with L foot AFO or dorsiflexion ACE wrap to address L foot drop. Pt will also need further instruction in L gastroc stretches to improve ROM prior to brace trial.     Recommending SNF upon discharge as patient functioning well below baseline, demonstrates good rehab potential and unable to return home due to limited or no family support, inability to negotiate stairs to enter home/bedroom/bathroom and home environment not conducive to patient recovery. Goals : To be met in 3 visits:  1). Independent with LE Ex x 10 reps  2). Trial ambulation with L foot dorsiflexion ACE wrap x5 ft with Min A and use of RW    To be met in 6 visits:  1). Supine to/from sit: Modified Independent  2). Sit to/from stand: Modified Independent  3). Bed to chair: Independent without AD  4). Gait: Ambulate 25 ft.   with  Modified Independent and use of rolling walker (RW)   5). Tolerate B LE exercises 3 sets of 10-15 reps  6). Stand without UE support for 5 minutes with UE reaching and no more than CGA    Rehabilitation Potential: Good  Strengths for achieving goals include:   Pt motivated, PLOF, Family Support and Pt cooperative   Barriers to achieving goals include:    No Barriers    Plan    To be seen 2-3 x / week  while in acute care setting for therapeutic exercises, bed mobility, transfers, progressive gait training, balance training, and family/patient education. Signature: Sumanth Hodgson, PT, DPT #[id number]    If patient discharges from this facility prior to next visit, this note will serve as the Discharge Summary.

## 2020-10-30 NOTE — PROGRESS NOTES
Pharmacy to Dose Warfarin    Dx: Afib  Goal INR range 2-3   Home Warfarin dose:  Warfarin 7.5 mg every Tuesday, Thursday, and Saturday      Date  INR  Warfarin  10/29/20          2.21                 7.5 mg   10/30/20          2.05                  none  (taken at home today prior to admission)    No warfarin tonight. Pt takes on Tues/Thurs/Sat. Rx will continue to manage therapy per consult order.     Jade [facility], [facility]

## 2020-10-30 NOTE — PLAN OF CARE
Problem: Skin Integrity:  Goal: Will show no infection signs and symptoms  Description: Will show no infection signs and symptoms  10/30/2020 0022 by Jigar Caro RN  Outcome: Ongoing  10/29/2020 1851 by Divya John RN  Outcome: Ongoing  Goal: Absence of new skin breakdown  Description: Absence of new skin breakdown  10/30/2020 0022 by Jigar Caro RN  Outcome: Ongoing  10/29/2020 1851 by Divya John RN  Outcome: Ongoing     Problem: Falls - Risk of:  Goal: Will remain free from falls  Description: Will remain free from falls  10/30/2020 0022 by Jigar Caro RN  Outcome: Ongoing  10/29/2020 1851 by Divya John RN  Outcome: Ongoing  Goal: Absence of physical injury  Description: Absence of physical injury  10/30/2020 0022 by Jigar Caro RN  Outcome: Ongoing  10/29/2020 1851 by Divya John RN  Outcome: Ongoing     Problem: Fatigue  Goal: Verbalize increase energy and improved vitality  10/30/2020 0022 by Jigar Caro RN  Outcome: Ongoing  10/29/2020 1851 by Divya John RN  Outcome: Ongoing     Problem: Loneliness or Risk for Loneliness  Goal: Demonstrate positive use of time alone when socialization is not possible  10/30/2020 0022 by Jigar Caro RN  Outcome: Ongoing  10/29/2020 1851 by Divya John RN  Outcome: Ongoing     Problem: Patient Education: Go to Patient Education Activity  Goal: Patient/Family Education  10/30/2020 0022 by Jigar Caro RN  Outcome: Ongoing  10/29/2020 1851 by Divya John RN  Outcome: Ongoing     Problem: Risk for Fluid Volume Deficit  Goal: Maintain normal heart rhythm  10/30/2020 0022 by Jigar Caro RN  Outcome: Ongoing  10/29/2020 1851 by Divya John RN  Outcome: Ongoing  Goal: Maintain absence of muscle cramping  10/30/2020 0022 by Jigar Caro RN  Outcome: Ongoing  10/29/2020 1851 by Divya John RN  Outcome: Ongoing  Goal: Maintain normal serum potassium, sodium, calcium, phosphorus, and pH  10/30/2020 0022 by Lo Longoria RN  Outcome: Ongoing  10/29/2020 1851 by Sarita Marie RN  Outcome: Ongoing     Problem: Nutrition Deficits  Goal: Optimize nutrtional status  10/30/2020 0022 by Lo Longoria RN  Outcome: Ongoing  10/29/2020 1851 by Sarita Marie RN  Outcome: Ongoing     Problem: Isolation Precautions - Risk of Spread of Infection  Goal: Prevent transmission of infection  10/30/2020 0022 by Lo Longoria RN  Outcome: Ongoing  10/29/2020 1851 by Sarita Marie RN  Outcome: Ongoing     Problem:  Body Temperature -  Risk of, Imbalanced  Goal: Ability to maintain a body temperature within defined limits  10/30/2020 0022 by Lo Longoria RN  Outcome: Ongoing  10/29/2020 1851 by Sarita Marie RN  Outcome: Ongoing  Goal: Will regain or maintain usual level of consciousness  10/30/2020 0022 by Lo Longoria RN  Outcome: Ongoing  10/29/2020 1851 by Sarita Marie RN  Outcome: Ongoing  Goal: Complications related to the disease process, condition or treatment will be avoided or minimized  10/30/2020 0022 by Lo Longoria RN  Outcome: Ongoing  10/29/2020 1851 by Sarita Marie RN  Outcome: Ongoing     Problem: Breathing Pattern - Ineffective  Goal: Ability to achieve and maintain a regular respiratory rate  10/30/2020 0022 by Lo Longoria RN  Outcome: Ongoing  10/29/2020 1851 by Sarita Marie RN  Outcome: Ongoing     Problem: Gas Exchange - Impaired  Goal: Absence of hypoxia  10/30/2020 0022 by Lo Longoria RN  Outcome: Ongoing  10/29/2020 1851 by Sarita Marie RN  Outcome: Ongoing  Goal: Promote optimal lung function  10/30/2020 0022 by Lo Longoria RN  Outcome: Ongoing  10/29/2020 1851 by Sarita Marie RN  Outcome: Ongoing     Problem: Airway Clearance - Ineffective  Goal: Achieve or maintain patent airway  10/30/2020 0022 by Bryce San RN  Outcome: Ongoing  10/29/2020 1851 by Niki Somers RN  Outcome: Ongoing

## 2020-10-30 NOTE — PROGRESS NOTES
Report given and care transferred to [facility]. Patient in stable condition at time of care transfer, no signs or symptoms of distress noted.

## 2020-10-30 NOTE — PROGRESS NOTES
Shift assessment complete; see flow sheet. Scheduled medications administered; See MAR. IV infusing without difficulty. Pt on room air. Pt c/o back pain 5/10 PRN tylenol given at this time. Pt denies any needs at this time. Call light within reach, bed in low locked position, bed alarm on.

## 2020-10-30 NOTE — PROGRESS NOTES
Initiated O2 @ 2L d/t saturation consistently @ 89%. Assessed pt, lungs sound clear, A/O. O2 up to 94% on 2L.

## 2020-10-30 NOTE — PROGRESS NOTES
Handoff report and transfer of care given to [facility]. Pt in stable condition. Call light within reach. Bed locked in lowest position. Denies further needs at this time.

## 2020-10-30 NOTE — PROGRESS NOTES
Inpatient Occupational Therapy  Evaluation and Treatment    Unit: [facility]  Date:  10/30/2020  Patient Name:    Loreto Amador  Admitting diagnosis:  Weakness [R53.1]  Admit Date:  10/29/2020  Precautions/Restrictions/WB Status/ Lines/ Wounds/ Oxygen: fall risk, bed/chair alarm, supplemental O2 (2L), telemetry, continuous pulse ox and Droplet Plus precautions (+ COVID 19)    Treatment Time:  0915-0958  Treatment Number: 1     Billable Treatment Time: 33 minutes   Total Treatment Time:   43   minutes    Patient Goals for Therapy:  \" go home \"      Discharge Recommendations: SNF  DME needs for discharge: defer to facility       Therapy recommendations for staff:   Assist of 1 with use of rolling walker (RW) for all transfers to/from BSC/chair    History of Present Illness:  Per H&P: 79 y.o. female with a PMH of PAF (AC on Coumadin), Hx of CVA, DM Type II, HTN, Hypothyroidism who presented to [facility] ED with complaint of SOB after recently being discharged from the hospital (see below). She reports feeling tired and weak. She has some nausea and poor appetite. She has a cough and feels short of breath. Denies chest pain, fever, vomiting, or diarrhea.     Of note: Pt was recently discharged from [facility] after being admitted from 10/21-10/27 for acute respiratory failure 2/2 COVID 19 infection. Pt had persistent high fevers and required 2L NC. She was treated with Decadron, 3 days of Remdesivir and 2 units of CCP. Pt was weaned to RA at discharge.     Work up in the ED showed: Pt was afebrile with stable BP and HR. Pt on RA. BG was elevated at 242. No leukocytosis. INR was 2.21 - pt is AC on Coumadin. CXR showed moderate pulmonary vascular congestion similar to prior study. EKG showed NSR with sinus arrhythmia. Pt was admitted to Med Surg for further management. Home Health S4 Level Recommendation:  Level 1 Standard  AM-PAC Score: AM-PAC Inpatient Daily Activity Raw Score: 16    Preadmission Environment    Pt.  Lives with family (spouse and 2 sons) ( currently sick with Josafat but typically caregiver)  Home environment:    two story home (bedroom and bath on 1st floor)   Steps to enter first floor:   No steps,      Steps to second floor: N/A  Bathroom:       Tub/Shower unit, Grab bars, Shower bench  and Raised toilet seat w/ arms  Equipment owned:      [facility], [facility] [address]  and hospital bed      Preadmission Status / PLOF:  History of falls             No  Pt. Able to drive          No  Pt Fully independent with ADL's         No  Pt. recently Required assistance from family for:  Bathing, Cleaning, Cooking, Dressing and Laundry     However patient typically able to dress self, assist for bathing, enjoys cooking   Pt. Fully independent for transfers and gait and walked with: uses [employer] chair      Pain  No  Rating:NA  Location:  Pain Medicine Status: Denies need      Cognition    A&O x4   Able to follow 2 step commands    Subjective  Patient lying supine in bed with no family present - no visitors present due to COVID-19 restrictions  Pt agreeable to this OT eval & tx. Upper Extremity ROM:    Impaired L hx CVA, limited AROM shld flexion (able to elevate scapula), PROM 0-90, AROM flexion 0-40, able to make a full fist, unable to actively extend fingers.     R UE WFLs AROM    Upper Extremity Strength:    Impaired strength L UE, and WFLs R UE    Upper Extremity Sensation    reports no NT in bilateral UEs    Upper Extremity Proprioception:  NT    Coordination and Tone  impaired left UE due to hx of CVA    Balance  Functional Sitting Balance:  Impaired initially CGA sitting at EOB  Functional Standing Balance:Impaired Min assist    Bed mobility:    Supine to sit:   Min A  Sit to supine:   Not Tested  Rolling:    Not Tested  Scooting in sitting:  Min A  Scooting to head of bed:   Not Tested    Bridging:   Not Tested    Transfers:    Sit to stand:  Min A  Stand to sit:  Min A  Bed to chair:   Min A  Standard toilet: Not Tested  Bed to BSC:  Not Tested, reports just using BSC with nursing prior to session    Dressing:      UE:   Not Tested  LE:    Mod A    Bathing:    UE:  Not Tested  LE:  Not Tested    Eating:   Not Tested    Toileting:  Not Tested    Activity Tolerance   Pt completed therapy session with No adverse symptoms noted w/activity  SpO2: 94% 2L O2  HR: 69  BP: 129/69    Positioning Needs:   Reclined in chair with call light and needs in reach. Alarm Set    Exercise / Activities Initiated:   N/A    Patient/Family Education:   Role of OT  Recommendations for DC  Safe RW use/hand placement    Assessment of Deficits: Pt seen for Occupational therapy evaluation in acute care setting. Pt demonstrated decreased Activity tolerance, ADLs, IADLs, Balance , Bed mobility, ROM, Safety Awareness, Strength, Transfers and Coping Skills. Pt functioning below baseline and will likely benefit from skilled occupational therapy services to maximize safety and independence. Goal(s) : To be met in 3 Visits:  1). Bed to toilet/BSC: CGA    To be met in 5 Visits:  1). Supine to/from Sit:  CGA  2). Upper Body Bathing:   SBA  3). Lower Body Bathing:   Min A  4). Upper Body Dressing:  SBA  5). Lower Body Dressing:  Min A  6). Pt to demonstrate UE exs x 15 reps with minimal cues    Rehabilitation Potential:  Good for goals listed above. Strengths for achieving goals include: Pt motivated  Barriers to achieving goals include:  Weakness     Plan: To be seen 3-5 x/wk while in acute care setting for therapeutic exercises, bed mobility, transfers, dressing, bathing, family/patient education, ADL/IADL retraining, energy conservation training.      Kimi Hardy OTR/L [id number]        If patient discharges from this facility prior to next visit, this note will serve as the Discharge Summary

## 2020-10-30 NOTE — PLAN OF CARE
Problem: Airway Clearance - Ineffective  Goal: Achieve or maintain patent airway  10/30/2020 1202 by Miller Rebollar RN  Outcome: Ongoing  10/30/2020 0022 by Tawny Au RN  Outcome: Ongoing     Problem: Gas Exchange - Impaired  Goal: Absence of hypoxia  10/30/2020 1202 by Miller Rebollar RN  Outcome: Ongoing  10/30/2020 0022 by Tawny Au RN  Outcome: Ongoing  Goal: Promote optimal lung function  10/30/2020 1202 by Miller Rebollar RN  Outcome: Ongoing  10/30/2020 0022 by Tawny Au RN  Outcome: Ongoing     Problem: Breathing Pattern - Ineffective  Goal: Ability to achieve and maintain a regular respiratory rate  10/30/2020 1202 by Miller Rebollra RN  Outcome: Ongoing  10/30/2020 0022 by Tawny Au RN  Outcome: Ongoing     Problem:  Body Temperature -  Risk of, Imbalanced  Goal: Ability to maintain a body temperature within defined limits  10/30/2020 1202 by Miller Rebollar RN  Outcome: Ongoing  10/30/2020 0022 by Tawny Au RN  Outcome: Ongoing  Goal: Will regain or maintain usual level of consciousness  10/30/2020 1202 by Miller Rebollar RN  Outcome: Ongoing  10/30/2020 0022 by Tawny Au RN  Outcome: Ongoing  Goal: Complications related to the disease process, condition or treatment will be avoided or minimized  10/30/2020 1202 by Miller Rebollar RN  Outcome: Ongoing  10/30/2020 0022 by Tawny Au RN  Outcome: Ongoing     Problem: Isolation Precautions - Risk of Spread of Infection  Goal: Prevent transmission of infection  10/30/2020 1202 by Miller Rebollar RN  Outcome: Ongoing  10/30/2020 0022 by Tawny Au RN  Outcome: Ongoing     Problem: Nutrition Deficits  Goal: Optimize nutrtional status  10/30/2020 1202 by Miller Rebollar RN  Outcome: Ongoing  10/30/2020 0022 by Tawny Au RN  Outcome: Ongoing     Problem: Risk for Fluid Volume Deficit  Goal: Maintain normal heart rhythm  10/30/2020 1202 by Jennifer Aldana RN  Outcome: Ongoing  10/30/2020 0022 by Suad Mello RN  Outcome: Ongoing  Goal: Maintain absence of muscle cramping  10/30/2020 1202 by Jennifer Aldana RN  Outcome: Ongoing  10/30/2020 0022 by Suad Mello RN  Outcome: Ongoing  Goal: Maintain normal serum potassium, sodium, calcium, phosphorus, and pH  10/30/2020 1202 by Jennifer Aldana RN  Outcome: Ongoing  10/30/2020 0022 by Suad Mello RN  Outcome: Ongoing     Problem: Loneliness or Risk for Loneliness  Goal: Demonstrate positive use of time alone when socialization is not possible  10/30/2020 1202 by Jennifer Aldana RN  Outcome: Ongoing  10/30/2020 0022 by Suad Mello RN  Outcome: Ongoing     Problem: Fatigue  Goal: Verbalize increase energy and improved vitality  10/30/2020 1202 by Jennifer Aldana RN  Outcome: Ongoing  10/30/2020 0022 by Suad Mello RN  Outcome: Ongoing     Problem: Patient Education: Go to Patient Education Activity  Goal: Patient/Family Education  10/30/2020 1202 by Jennifer Aldana RN  Outcome: Ongoing  10/30/2020 0022 by Suad Mello RN  Outcome: Ongoing     Problem: Falls - Risk of:  Goal: Will remain free from falls  Description: Will remain free from falls  10/30/2020 1202 by Jennifer Aldana RN  Outcome: Ongoing  10/30/2020 0022 by Suad Mello RN  Outcome: Ongoing  Goal: Absence of physical injury  Description: Absence of physical injury  10/30/2020 1202 by Jennifer Aldana RN  Outcome: Ongoing  10/30/2020 0022 by Suad Mello RN  Outcome: Ongoing     Problem: Skin Integrity:  Goal: Will show no infection signs and symptoms  Description: Will show no infection signs and symptoms  10/30/2020 1202 by Jennifer Aldana RN  Outcome: Ongoing  10/30/2020 0022 by Suad Mello RN  Outcome: Ongoing  Goal: Absence of new skin breakdown  Description: Absence of new skin breakdown  10/30/2020 1202 by Riley Isabel, RN  Outcome: Ongoing  10/30/2020 0022 by Tyesha Bear, RN  Outcome: Ongoing

## 2020-10-30 NOTE — PROGRESS NOTES
Progress Note    Admit Date:  10/29/2020    Admitted for placement needs with recent COVID + infection. Subjective:  Ms. Isa Perez    Objective:   Patient Vitals for the past 4 hrs:   BP Temp Temp src Pulse Resp SpO2   10/30/20 0812 (!) 141/71 97.7 °F (36.5 °C) Oral 69 18 96 %        No intake or output data in the 24 hours ending 10/30/20 1033    Physical Exam:  Spoke with the patient over the phone. Gen: No distress. Alert. Denies complaints       Scheduled Meds:   amLODIPine  5 mg Oral Daily    vitamin B complex w/C  1 tablet Oral Daily    insulin lispro protamine & lispro  44 Units Subcutaneous BID WC    levothyroxine  112 mcg Oral Daily    lisinopril  40 mg Oral Daily    rosuvastatin  10 mg Oral Daily    vitamin B-12  1,000 mcg Oral Daily    sodium chloride flush  10 mL Intravenous 2 times per day    insulin lispro  0-12 Units Subcutaneous TID WC    insulin lispro  0-6 Units Subcutaneous Nightly    magnesium oxide  400 mg Oral Daily    warfarin (COUMADIN) daily dosing (placeholder)   Other RX Placeholder    miconazole   Topical BID       Continuous Infusions:   dextrose         PRN Meds:  sodium chloride flush, acetaminophen **OR** acetaminophen, polyethylene glycol, promethazine **OR** ondansetron, glucose, dextrose, glucagon (rDNA), dextrose      Data:  CBC:   Recent Labs     10/29/20  1152 10/30/20  0606   WBC 7.0 7.3   HGB 13.4 13.1   HCT 39.5 39.3   MCV 87.4 89.4    201     BMP:   Recent Labs     10/29/20  1152 10/30/20  0606    135*   K 3.6 3.4*    101   CO2 25 25   BUN 12 10   CREATININE 0.7 0.6     LIVER PROFILE:   Recent Labs     10/29/20  1152   AST 21   ALT 19   BILITOT 0.5   ALKPHOS 74     PT/INR:   Recent Labs     10/29/20  1152 10/30/20  0606   PROTIME 25.9* 24.0*   INR 2.21* 2.05*       CULTURES  None      RADIOLOGY  XR CHEST PORTABLE   Final Result   Moderate pulmonary vascular congestion similar to the prior study. No   pleural effusion. Assessment/Plan:  COVID 19 Infection  - recently discharged on 10/27 on RA & received Decadron, Remdesivir x 3 days, 2 u CCP   - she is stable on RA. Will monitor on continuous pulse ox. - CXR with moderate pulmonary vascular congestion  - Admitted to Med Surg, droplet + precautions,cont pulse ox and tele     Weakness  - PT/OT  - needs placement  - unable to care for herself at home--> is currently admitted as well with COVID and hypoxia       Diarrhea  - she denies diarrhea at this time  - most likely due to above. Will monitor.      Hypocalcemia  - 7.8,  2/2 low albumin  - corrects to 8.4, no intervention necesssary     Type II DM  - uncontrolled  - cont Humalog mix - 44 units BID, SSI  - POC Glucose, carb control diet     HTN  - controlled  - cont Norvasc, Lisinopril  - monitor     Paroxysmal  Atrial Fibrillation  - NRS on admission  - AC on Coumadin, pharm to dose (2.21)     Hypothyroidism  - home dose of synthroid 112 mcg       HLD  - cont Crestor     Hx of CVA  - Left sided weakness  - cont statin    DVT Prophylaxis: Coumadin   Diet: DIET CARB CONTROL; Carb Control: 4 carb choices (60 gms)/meal  Code Status: Full Code    KRISTIN Raymond.

## 2020-10-31 LAB
GLUCOSE BLD-MCNC: 120 MG/DL (ref 70–99)
GLUCOSE BLD-MCNC: 123 MG/DL (ref 70–99)
GLUCOSE BLD-MCNC: 232 MG/DL (ref 70–99)
GLUCOSE BLD-MCNC: 262 MG/DL (ref 70–99)
INR BLD: 1.8 (ref 0.86–1.14)
PERFORMED ON: ABNORMAL
PROTHROMBIN TIME: 21 SEC (ref 10–13.2)

## 2020-10-31 PROCEDURE — 6370000000 HC RX 637 (ALT 250 FOR IP): Performed by: NURSE PRACTITIONER

## 2020-10-31 PROCEDURE — 99224 PR SBSQ OBSERVATION CARE/DAY 15 MINUTES: CPT | Performed by: INTERNAL MEDICINE

## 2020-10-31 PROCEDURE — 36415 COLL VENOUS BLD VENIPUNCTURE: CPT

## 2020-10-31 PROCEDURE — G0378 HOSPITAL OBSERVATION PER HR: HCPCS

## 2020-10-31 PROCEDURE — 2580000003 HC RX 258: Performed by: NURSE PRACTITIONER

## 2020-10-31 PROCEDURE — 85610 PROTHROMBIN TIME: CPT

## 2020-10-31 RX ORDER — WARFARIN SODIUM 7.5 MG/1
7.5 TABLET ORAL
Status: COMPLETED | OUTPATIENT
Start: 2020-10-31 | End: 2020-10-31

## 2020-10-31 RX ADMIN — ROSUVASTATIN CALCIUM 10 MG: 10 TABLET, FILM COATED ORAL at 09:03

## 2020-10-31 RX ADMIN — LISINOPRIL 40 MG: 20 TABLET ORAL at 09:03

## 2020-10-31 RX ADMIN — B-COMPLEX W/ C & FOLIC ACID TAB 1 TABLET: TAB at 09:03

## 2020-10-31 RX ADMIN — Medication 10 ML: at 09:03

## 2020-10-31 RX ADMIN — LEVOTHYROXINE SODIUM 112 MCG: 112 TABLET ORAL at 05:52

## 2020-10-31 RX ADMIN — MICONAZOLE NITRATE: 20 POWDER TOPICAL at 12:22

## 2020-10-31 RX ADMIN — MAGNESIUM GLUCONATE 500 MG ORAL TABLET 400 MG: 500 TABLET ORAL at 09:03

## 2020-10-31 RX ADMIN — CYANOCOBALAMIN TAB 500 MCG 1000 MCG: 500 TAB at 09:03

## 2020-10-31 RX ADMIN — WARFARIN 7.5 MG: 7.5 TABLET ORAL at 17:30

## 2020-10-31 RX ADMIN — ACETAMINOPHEN 650 MG: 325 TABLET ORAL at 03:33

## 2020-10-31 RX ADMIN — INSULIN LISPRO 44 UNITS: 100 INJECTION, SUSPENSION SUBCUTANEOUS at 17:32

## 2020-10-31 RX ADMIN — AMLODIPINE BESYLATE 5 MG: 5 TABLET ORAL at 09:03

## 2020-10-31 ASSESSMENT — PAIN SCALES - GENERAL
PAINLEVEL_OUTOF10: 0
PAINLEVEL_OUTOF10: 8

## 2020-10-31 ASSESSMENT — PAIN DESCRIPTION - LOCATION: LOCATION: BACK

## 2020-10-31 ASSESSMENT — PAIN DESCRIPTION - PAIN TYPE: TYPE: CHRONIC PAIN

## 2020-10-31 ASSESSMENT — PAIN DESCRIPTION - ORIENTATION: ORIENTATION: LOWER;MID

## 2020-10-31 NOTE — PLAN OF CARE
Problem: Airway Clearance - Ineffective  Goal: Achieve or maintain patent airway  10/31/2020 1109 by Deanna Linda RN  Outcome: Ongoing  10/31/2020 0241 by Phil Sanders RN  Outcome: Ongoing     Problem: Gas Exchange - Impaired  Goal: Absence of hypoxia  10/31/2020 1109 by Deanna Linda RN  Outcome: Ongoing  10/31/2020 0241 by Phil Sanders RN  Outcome: Ongoing  Goal: Promote optimal lung function  10/31/2020 1109 by Deanna Linda RN  Outcome: Ongoing  10/31/2020 0241 by Phil Sanders RN  Outcome: Ongoing     Problem: Breathing Pattern - Ineffective  Goal: Ability to achieve and maintain a regular respiratory rate  10/31/2020 1109 by Deanna Linda RN  Outcome: Ongoing  10/31/2020 0241 by Phil Sanders RN  Outcome: Ongoing     Problem:  Body Temperature -  Risk of, Imbalanced  Goal: Ability to maintain a body temperature within defined limits  10/31/2020 1109 by Deanna Linda RN  Outcome: Ongoing  10/31/2020 0241 by Phil Sanders RN  Outcome: Ongoing  Goal: Will regain or maintain usual level of consciousness  10/31/2020 1109 by Deanna Linda RN  Outcome: Ongoing  10/31/2020 0241 by Phil Sanders RN  Outcome: Ongoing  Goal: Complications related to the disease process, condition or treatment will be avoided or minimized  10/31/2020 1109 by Deanna Linda RN  Outcome: Ongoing  10/31/2020 0241 by Phil Sanders RN  Outcome: Ongoing     Problem: Isolation Precautions - Risk of Spread of Infection  Goal: Prevent transmission of infection  10/31/2020 1109 by Deanna Linda RN  Outcome: Ongoing  10/31/2020 0241 by Phil Sanders RN  Outcome: Ongoing     Problem: Nutrition Deficits  Goal: Optimize nutrtional status  10/31/2020 1109 by Deanna Linda RN  Outcome: Ongoing  10/31/2020 0241 by Phil Sanders RN  Outcome: Ongoing     Problem: Risk for Fluid Volume Deficit  Goal: Maintain normal heart rhythm  10/31/2020 1109 by Timmy Sung RN  Outcome: Ongoing  10/31/2020 0241 by Zhane Arthur RN  Outcome: Ongoing  Goal: Maintain absence of muscle cramping  10/31/2020 1109 by Timmy Sung RN  Outcome: Ongoing  10/31/2020 0241 by Zhane Arthur RN  Outcome: Ongoing  Goal: Maintain normal serum potassium, sodium, calcium, phosphorus, and pH  10/31/2020 1109 by Timmy Sung RN  Outcome: Ongoing  10/31/2020 0241 by Zhane Arthur RN  Outcome: Ongoing     Problem: Loneliness or Risk for Loneliness  Goal: Demonstrate positive use of time alone when socialization is not possible  10/31/2020 1109 by Timmy Sung RN  Outcome: Ongoing  10/31/2020 0241 by Zhane Arthur RN  Outcome: Ongoing     Problem: Fatigue  Goal: Verbalize increase energy and improved vitality  10/31/2020 1109 by Timmy Sung RN  Outcome: Ongoing  10/31/2020 0241 by Zhane Arthur RN  Outcome: Ongoing     Problem: Patient Education: Go to Patient Education Activity  Goal: Patient/Family Education  10/31/2020 1109 by Timmy Sung RN  Outcome: Ongoing  10/31/2020 0241 by Zhane Arthur RN  Outcome: Ongoing     Problem: Falls - Risk of:  Goal: Will remain free from falls  Description: Will remain free from falls  10/31/2020 1109 by Timmy Sung RN  Outcome: Ongoing  10/31/2020 0241 by Zhane Arthur RN  Outcome: Ongoing  Goal: Absence of physical injury  Description: Absence of physical injury  10/31/2020 1109 by Timmy Sung RN  Outcome: Ongoing  10/31/2020 0241 by Zhane Arthur RN  Outcome: Ongoing     Problem: Skin Integrity:  Goal: Will show no infection signs and symptoms  Description: Will show no infection signs and symptoms  10/31/2020 1109 by Timmy Sung RN  Outcome: Ongoing  10/31/2020 0241 by Zhane Arthur RN  Outcome: Ongoing  Goal: Absence of new skin breakdown  Description: Absence of new skin breakdown  10/31/2020 1109 by Timmy Sung RN  Outcome: Ongoing  10/31/2020 0241 by Shantell Rodrigues RN  Outcome: Ongoing     Problem: Pain:  Goal: Pain level will decrease  Description: Pain level will decrease  Outcome: Ongoing  Goal: Control of acute pain  Description: Control of acute pain  Outcome: Ongoing  Goal: Control of chronic pain  Description: Control of chronic pain  Outcome: Ongoing

## 2020-10-31 NOTE — PROGRESS NOTES
Report given and care transferred to [facility], RN. Patient in stable condition at time of care transfer, denies needs.

## 2020-10-31 NOTE — PROGRESS NOTES
Progress Note    Admit Date:  10/29/2020    Admitted for placement needs with recent COVID + infection. Subjective:  Ms. Akhil Coates now wants to go home    Objective:   No data found. Intake/Output Summary (Last 24 hours) at 10/31/2020 1320  Last data filed at 10/30/2020 2006  Gross per 24 hour   Intake 10 ml   Output --   Net 10 ml       Physical Exam:  Spoke with the patient over the phone. Gen: No distress. Alert. Denies complaints       Scheduled Meds:   amLODIPine  5 mg Oral Daily    vitamin B complex w/C  1 tablet Oral Daily    insulin lispro protamine & lispro  44 Units Subcutaneous BID WC    levothyroxine  112 mcg Oral Daily    lisinopril  40 mg Oral Daily    rosuvastatin  10 mg Oral Daily    vitamin B-12  1,000 mcg Oral Daily    sodium chloride flush  10 mL Intravenous 2 times per day    insulin lispro  0-12 Units Subcutaneous TID WC    insulin lispro  0-6 Units Subcutaneous Nightly    magnesium oxide  400 mg Oral Daily    warfarin (COUMADIN) daily dosing (placeholder)   Other RX Placeholder    miconazole   Topical BID       Continuous Infusions:   dextrose         PRN Meds:  sodium chloride flush, acetaminophen **OR** acetaminophen, polyethylene glycol, promethazine **OR** ondansetron, glucose, dextrose, glucagon (rDNA), dextrose      Data:  CBC:   Recent Labs     10/29/20  1152 10/30/20  0606   WBC 7.0 7.3   HGB 13.4 13.1   HCT 39.5 39.3   MCV 87.4 89.4    201     BMP:   Recent Labs     10/29/20  1152 10/30/20  0606    135*   K 3.6 3.4*    101   CO2 25 25   BUN 12 10   CREATININE 0.7 0.6     LIVER PROFILE:   Recent Labs     10/29/20  1152   AST 21   ALT 19   BILITOT 0.5   ALKPHOS 74     PT/INR:   Recent Labs     10/29/20  1152 10/30/20  0606 10/31/20  0607   PROTIME 25.9* 24.0* 21.0*   INR 2.21* 2.05* 1.80*       CULTURES  None      RADIOLOGY  XR CHEST PORTABLE   Final Result   Moderate pulmonary vascular congestion similar to the prior study.   No   pleural effusion. Assessment/Plan:  COVID 19 Infection  - recently discharged on 10/27 on RA & received Decadron, Remdesivir x 3 days, 2 u CCP   - she is stable on RA. Will monitor on continuous pulse ox. - CXR with moderate pulmonary vascular congestion  - Admitted to Med Surg, droplet + precautions,cont pulse ox and tele     Weakness  - PT/OT  - needs placement  - unable to care for herself at home--> is currently admitted as well with COVID and hypoxia       Diarrhea  - she denies diarrhea at this time  - most likely due to above. Will monitor.      Hypocalcemia  - 7.8,  2/2 low albumin  - corrects to 8.4, no intervention necesssary     Type II DM  - uncontrolled  - cont Humalog mix - 44 units BID, SSI  - POC Glucose, carb control diet     HTN  - controlled  - cont Norvasc, Lisinopril  - monitor     Paroxysmal  Atrial Fibrillation  - NRS on admission  - AC on Coumadin, pharm to dose (2.21)     Hypothyroidism  - home dose of synthroid 112 mcg       HLD  - cont Crestor     Hx of CVA  - Left sided weakness  - cont statin    DVT Prophylaxis: Coumadin   Diet: DIET CARB CONTROL; Carb Control: 4 carb choices (60 gms)/meal  Code Status: Full Code      Ot/pt. SNF was recommended. Patient refusing SNF. Home with 24/7 care     KRISTIN Raymond.

## 2020-10-31 NOTE — PROGRESS NOTES
Occupational Therapy  Attempted to see patient this pm.  Patient politely declined, states she wants to rest at this time and has no concerns about returning home tomorrow. Will continue to follow if patient remains hospitalized.   Sonny Etienne, OTR/L [id number]

## 2020-10-31 NOTE — CARE COORDINATION
RN CM noted discharge order. Pre cert to [facility] still pending at this time. After talking with Pt she states that a friend is going to take her home and stay with her until her  is discharged. RN CM contacted friend, Eugenia Aguero [phone] who states that she had COVID in August, and she is a caregiver. She expresses concern with possible re exposure if she helps to care for the Pt and is not willing to take that risk. MD notified and aware, and Pt notified and aware. Pt will remain in hospital overnight pending pre cert approval. Pt's alternative option is to go home with her  once he is medically ready for discharge as well. Will follow.

## 2020-10-31 NOTE — PROGRESS NOTES
Patient awake and quiet in bed, on room air. No s/s of distress noted. Patient denies pain when assessed. Call light within reach. Patient denies additional needs. Shift assessment completed. Will continue to monitor.

## 2020-10-31 NOTE — FLOWSHEET NOTE
10/31/20 0820   Vital Signs   Temp 97.1 °F (36.2 °C)   Temp Source Oral   Pulse 69   Heart Rate Source Monitor   Resp 18   /72   BP Location Right upper arm   BP Upper/Lower Upper   Patient Position Semi fowlers   Level of Consciousness 0   MEWS Score 1   Oxygen Therapy   SpO2 94 %   O2 Device None (Room air)     Assessment completed. Patient states that she wants to go home. She states that she has a friend of the family that recently tested positive for COVID that will stay with her and take care of her. I asked her if she was no longer considering a SNF at discharge. She said no, she wants to go home.

## 2020-11-01 LAB
GLUCOSE BLD-MCNC: 129 MG/DL (ref 70–99)
GLUCOSE BLD-MCNC: 177 MG/DL (ref 70–99)
GLUCOSE BLD-MCNC: 185 MG/DL (ref 70–99)
GLUCOSE BLD-MCNC: 301 MG/DL (ref 70–99)
INR BLD: 1.66 (ref 0.86–1.14)
PERFORMED ON: ABNORMAL
PROTHROMBIN TIME: 19.4 SEC (ref 10–13.2)

## 2020-11-01 PROCEDURE — 6370000000 HC RX 637 (ALT 250 FOR IP): Performed by: NURSE PRACTITIONER

## 2020-11-01 PROCEDURE — G0378 HOSPITAL OBSERVATION PER HR: HCPCS

## 2020-11-01 PROCEDURE — 85610 PROTHROMBIN TIME: CPT

## 2020-11-01 PROCEDURE — 87040 BLOOD CULTURE FOR BACTERIA: CPT

## 2020-11-01 PROCEDURE — 99224 PR SBSQ OBSERVATION CARE/DAY 15 MINUTES: CPT | Performed by: PHYSICIAN ASSISTANT

## 2020-11-01 PROCEDURE — 36415 COLL VENOUS BLD VENIPUNCTURE: CPT

## 2020-11-01 PROCEDURE — 2700000000 HC OXYGEN THERAPY PER DAY

## 2020-11-01 PROCEDURE — 94761 N-INVAS EAR/PLS OXIMETRY MLT: CPT

## 2020-11-01 PROCEDURE — 2580000003 HC RX 258: Performed by: NURSE PRACTITIONER

## 2020-11-01 RX ORDER — WARFARIN SODIUM 5 MG/1
5 TABLET ORAL
Status: COMPLETED | OUTPATIENT
Start: 2020-11-01 | End: 2020-11-01

## 2020-11-01 RX ADMIN — Medication 10 ML: at 09:13

## 2020-11-01 RX ADMIN — MAGNESIUM GLUCONATE 500 MG ORAL TABLET 400 MG: 500 TABLET ORAL at 09:14

## 2020-11-01 RX ADMIN — B-COMPLEX W/ C & FOLIC ACID TAB 1 TABLET: TAB at 09:14

## 2020-11-01 RX ADMIN — CYANOCOBALAMIN TAB 500 MCG 1000 MCG: 500 TAB at 09:13

## 2020-11-01 RX ADMIN — ACETAMINOPHEN 650 MG: 325 TABLET ORAL at 20:59

## 2020-11-01 RX ADMIN — MICONAZOLE NITRATE: 20 POWDER TOPICAL at 09:14

## 2020-11-01 RX ADMIN — MICONAZOLE NITRATE: 20 POWDER TOPICAL at 00:00

## 2020-11-01 RX ADMIN — LISINOPRIL 40 MG: 20 TABLET ORAL at 09:13

## 2020-11-01 RX ADMIN — LEVOTHYROXINE SODIUM 112 MCG: 112 TABLET ORAL at 05:16

## 2020-11-01 RX ADMIN — WARFARIN SODIUM 5 MG: 5 TABLET ORAL at 17:14

## 2020-11-01 RX ADMIN — AMLODIPINE BESYLATE 5 MG: 5 TABLET ORAL at 09:14

## 2020-11-01 RX ADMIN — ROSUVASTATIN CALCIUM 10 MG: 10 TABLET, FILM COATED ORAL at 09:14

## 2020-11-01 RX ADMIN — Medication 10 ML: at 00:01

## 2020-11-01 ASSESSMENT — PAIN DESCRIPTION - PROGRESSION: CLINICAL_PROGRESSION: NOT CHANGED

## 2020-11-01 ASSESSMENT — PAIN DESCRIPTION - PAIN TYPE: TYPE: ACUTE PAIN

## 2020-11-01 ASSESSMENT — PAIN SCALES - GENERAL
PAINLEVEL_OUTOF10: 8
PAINLEVEL_OUTOF10: 2

## 2020-11-01 ASSESSMENT — PAIN DESCRIPTION - ORIENTATION: ORIENTATION: LOWER

## 2020-11-01 ASSESSMENT — PAIN DESCRIPTION - ONSET: ONSET: GRADUAL

## 2020-11-01 ASSESSMENT — PAIN DESCRIPTION - LOCATION: LOCATION: BACK

## 2020-11-01 ASSESSMENT — PAIN - FUNCTIONAL ASSESSMENT: PAIN_FUNCTIONAL_ASSESSMENT: ACTIVITIES ARE NOT PREVENTED

## 2020-11-01 ASSESSMENT — PAIN DESCRIPTION - FREQUENCY: FREQUENCY: INTERMITTENT

## 2020-11-01 ASSESSMENT — PAIN DESCRIPTION - DESCRIPTORS: DESCRIPTORS: ACHING;DISCOMFORT

## 2020-11-01 NOTE — PLAN OF CARE
Problem: Airway Clearance - Ineffective  Goal: Achieve or maintain patent airway  Outcome: Ongoing     Problem: Gas Exchange - Impaired  Goal: Absence of hypoxia  Outcome: Ongoing  Goal: Promote optimal lung function  Outcome: Ongoing     Problem: Breathing Pattern - Ineffective  Goal: Ability to achieve and maintain a regular respiratory rate  Outcome: Ongoing     Problem:  Body Temperature -  Risk of, Imbalanced  Goal: Ability to maintain a body temperature within defined limits  Outcome: Ongoing  Goal: Will regain or maintain usual level of consciousness  Outcome: Ongoing  Goal: Complications related to the disease process, condition or treatment will be avoided or minimized  Outcome: Ongoing     Problem: Isolation Precautions - Risk of Spread of Infection  Goal: Prevent transmission of infection  Outcome: Ongoing     Problem: Nutrition Deficits  Goal: Optimize nutrtional status  Outcome: Ongoing     Problem: Risk for Fluid Volume Deficit  Goal: Maintain normal heart rhythm  Outcome: Ongoing  Goal: Maintain absence of muscle cramping  Outcome: Ongoing  Goal: Maintain normal serum potassium, sodium, calcium, phosphorus, and pH  Outcome: Ongoing     Problem: Loneliness or Risk for Loneliness  Goal: Demonstrate positive use of time alone when socialization is not possible  Outcome: Ongoing     Problem: Fatigue  Goal: Verbalize increase energy and improved vitality  Outcome: Ongoing     Problem: Patient Education: Go to Patient Education Activity  Goal: Patient/Family Education  Outcome: Ongoing     Problem: Falls - Risk of:  Goal: Will remain free from falls  Description: Will remain free from falls  Outcome: Ongoing  Goal: Absence of physical injury  Description: Absence of physical injury  Outcome: Ongoing     Problem: Skin Integrity:  Goal: Will show no infection signs and symptoms  Description: Will show no infection signs and symptoms  Outcome: Ongoing  Goal: Absence of new skin breakdown  Description: Absence of new skin breakdown  Outcome: Ongoing     Problem: Pain:  Goal: Pain level will decrease  Description: Pain level will decrease  Outcome: Ongoing  Goal: Control of acute pain  Description: Control of acute pain  Outcome: Ongoing  Goal: Control of chronic pain  Description: Control of chronic pain  Outcome: Ongoing

## 2020-11-01 NOTE — PROGRESS NOTES
Per Micro lab update Dr. Jean Floor on corrected blood culture results that had been reported incorrectly on last admit. Dr. Jeanne Riley spoke with Salo Maria. New orders for blood cultures to be drawn. Kenna Chavez

## 2020-11-01 NOTE — PLAN OF CARE
Problem: Airway Clearance - Ineffective  Goal: Achieve or maintain patent airway  11/1/2020 1028 by Anna Zayas RN  Outcome: Ongoing  11/1/2020 0604 by Li Ruiz RN  Outcome: Ongoing     Problem: Gas Exchange - Impaired  Goal: Absence of hypoxia  11/1/2020 1028 by Anna Zayas RN  Outcome: Ongoing  11/1/2020 0604 by Li Ruiz RN  Outcome: Ongoing  Goal: Promote optimal lung function  11/1/2020 1028 by Anna Zayas RN  Outcome: Ongoing  11/1/2020 0604 by Li Ruiz RN  Outcome: Ongoing     Problem: Breathing Pattern - Ineffective  Goal: Ability to achieve and maintain a regular respiratory rate  11/1/2020 1028 by Anna Zayas RN  Outcome: Ongoing  11/1/2020 0604 by Li Ruiz RN  Outcome: Ongoing     Problem:  Body Temperature -  Risk of, Imbalanced  Goal: Ability to maintain a body temperature within defined limits  11/1/2020 1028 by Anna Zayas RN  Outcome: Ongoing  11/1/2020 0604 by Li Ruiz RN  Outcome: Ongoing  Goal: Will regain or maintain usual level of consciousness  11/1/2020 1028 by Anna Zayas RN  Outcome: Ongoing  11/1/2020 0604 by Li Ruiz RN  Outcome: Ongoing  Goal: Complications related to the disease process, condition or treatment will be avoided or minimized  11/1/2020 1028 by Anna Zayas RN  Outcome: Ongoing  11/1/2020 0604 by Li Ruiz RN  Outcome: Ongoing     Problem: Isolation Precautions - Risk of Spread of Infection  Goal: Prevent transmission of infection  11/1/2020 1028 by Anna Zayas RN  Outcome: Ongoing  11/1/2020 0604 by Li Ruiz RN  Outcome: Ongoing     Problem: Nutrition Deficits  Goal: Optimize nutrtional status  11/1/2020 1028 by Anna Zayas RN  Outcome: Ongoing  11/1/2020 0604 by Li Ruiz RN  Outcome: Ongoing     Problem: Risk for Fluid Volume Deficit  Goal: Maintain normal heart rhythm  11/1/2020 1028 by Sultana Vasquez GOLDEN Renee  Outcome: Ongoing  11/1/2020 0604 by Mikayla Santoro RN  Outcome: Ongoing  Goal: Maintain absence of muscle cramping  11/1/2020 1028 by Peggy Valentin RN  Outcome: Ongoing  11/1/2020 0604 by Mikayla Santoro RN  Outcome: Ongoing  Goal: Maintain normal serum potassium, sodium, calcium, phosphorus, and pH  11/1/2020 1028 by Peggy Valentin RN  Outcome: Ongoing  11/1/2020 0604 by Mikayla Santoro RN  Outcome: Ongoing     Problem: Loneliness or Risk for Loneliness  Goal: Demonstrate positive use of time alone when socialization is not possible  11/1/2020 1028 by Peggy Valentin RN  Outcome: Ongoing  11/1/2020 0604 by Mikayla Santoro RN  Outcome: Ongoing     Problem: Fatigue  Goal: Verbalize increase energy and improved vitality  11/1/2020 1028 by Peggy Valentin RN  Outcome: Ongoing  11/1/2020 0604 by Mikayla Santoro RN  Outcome: Ongoing     Problem: Patient Education: Go to Patient Education Activity  Goal: Patient/Family Education  11/1/2020 1028 by Peggy Valentin RN  Outcome: Ongoing  11/1/2020 0604 by Mikayla Santoro RN  Outcome: Ongoing     Problem: Falls - Risk of:  Goal: Will remain free from falls  Description: Will remain free from falls  11/1/2020 1028 by Peggy Valentin RN  Outcome: Ongoing  11/1/2020 0604 by Mikayla Santoro RN  Outcome: Ongoing  Goal: Absence of physical injury  Description: Absence of physical injury  11/1/2020 1028 by Peggy Valentin RN  Outcome: Ongoing  11/1/2020 0604 by Mikayla Santoro RN  Outcome: Ongoing     Problem: Skin Integrity:  Goal: Will show no infection signs and symptoms  Description: Will show no infection signs and symptoms  11/1/2020 1028 by Peggy Valentin RN  Outcome: Ongoing  11/1/2020 0604 by Mikayla Santoro RN  Outcome: Ongoing  Goal: Absence of new skin breakdown  Description: Absence of new skin breakdown  11/1/2020 1028 by Peggy Valentin RN  Outcome: Ongoing  11/1/2020

## 2020-11-01 NOTE — PROGRESS NOTES
Progress Note    Admit Date:  10/29/2020    Admitted for placement needs with recent COVID + infection. Subjective:  Ms. Beba Dyer denies any concerns. No SOB or chest pain. Denies any diarrhea. Objective:   Patient Vitals for the past 4 hrs:   BP Temp Temp src Pulse Resp SpO2   11/01/20 0911 -- -- -- -- -- 92 %   11/01/20 0757 (!) 152/75 97.6 °F (36.4 °C) Oral 80 18 95 %            Intake/Output Summary (Last 24 hours) at 11/1/2020 1018  Last data filed at 11/1/2020 0001  Gross per 24 hour   Intake 10 ml   Output --   Net 10 ml       Physical Exam:  Gen: No distress. Alert. Appears fatigued. Neck: Trachea midline. Resp: No accessory muscle use, CTA bilaterally. No crackles. No wheezes. No rhonchi. On RA  CV: Regular rate. Regular rhythm. No murmur. No rub. No edema. GI: Soft, Non-tender. Non-distended. Normal bowel sounds. Skin: Warm and dry. No rash on exposed extremities. Neuro: Awake. chronic left UE and LE weakness  Psych: Oriented x 3. No anxiety or agitation.        Scheduled Meds:   amLODIPine  5 mg Oral Daily    vitamin B complex w/C  1 tablet Oral Daily    insulin lispro protamine & lispro  44 Units Subcutaneous BID WC    levothyroxine  112 mcg Oral Daily    lisinopril  40 mg Oral Daily    rosuvastatin  10 mg Oral Daily    vitamin B-12  1,000 mcg Oral Daily    sodium chloride flush  10 mL Intravenous 2 times per day    insulin lispro  0-12 Units Subcutaneous TID WC    insulin lispro  0-6 Units Subcutaneous Nightly    magnesium oxide  400 mg Oral Daily    warfarin (COUMADIN) daily dosing (placeholder)   Other RX Placeholder    miconazole   Topical BID       Continuous Infusions:   dextrose         PRN Meds:  sodium chloride flush, acetaminophen **OR** acetaminophen, polyethylene glycol, promethazine **OR** ondansetron, glucose, dextrose, glucagon (rDNA), dextrose      Data:  CBC:   Recent Labs     10/29/20  1152 10/30/20  0606   WBC 7.0 7.3   HGB 13.4 13.1   HCT 39.5 39.3   MCV 87.4 89.4    201     BMP:   Recent Labs     10/29/20  1152 10/30/20  0606    135*   K 3.6 3.4*    101   CO2 25 25   BUN 12 10   CREATININE 0.7 0.6     LIVER PROFILE:   Recent Labs     10/29/20  1152   AST 21   ALT 19   BILITOT 0.5   ALKPHOS 74     PT/INR:   Recent Labs     10/30/20  0606 10/31/20  0607 11/01/20  0705   PROTIME 24.0* 21.0* 19.4*   INR 2.05* 1.80* 1.66*       CULTURES  None      RADIOLOGY  XR CHEST PORTABLE   Final Result   Moderate pulmonary vascular congestion similar to the prior study. No   pleural effusion. Assessment/Plan:    COVID 19 Infection  - recently discharged on 10/27 on RA & received Decadron, Remdesivir x 3 days, 2 u CCP   - she is stable on RA. Will monitor on continuous pulse ox. - CXR with moderate pulmonary vascular congestion  - Admitted to Med Surg, droplet + precautions,cont pulse ox and tele  - satting well on RA     Weakness  - PT/OT  - needs placement  - unable to care for herself at home--> is currently admitted as well with COVID and hypoxia       Diarrhea - Resolved  - she denies diarrhea at this time  - most likely due to above. Will monitor.      Hypocalcemia  - 7.8,  2/2 low albumin  - corrected to 8.4, no intervention necesssary     Type II DM  - uncontrolled  - cont Humalog mix - 44 units BID, SSI  - POC Glucose, carb control diet     HTN  - controlled  - cont Norvasc, Lisinopril  - monitor     Paroxysmal  Atrial Fibrillation  - NRS on admission  - AC on Coumadin, pharm to dose (2.21)     Hypothyroidism  - home dose of synthroid 112 mcg       HLD  - cont Crestor     Hx of CVA  - Left sided weakness  - cont statin    DVT Prophylaxis: Coumadin   Diet: DIET CARB CONTROL; Carb Control: 4 carb choices (60 gms)/meal  Code Status: Full Code      Ot/pt. SNF was recommended. Patient refusing SNF.       Home with 24/7 care     Zach Garcia PA-C 10:20 AM 11/1/2020     Addendum 11/1/2020 11:58  Call from [employer] reporting pt w/ positive blood

## 2020-11-01 NOTE — PROGRESS NOTES
11/01/20 0757   Vital Signs   Temp 97.6 °F (36.4 °C)   Temp Source Oral   Pulse 80   Heart Rate Source Monitor   Resp 18   BP (!) 152/75   BP Location Right upper arm   BP Upper/Lower Upper   Patient Position Semi fowlers   Level of Consciousness 0   MEWS Score 1   Patient Currently in Pain Denies   Oxygen Therapy   SpO2 95 %   O2 Device Nasal cannula   O2 Flow Rate (L/min) 2 L/min     Patient's vitals as above this morning. I removed patient's oxygen at this time and patient's oxygen level remained above 90%. Patient with no signs or symptoms of distress. Patient denies needs at this time. Bed alarm on, call light within reach, belongings within reach of patient on her right side.

## 2020-11-01 NOTE — PROGRESS NOTES
Patient desatted to 87% on room air, patient was placed back on 2 L of oxygen via nasal cannula at this time and patient's oxygen saturation came back up to 93%.

## 2020-11-02 VITALS
BODY MASS INDEX: 27.49 KG/M2 | TEMPERATURE: 96.6 F | OXYGEN SATURATION: 91 % | SYSTOLIC BLOOD PRESSURE: 150 MMHG | HEART RATE: 76 BPM | HEIGHT: 65 IN | DIASTOLIC BLOOD PRESSURE: 76 MMHG | WEIGHT: 165 LBS | RESPIRATION RATE: 16 BRPM

## 2020-11-02 LAB
ANION GAP SERPL CALCULATED.3IONS-SCNC: 8 MMOL/L (ref 3–16)
BASOPHILS ABSOLUTE: 0.1 K/UL (ref 0–0.2)
BASOPHILS RELATIVE PERCENT: 1.1 %
BUN BLDV-MCNC: 14 MG/DL (ref 7–20)
CALCIUM SERPL-MCNC: 8.6 MG/DL (ref 8.3–10.6)
CHLORIDE BLD-SCNC: 98 MMOL/L (ref 99–110)
CO2: 26 MMOL/L (ref 21–32)
CREAT SERPL-MCNC: 0.6 MG/DL (ref 0.6–1.2)
EOSINOPHILS ABSOLUTE: 0.2 K/UL (ref 0–0.6)
EOSINOPHILS RELATIVE PERCENT: 3.7 %
GFR AFRICAN AMERICAN: >60
GFR NON-AFRICAN AMERICAN: >60
GLUCOSE BLD-MCNC: 213 MG/DL (ref 70–99)
GLUCOSE BLD-MCNC: 257 MG/DL (ref 70–99)
GLUCOSE BLD-MCNC: 265 MG/DL (ref 70–99)
GLUCOSE BLD-MCNC: 265 MG/DL (ref 70–99)
HCT VFR BLD CALC: 40.3 % (ref 36–48)
HEMOGLOBIN: 13.2 G/DL (ref 12–16)
INR BLD: 2.24 (ref 0.86–1.14)
LYMPHOCYTES ABSOLUTE: 1.1 K/UL (ref 1–5.1)
LYMPHOCYTES RELATIVE PERCENT: 15.8 %
MCH RBC QN AUTO: 29.3 PG (ref 26–34)
MCHC RBC AUTO-ENTMCNC: 32.8 G/DL (ref 31–36)
MCV RBC AUTO: 89.3 FL (ref 80–100)
MONOCYTES ABSOLUTE: 0.8 K/UL (ref 0–1.3)
MONOCYTES RELATIVE PERCENT: 12.5 %
NEUTROPHILS ABSOLUTE: 4.5 K/UL (ref 1.7–7.7)
NEUTROPHILS RELATIVE PERCENT: 66.9 %
PDW BLD-RTO: 13.8 % (ref 12.4–15.4)
PERFORMED ON: ABNORMAL
PLATELET # BLD: 249 K/UL (ref 135–450)
PMV BLD AUTO: 8.6 FL (ref 5–10.5)
POTASSIUM SERPL-SCNC: 3.7 MMOL/L (ref 3.5–5.1)
PROTHROMBIN TIME: 26.2 SEC (ref 10–13.2)
RBC # BLD: 4.51 M/UL (ref 4–5.2)
SODIUM BLD-SCNC: 132 MMOL/L (ref 136–145)
WBC # BLD: 6.7 K/UL (ref 4–11)

## 2020-11-02 PROCEDURE — 2700000000 HC OXYGEN THERAPY PER DAY

## 2020-11-02 PROCEDURE — 80048 BASIC METABOLIC PNL TOTAL CA: CPT

## 2020-11-02 PROCEDURE — 85610 PROTHROMBIN TIME: CPT

## 2020-11-02 PROCEDURE — G0378 HOSPITAL OBSERVATION PER HR: HCPCS

## 2020-11-02 PROCEDURE — 85025 COMPLETE CBC W/AUTO DIFF WBC: CPT

## 2020-11-02 PROCEDURE — 2580000003 HC RX 258: Performed by: NURSE PRACTITIONER

## 2020-11-02 PROCEDURE — 97116 GAIT TRAINING THERAPY: CPT

## 2020-11-02 PROCEDURE — 99217 PR OBSERVATION CARE DISCHARGE MANAGEMENT: CPT | Performed by: INTERNAL MEDICINE

## 2020-11-02 PROCEDURE — 36415 COLL VENOUS BLD VENIPUNCTURE: CPT

## 2020-11-02 PROCEDURE — 97530 THERAPEUTIC ACTIVITIES: CPT

## 2020-11-02 PROCEDURE — 6370000000 HC RX 637 (ALT 250 FOR IP): Performed by: NURSE PRACTITIONER

## 2020-11-02 PROCEDURE — 94761 N-INVAS EAR/PLS OXIMETRY MLT: CPT

## 2020-11-02 RX ORDER — WARFARIN SODIUM 2.5 MG/1
2.5 TABLET ORAL
Status: DISCONTINUED | OUTPATIENT
Start: 2020-11-02 | End: 2020-11-02 | Stop reason: HOSPADM

## 2020-11-02 RX ADMIN — INSULIN LISPRO 44 UNITS: 100 INJECTION, SUSPENSION SUBCUTANEOUS at 09:20

## 2020-11-02 RX ADMIN — LISINOPRIL 40 MG: 20 TABLET ORAL at 09:20

## 2020-11-02 RX ADMIN — MICONAZOLE NITRATE: 20 POWDER TOPICAL at 00:00

## 2020-11-02 RX ADMIN — Medication 10 ML: at 09:21

## 2020-11-02 RX ADMIN — CYANOCOBALAMIN TAB 500 MCG 1000 MCG: 500 TAB at 09:21

## 2020-11-02 RX ADMIN — Medication 10 ML: at 00:00

## 2020-11-02 RX ADMIN — MICONAZOLE NITRATE: 20 POWDER TOPICAL at 09:28

## 2020-11-02 RX ADMIN — INSULIN LISPRO 4 UNITS: 100 INJECTION, SOLUTION INTRAVENOUS; SUBCUTANEOUS at 00:00

## 2020-11-02 RX ADMIN — B-COMPLEX W/ C & FOLIC ACID TAB 1 TABLET: TAB at 09:21

## 2020-11-02 RX ADMIN — AMLODIPINE BESYLATE 5 MG: 5 TABLET ORAL at 09:21

## 2020-11-02 RX ADMIN — LEVOTHYROXINE SODIUM 112 MCG: 112 TABLET ORAL at 03:50

## 2020-11-02 RX ADMIN — ROSUVASTATIN CALCIUM 10 MG: 10 TABLET, FILM COATED ORAL at 09:21

## 2020-11-02 RX ADMIN — MAGNESIUM GLUCONATE 500 MG ORAL TABLET 400 MG: 500 TABLET ORAL at 09:20

## 2020-11-02 NOTE — PROGRESS NOTES
Inpatient Physical Therapy Daily Treatment Note    Unit: [facility]  Date:  11/2/2020  Patient Name:    Brad Rodríguez  Admitting diagnosis:  Weakness [R53.1]  Admit Date:  10/29/2020  Precautions/Restrictions:  Fall risk, Bed/chair alarm, Telemetry, Continuous pulse oximetry, Isolation Precautions: Droplet Plus - COVID and hx CVA (L sided weakness), telesitter      Discharge Recommendations: home with 24/7 supervision/SBA - if spouse is unable to provide recommend pt go to SNF as she does not have other assistance  DME needs for discharge: Needs Met       Therapy recommendation for EMS Transport: can transport by wheelchair    Therapy recommendations for staff:   Assist of 1 with use of rolling walker (RW) and gait belt for all transfers and ambulation to/from bathroom    History of Present Illness:  Per H&P: 80 y.o. female with a PMH of PAF (AC on Coumadin), Hx of CVA, DM Type II, HTN, Hypothyroidism who presented to [facility] with complaint of SOB after recently being discharged from the hospital (see below). She reports feeling tired and weak.  She has some nausea and poor appetite.  She has a cough and feels short of breath.  Denies chest pain, fever, vomiting, or diarrhea. Of note: Pt was recently discharged from [facility] after being admitted from 10/21-10/27 for acute respiratory failure 2/2 COVID 19 infection. Pt had persistent high fevers and required 2L NC. She was treated with Decadron, 3 days of Remdesivir and 2 units of CCP. Pt was weaned to RA at discharge. Work up in the ED showed: Pt was afebrile with stable BP and HR. Pt on RA. BG was elevated at 242. No leukocytosis. INR was 2.21 - pt is AC on Coumadin. CXR showed moderate pulmonary vascular congestion similar to prior study. EKG showed NSR with sinus arrhythmia. Pt was admitted to Med Surg for further management.     Home Health S4 Level Recommendation: Level 1 Standard  AM-PAC Mobility Score   AM-PAC Inpatient Mobility Raw Score : 17       Treatment Time: stairs at this time    Therapeutic Exercise Nereyda deferred secondary to treatment focus on functional mobility  NA    Balance  Sitting:  Good ; Supervision  Comments: for static and dynamic tasks    Standing: Good - ; SBA  Comments: 1 + 1 minute for donning/doffing pants and pericare without UE support, pt maintains wide DOLORES when standing without UE support    Patient Education      Role of PT, POC, Discharge recommendations, DC recommendations, safety awareness, transfer techniques and calling for assist with mobility. Positioning Needs       Pt reclined in chair, alarm set, positioned in proper neutral alignment and pressure relief provided. Call light provided and all needs within reach   Pt able to operate recliner independently    Activity Tolerance   Pt completed therapy session with No adverse symptoms noted w/activity  SpO2: 94% on RA at rest   91-92% on RA after ambulation  HR: 79 bpm at rest   84 bpm after ambulation    Other  Pt voided bowel and bladder on commode, independent for seated pericare. Assessment :  Patient tolerated treatment session well. Pt had improved step height with use of L ACE wrap for foot drop but also had increased circumduction which caused her to hit the walker intermittent. Pt reported feeling more stable - would benefit from trial using over the counter AFO with therapy supervision. Recommending Home 24 hr supervision and with home PT upon discharge as patient functioning close to baseline level and would benefit from continued therapy services to trial AFO. If pt's spouse is unable to provide 24/7 supervision/SBA - recommend SNF. Goals (all goals ongoing unless otherwise indicated)  To be met in 3 visits:  1). Independent with LE Ex x 10 reps  2). Trial ambulation with L foot dorsiflexion ACE wrap x5 ft with Min A and use of RW     To be met in 6 visits:  1). Supine to/from sit: Modified Independent  2). Sit to/from stand: Modified Independent  3).   Bed to

## 2020-11-02 NOTE — DISCHARGE INSTR - COC
E03.9    Painful orthopaedic hardware ([facility] T84.84XA    Left ankle pain M25.572    Left foot pain M79.672    Ischemic stroke (HCC) I63.9    Chronic ischemic heart disease I25.9    Essential hypertension I10    Hyperlipidemia E78.5    Transient cerebral ischemia G45.9    Encounter for loop recorder check Z45.09    Closed fracture of proximal end of left humerus S42.202A    CVA, old, hemiparesis ([facility] O37.958    Cerebrovascular accident (CVA) ([facility] I63.9    Vitamin D deficiency E55.9    Paroxysmal A-fib ([facility]) I48.0    Coronary artery disease involving native coronary artery of native heart without angina pectoris I25.10    RBBB I45.10    Precordial pain R07.2    Pneumonia due to organism J18.9    COVID-19 U07.1    Acute respiratory failure with hypoxia ([facility]) J96.01    Pneumonia due to COVID-19 virus U07.1, J12.89    Coagulopathy ([facility]) D68.9    Positive blood culture R78.81    Weakness R53.1    Shortness of breath R06.02    Diarrhea R19.7       Isolation/Infection:   Isolation          Droplet Plus        Patient Infection Status     Infection Onset Added Last Indicated Last Indicated By Review Planned Expiration Resolved Resolved By    COVID-19  10/24/20 10/24/20 Gissell Watkins MD 10/31/20 11/07/20      Resolved    COVID-19 Rule Out 10/22/20 10/22/20 10/22/20 COVID-19 (Ordered)   10/25/20 Tammi Castro RN    PENDING TEST FROM 10/16/20 PER PATIENT          Nurse Assessment:  Last Vital Signs: BP (!) 153/65   Pulse 73   Temp 97.8 °F (36.6 °C) (Oral)   Resp 20   Ht 5' 5\" (1.651 m)   Wt 165 lb (74.8 kg)   SpO2 91%   BMI 27.46 kg/m²     Last documented pain score (0-10 scale): Pain Level: 2  Last Weight:   Wt Readings from Last 1 Encounters:   10/29/20 165 lb (74.8 kg)     Mental Status:  oriented and alert    IV Access:  - None    Nursing Mobility/ADLs:  Walking   Assisted  Transfer  Assisted  Bathing  Assisted  Dressing  Assisted  Toileting  Assisted  Feeding  Assisted  Med Admin Independent  Med Delivery   whole    Wound Care Documentation and Therapy:        Elimination:  Continence:   · Bowel: Yes  · Bladder: Yes  Urinary Catheter: None   Colostomy/Ileostomy/Ileal Conduit: No       Date of Last BM: ***    Intake/Output Summary (Last 24 hours) at 11/2/2020 1252  Last data filed at 11/2/2020 0914  Gross per 24 hour   Intake 130 ml   Output --   Net 130 ml     I/O last 3 completed shifts: In: 10 [I.V.:10]  Out: -     Safety Concerns:     None    Impairments/Disabilities:      None    Nutrition Therapy:  Current Nutrition Therapy:   - Oral Diet:  General    Routes of Feeding: Oral  Liquids: No Restrictions  Daily Fluid Restriction: no  Last Modified Barium Swallow with Video (Video Swallowing Test): not done    Treatments at the Time of Hospital Discharge:   Respiratory Treatments: ***  Oxygen Therapy:  is not on home oxygen therapy. Ventilator:    - No ventilator support    Rehab Therapies: {THERAPEUTIC INTERVENTION:[id number]}  Weight Bearing Status/Restrictions: No weight bearing restirctions  Other Medical Equipment (for information only, NOT a DME order):  ***  Other Treatments: ***    Patient's personal belongings (please select all that are sent with patient):  Glasses, Dentures upper    RN SIGNATURE:  Electronically signed by Lillian Ware RN on 11/2/20 at 2:38 PM EST    CASE MANAGEMENT/SOCIAL WORK SECTION    Inpatient Status Date: ***    Readmission Risk Assessment Score:  Readmission Risk              Risk of Unplanned Readmission:        0           Discharging to Facility/ Agency   · Name:  [facility]  · Phone: [phone]    .     / signature: Electronically signed by Chris Maria RN on 11/2/20 at 1:32 PM EST    PHYSICIAN SECTION    Prognosis: Good    Condition at Discharge: Stable    Rehab Potential (if transferring to Rehab): {Prognosis:[id number]}    Recommended Labs or Other Treatments After Discharge: ***    Physician Certification: I certify the above information and transfer of Mollie Runner  is necessary for the continuing treatment of the diagnosis listed and that she requires [address] for less 30 days.      Update Admission H&P: No change in H&P    PHYSICIAN SIGNATURE:  Electronically signed by v.O. Dr. Michalene Gosselin, RN on 11/2/20 at 1:33 PM EST

## 2020-11-02 NOTE — PROGRESS NOTES
Report given and care transferred to [facility], RN. Patient in stable condition at time of care transfer. No signs or symptoms of distress noted. Patient denies needs.

## 2020-11-02 NOTE — DISCHARGE INSTR - DIET
 Good nutrition is important when healing from an illness, injury, or surgery. Follow any nutrition recommendations given to you during your hospital stay.  If you were given an oral nutrition supplement while in the hospital, continue to take this supplement at home. You can take it with meals, in-between meals, and/or before bedtime. These supplements can be purchased at most local grocery stores, pharmacies, and chain super-stores.  If you have any questions about your diet or nutrition, call the hospital and ask for the dietitian. Counting Carbohydrates: Care Instructions  Your Care Instructions     You don't have to eat special foods when you have diabetes. You just have to be careful to eat healthy foods. Carbohydrates (carbs) raise blood sugar higher and quicker than any other nutrient. Carbs are found in desserts, breads and cereals, and fruit. They're also in starchy vegetables. These include potatoes, corn, and grains such as rice and pasta. Carbs are also in milk and yogurt. The more carbs you eat at one time, the higher your blood sugar will rise. Spreading carbs all through the day helps keep your blood sugar levels within your target range. Counting carbs is one of the best ways to keep your blood sugar under control. If you use insulin, counting carbs helps you match the right amount of insulin to the number of grams of carbs in a meal. Then you can change your diet and insulin dose as needed. Testing your blood sugar several times a day can help you learn how carbs affect your blood sugar. A registered dietitian or certified diabetes educator can help you plan meals and snacks. Follow-up care is a key part of your treatment and safety. Be sure to make and go to all appointments, and call your doctor if you are having problems. It's also a good idea to know your test results and keep a list of the medicines you take. How can you care for yourself at home?   Know your daily amount listed for one serving. · Protein, fat, and fiber do not raise blood sugar as much as carbs do. If you eat a lot of these nutrients in a meal, your blood sugar will rise more slowly than it would otherwise. Eat from all food groups  · Eat at least three meals a day. · Plan meals to include food from all the food groups. The food groups include grains, fruits, dairy, proteins, and vegetables. · Talk to your dietitian or diabetes educator about ways to add limited amounts of sweets into your meal plan. · If you drink alcohol, talk to your doctor. It may not be recommended when you are taking certain diabetes medicines. Where can you learn more? Go to https://Playbasispe[employer]ang[employer] CK1.SuperSport. org and sign in to your SupportPay account. Enter J634 in the Roundscapes box to learn more about \"Counting Carbohydrates: Care Instructions. \"     If you do not have an account, please click on the \"Sign Up Now\" link. Current as of: December 20, 2019               Content Version: 12.6  © 4331-8505 [employer]. Care instructions adapted under license by [facility] ([facility]). If you have questions about a medical condition or this instruction, always ask your healthcare professional. [address] any warranty or liability for your use of this information. Learning About Diabetes Food Guidelines  Your Care Instructions     Meal planning is important to manage diabetes. It helps keep your blood sugar at a target level (which you set with your doctor). You don't have to eat special foods. You can eat what your family eats, including sweets once in a while. But you do have to pay attention to how often you eat and how much you eat of certain foods. You may want to work with a dietitian or a certified diabetes educator (CDE) to help you plan meals and snacks. A dietitian or CDE can also help you lose weight if that is one of your goals.   What should you know about eating carbs? Managing the amount of carbohydrate (carbs) you eat is an important part of healthy meals when you have diabetes. Carbohydrate is found in many foods. · Learn which foods have carbs. And learn the amounts of carbs in different foods. ? Bread, cereal, pasta, and rice have about 15 grams of carbs in a serving. A serving is 1 slice of bread (1 ounce), ½ cup of cooked cereal, or 1/3 cup of cooked pasta or rice. ? Fruits have 15 grams of carbs in a serving. A serving is 1 small fresh fruit, such as an apple or orange; ½ of a banana; ½ cup of cooked or canned fruit; ½ cup of fruit juice; 1 cup of melon or raspberries; or 2 tablespoons of dried fruit. ? Milk and no-sugar-added yogurt have 15 grams of carbs in a serving. A serving is 1 cup of milk or 2/3 cup of no-sugar-added yogurt. ? Starchy vegetables have 15 grams of carbs in a serving. A serving is ½ cup of mashed potatoes or sweet potato; 1 cup winter squash; ½ of a small baked potato; ½ cup of cooked beans; or ½ cup cooked corn or green peas. · Learn how much carbs to eat each day and at each meal. A dietitian or CDE can teach you how to keep track of the amount of carbs you eat. This is called carbohydrate counting. · If you are not sure how to count carbohydrate grams, use the Plate Method to plan meals. It is a good, quick way to make sure that you have a balanced meal. It also helps you spread carbs throughout the day. ? Divide your plate by types of foods. Put non-starchy vegetables on half the plate, meat or other protein food on one-quarter of the plate, and a grain or starchy vegetable in the final quarter of the plate. To this you can add a small piece of fruit and 1 cup of milk or yogurt, depending on how many carbs you are supposed to eat at a meal.  · Try to eat about the same amount of carbs at each meal. Do not \"save up\" your daily allowance of carbs to eat at one meal.  · Proteins have very little or no carbs per serving.  Examples of proteins are beef, chicken, turkey, fish, eggs, tofu, cheese, cottage cheese, and peanut butter. A serving size of meat is 3 ounces, which is about the size of a deck of cards. Examples of meat substitute serving sizes (equal to 1 ounce of meat) are 1/4 cup of cottage cheese, 1 egg, 1 tablespoon of peanut butter, and ½ cup of tofu. How can you eat out and still eat healthy? · Learn to estimate the serving sizes of foods that have carbohydrate. If you measure food at home, it will be easier to estimate the amount in a serving of restaurant food. · If the meal you order has too much carbohydrate (such as potatoes, corn, or baked beans), ask to have a low-carbohydrate food instead. Ask for a salad or green vegetables. · If you use insulin, check your blood sugar before and after eating out to help you plan how much to eat in the future. · If you eat more carbohydrate at a meal than you had planned, take a walk or do other exercise. This will help lower your blood sugar. What else should you know? · Limit saturated fat, such as the fat from meat and dairy products. This is a healthy choice because people who have diabetes are at higher risk of heart disease. So choose lean cuts of meat and nonfat or low-fat dairy products. Use olive or canola oil instead of butter or shortening when cooking. · Don't skip meals. Your blood sugar may drop too low if you skip meals and take insulin or certain medicines for diabetes. · Check with your doctor before you drink alcohol. Alcohol can cause your blood sugar to drop too low. Alcohol can also cause a bad reaction if you take certain diabetes medicines. Follow-up care is a key part of your treatment and safety. Be sure to make and go to all appointments, and call your doctor if you are having problems. It's also a good idea to know your test results and keep a list of the medicines you take. Where can you learn more? Go to https://terri.health-partners. org and sign in to your Shanpow.com account. Enter R194 in the Kyles500Friends box to learn more about \"Learning About Diabetes Food Guidelines. \"     If you do not have an account, please click on the \"Sign Up Now\" link. Current as of: December 20, 2019               Content Version: 12.6  © 7455-7856 [employer]. Care instructions adapted under license by [facility] ([facility]). If you have questions about a medical condition or this instruction, always ask your healthcare professional. [address] any warranty or liability for your use of this information.

## 2020-11-02 NOTE — PROGRESS NOTES
Pt discharge instructions given. Pt delivered to husbands vehicle out front and assisted into car. Information given to her and her  on home quarantine. Denies needs.

## 2020-11-02 NOTE — PLAN OF CARE
Problem: Airway Clearance - Ineffective  Goal: Achieve or maintain patent airway  11/2/2020 0242 by Stella Donato RN  Outcome: Ongoing     Problem: Gas Exchange - Impaired  Goal: Absence of hypoxia  11/2/2020 0242 by Stella Donato RN  Outcome: Ongoing  Goal: Promote optimal lung function  11/2/2020 1104 by Tiki Solomon RN  Outcome: Ongoing  11/2/2020 0242 by Stella Donato RN  Outcome: Ongoing     Problem: Breathing Pattern - Ineffective  Goal: Ability to achieve and maintain a regular respiratory rate  11/2/2020 1104 by Tiki Solomon RN  Outcome: Ongoing  11/2/2020 0242 by Stella Donato RN  Outcome: Ongoing     Problem:  Body Temperature -  Risk of, Imbalanced  Goal: Ability to maintain a body temperature within defined limits  11/2/2020 0242 by Stella Donato RN  Outcome: Ongoing  Goal: Will regain or maintain usual level of consciousness  11/2/2020 0242 by Stella Donato RN  Outcome: Ongoing  Goal: Complications related to the disease process, condition or treatment will be avoided or minimized  11/2/2020 0242 by Stella Donato RN  Outcome: Ongoing     Problem: Isolation Precautions - Risk of Spread of Infection  Goal: Prevent transmission of infection  11/2/2020 0242 by Stella Donato RN  Outcome: Ongoing     Problem: Nutrition Deficits  Goal: Optimize nutrtional status  11/2/2020 0242 by Stella Donato RN  Outcome: Ongoing     Problem: Risk for Fluid Volume Deficit  Goal: Maintain normal heart rhythm  11/2/2020 1104 by Tiki Solomon RN  Outcome: Ongoing  11/2/2020 0242 by Stella Donato RN  Outcome: Ongoing  Goal: Maintain absence of muscle cramping  11/2/2020 0242 by Stella Donato RN  Outcome: Ongoing  Goal: Maintain normal serum potassium, sodium, calcium, phosphorus, and pH  11/2/2020 0242 by Stella Donato RN  Outcome: Ongoing     Problem: Loneliness or Risk for Loneliness  Goal: Demonstrate positive use of time alone when socialization is not possible  11/2/2020 0242 by River Dia RN  Outcome: Ongoing     Problem: Fatigue  Goal: Verbalize increase energy and improved vitality  11/2/2020 0242 by River Dia RN  Outcome: Ongoing     Problem: Patient Education: Go to Patient Education Activity  Goal: Patient/Family Education  11/2/2020 0242 by River Dia RN  Outcome: Ongoing     Problem: Falls - Risk of:  Goal: Will remain free from falls  Description: Will remain free from falls  11/2/2020 0242 by River Dia RN  Outcome: Ongoing  Goal: Absence of physical injury  Description: Absence of physical injury  11/2/2020 0242 by River Dia RN  Outcome: Ongoing     Problem: Skin Integrity:  Goal: Will show no infection signs and symptoms  Description: Will show no infection signs and symptoms  11/2/2020 0242 by River Dia RN  Outcome: Ongoing  Goal: Absence of new skin breakdown  Description: Absence of new skin breakdown  11/2/2020 0242 by River Dia RN  Outcome: Ongoing     Problem: Pain:  Goal: Pain level will decrease  Description: Pain level will decrease  11/2/2020 0242 by River Dia RN  Outcome: Ongoing  Goal: Control of acute pain  Description: Control of acute pain  11/2/2020 0242 by River Dia RN  Outcome: Ongoing  Goal: Control of chronic pain  Description: Control of chronic pain  11/2/2020 0242 by River Dia RN  Outcome: Ongoing

## 2020-11-02 NOTE — DISCHARGE SUMMARY
Name:  Re Guzman  Room:  1929/1495-05  MRN:    [id number]    Discharge Summary      This discharge summary is in conjunction with a complete physical exam done on the day of discharge. Discharging Physician: Judith Dunlap MD     Admit: 10/29/2020  Discharge:  11/2/2020    HPI taken from admission H&P:      The patient is a 79 y.o. female with a PMH of PAF (AC on Coumadin), Hx of CVA, DM Type II, HTN, Hypothyroidism who presented to [facility] ED with complaint of SOB after recently being discharged from the hospital (see below). She reports feeling tired and weak. She has some nausea and poor appetite. She has a cough and feels short of breath. Denies chest pain, fever, vomiting, or diarrhea.     Of note: Pt was recently discharged from [facility] after being admitted from 10/21-10/27 for acute respiratory failure 2/2 COVID 19 infection. Pt had persistent high fevers and required 2L NC. She was treated with Decadron, 3 days of Remdesivir and 2 units of CCP. Pt was weaned to RA at discharge.     Work up in the ED showed: Pt was afebrile with stable BP and HR. Pt on RA. BG was elevated at 242. No leukocytosis. INR was 2.21 - pt is AC on Coumadin. CXR showed moderate pulmonary vascular congestion similar to prior study. EKG showed NSR with sinus arrhythmia. Pt was admitted to Med Surg for further management. Diagnoses this Admission and [facility] 1485 19 Infection  - recently discharged on 10/27 on RA & received Decadron, Remdesivir x 3 days, 2 u CCP   - she is stable on RA. Will monitor on continuous pulse ox.   - CXR with moderate pulmonary vascular congestion  - Admitted to Med Surg, droplet + precautions,cont pulse ox and tele  - satting well on RA     Weakness  - PT/OT  - needs placement  - unable to care for herself at home--> is currently admitted as well with COVID and hypoxia   - discharge home. Recommendation was for her to go to a skilled nursing facility.   Has been refused and said he would take care of his wife at home. This is not my medical recommendation.     Diarrhea - Resolved  - she denies diarrhea at this time  - most likely due to above. Will monitor.      Hypocalcemia - Resolved  - 7.8,  2/2 low albumin  - corrected to 8.4, no intervention necesssary     Type II DM  - uncontrolled  - cont Humalog mix - 44 units BID, SSI  - POC Glucose, carb control diet     HTN  - controlled  - cont Norvasc, Lisinopril  - monitored     Paroxysmal  Atrial Fibrillation  - NRS on admission  - AC on Coumadin, pharm to dose (2.21)     Hypothyroidism  - home dose of synthroid 112 mcg       HLD  - cont Crestor     Hx of CVA  - Left sided weakness  - cont statin    Procedures (Please Review Full Report for Details)  None    Consults    None    Physical Exam at Discharge:    BP (!) 153/65   Pulse 73   Temp 97.8 °F (36.6 °C) (Oral)   Resp 20   Ht 5' 5\" (1.651 m)   Wt 165 lb (74.8 kg)   SpO2 91%   BMI 27.46 kg/m²     Gen: No distress. Alert. Appears fatigued. Neck: Trachea midline. Resp: No accessory muscle use, CTA bilaterally. No crackles. No wheezes. No rhonchi. On RA  CV: Regular rate. Regular rhythm. No murmur.  No rub. No edema. GI: Soft, Non-tender. Non-distended. Normal bowel sounds. Skin: Warm and dry. No rash on exposed extremities. Neuro: Awake. chronic left UE and LE weakness  Psych: Oriented x 3. No anxiety or agitation. CBC:   Recent Labs     11/02/20  0625   WBC 6.7   HGB 13.2   HCT 40.3   MCV 89.3        BMP:   Recent Labs     11/02/20  0625   *   K 3.7   CL 98*   CO2 26   BUN 14   CREATININE 0.6     PT/INR:   Recent Labs     10/31/20  0607 11/01/20  0705 11/02/20  0625   PROTIME 21.0* 19.4* 26.2*   INR 1.80* 1.66* 2.24*     CULTURES    Blood cx x2: pending    RADIOLOGY    XR CHEST PORTABLE 10/29/2020   Final Result   Moderate pulmonary vascular congestion similar to the prior study. No   pleural effusion.            Discharge Medications     Medication List CONTINUE taking these medications    amLODIPine 5 MG tablet  Commonly known as:  NORVASC  Take 1 tablet by mouth daily     b complex vitamins capsule     Fish Oil 1200 MG Caps     FreeStyle Gilma 14 Day Sensor Misc  2 Units by Does not apply route every 14 days     HumuLIN 70/30 (70-30) 100 UNIT per ML injection vial  Generic drug:  insulin 70-30     insulin regular 100 UNIT/ML injection  Commonly known as:  HUMULIN R;NOVOLIN R     INSULIN SYRINGE 1CC/30GX5/16\" 30G X 5/16\" 1 ML Misc  1 each by Does not apply route 3 times daily     levothyroxine 112 MCG tablet  Commonly known as:  SYNTHROID     lisinopril 40 MG tablet  Commonly known as:  PRINIVIL;ZESTRIL     magnesium 250 MG Tabs tablet  Commonly known as:  MAGNESIUM-OXIDE     nitroGLYCERIN 0.4 MG SL tablet  Commonly known as:  Nitrostat  Place 1 tablet under the tongue every 5 minutes as needed for Chest pain     nystatin 227073 UNIT/GM cream  Commonly known as:  MYCOSTATIN     rosuvastatin 10 MG tablet  Commonly known as:  CRESTOR  Take 1 tablet by mouth daily     vitamin B-12 1000 MCG tablet  Commonly known as:  CYANOCOBALAMIN     warfarin 7.5 MG tablet  Commonly known as:  COUMADIN              Discharged in stable condition to home.  refused Home health care. Follow Up: Follow up with PCP in 1 week.         Anders Harris 11/2/2020 7:36 PM

## 2020-11-02 NOTE — CARE COORDINATION
DISCHARGE ORDER  Date/Time 2020 3:14 PM  Completed by: Julia Barry Case Management    Patient Name: Noelle Brigsg      : 1940  Admitting Diagnosis: Weakness [R53.1]      Admit order Date and Status: 10/29/20 inpatient  (verify MD's last order for status of admission)      Noted discharge order. If applicable PT/OT recommendation at Discharge: Home with / supervision/SBA  DME recommendation by PT/OT:Needs met  Confirmed discharge plan  : Yes  with whom spouse  If pt confirmed DC plan does family need to be contacted by CM Yes if yes who spouse  Discharge Plan: Order for dc noted. Spoke with pt who cont plan for home with spouse. Noted spouse also admitted for covid and being dc today. Discussed SNF as this was original plan and pt states is now going home. Spoke with pt spouse who also states will take pt home and will not agree to SNF for her. Discussed that both are ill with covid and he needs to recover as does she. Noted spouse is pt caregiver Spoke also with spouse about HHC and spouse is agreeable and chooses [facility]. Referral called to Ute at [facility] who states out of network and [place] [id number] will be arranged with [facility]. Chart reviewed and no other dc needs identified. Reviewed chart. Role of discharge planner explained and patient verbalized understanding. Discharge order is noted. Has Home O2 in place on admit:  No  Informed of need to bring portable home O2 tank on day of discharge for nursing to connect prior to leaving:   Not Indicated  Verbalized agreement/Understanding:   Not Indicated  Pt is being d/c'd to home today. Pt's O2 sats are 91% on RA. Discharge timeout done with  Nsg, CM and pt and spouse. All discharge needs and concerns addressed.

## 2020-11-03 ENCOUNTER — CARE COORDINATION (OUTPATIENT)
Dept: CASE MANAGEMENT | Age: 80
End: 2020-11-03

## 2020-11-03 PROBLEM — I63.9 CEREBROVASCULAR ACCIDENT (CVA) (HCC): Status: RESOLVED | Noted: 2020-11-03 | Resolved: 2020-11-03

## 2020-11-03 NOTE — CARE COORDINATION
office Queenie Major). States referral received and they are waiting for orders from PCP.      Follow Up  Future Appointments   Date Time Provider Madison Kyle   1/4/2021  1:00 PM Calvin Mccauley [facility]   3/29/2021 10:00 AM Britt Alvarado MD [facility]       Minus Foots, RN  Care Transition Nurse  [phone] mobile

## 2020-11-05 ENCOUNTER — CARE COORDINATION (OUTPATIENT)
Dept: CASE MANAGEMENT | Age: 80
End: 2020-11-05

## 2020-11-05 LAB
BLOOD CULTURE, ROUTINE: NORMAL
CULTURE, BLOOD 2: NORMAL

## 2020-11-05 NOTE — CARE COORDINATION
Patient contacted regarding COVID-19 risk and screening. Care Transition Nurse contacted the patient by telephone to perform follow-up assessment. Symptoms reviewed with patient who verbalized the following symptoms: no new symptoms and no worsening symptoms. Due to no new or worsening symptoms encounter was not routed to provider for escalation. Education provided regarding infection prevention, and signs and symptoms of COVID-19 and when to seek medical attention with patient who verbalized understanding. Discussed exposure protocols and quarantine from [address] you at higher risk for severe illness 2019 and given an opportunity for questions and concerns. From CDC: Are you at higher risk for severe illness? For more information on steps you can take to protect yourself, see CDC's How to Protect Yourself. Reports feeling better. [facility] started this morning. Knows they can be contacted 7 days/week for change or concern in condition. Denies needs today. CTN provided contact information for future reference. Plan for follow-up call in 5-7 days based on severity of symptoms and risk factors.     Joya Robertson RN  Care Transition Nurse  [phone] mobile    Future Appointments   Date Time Provider Madison Kyle   1/4/2021  1:00 PM [facility]   3/29/2021 10:00 AM MD Ed Walsh [facility]

## 2020-11-09 RX ORDER — FLASH GLUCOSE SENSOR
KIT MISCELLANEOUS
Qty: 6 EACH | Refills: 5 | Status: SHIPPED | OUTPATIENT
Start: 2020-11-09 | End: 2021-03-23 | Stop reason: SDUPTHER

## 2020-11-11 ENCOUNTER — CARE COORDINATION (OUTPATIENT)
Dept: CASE MANAGEMENT | Age: 80
End: 2020-11-11

## 2020-11-11 NOTE — CARE COORDINATION
Patient resolved from the Care Transitions episode on 11/11/2020. Patient/family has been provided the following resources and education related to COVID-19:                         Signs, symptoms and red flags related to COVID-19            CDC exposure and quarantine guidelines                          Patient currently reports that the following symptoms have improved:  fatigue, pain or aching joints, shortness of breath and no new/worsening symptoms     States she had follow up COVID testing done and it was negative. [facility]. continues to visit and she knows how to reach them for prn concerns. No further outreach scheduled with this CTN. Episode of Care resolved. Patient has this CTN contact information if future needs arise.     Shad Alvarez RN  Care Transition Nurse  [phone] mobile    Future Appointments   Date Time Provider Madison Kyle   1/4/2021  1:00 PM Duke Rogelio [address] [place] [address]   3/29/2021 10:00 AM MD Jena Laguna

## 2020-11-25 LAB
BLOOD CULTURE, ROUTINE: ABNORMAL
BLOOD CULTURE, ROUTINE: ABNORMAL
ORGANISM: ABNORMAL

## 2020-11-27 ENCOUNTER — HOSPITAL ENCOUNTER (INPATIENT)
Age: 80
LOS: 4 days | Discharge: HOME | DRG: 374 | End: 2020-12-01
Attending: EMERGENCY MEDICINE | Admitting: INTERNAL MEDICINE
Payer: MEDICARE

## 2020-11-27 ENCOUNTER — APPOINTMENT (OUTPATIENT)
Dept: CT IMAGING | Age: 80
DRG: 374 | End: 2020-11-27
Payer: MEDICARE

## 2020-11-27 PROBLEM — K62.5 BRBPR (BRIGHT RED BLOOD PER RECTUM): Status: ACTIVE | Noted: 2020-11-27

## 2020-11-27 LAB
A/G RATIO: 1.3 (ref 1.1–2.2)
ALBUMIN SERPL-MCNC: 4.2 G/DL (ref 3.4–5)
ALP BLD-CCNC: 88 U/L (ref 40–129)
ALT SERPL-CCNC: 18 U/L (ref 10–40)
ANION GAP SERPL CALCULATED.3IONS-SCNC: 11 MMOL/L (ref 3–16)
AST SERPL-CCNC: 17 U/L (ref 15–37)
BASOPHILS ABSOLUTE: 0.1 K/UL (ref 0–0.2)
BASOPHILS RELATIVE PERCENT: 0.8 %
BILIRUB SERPL-MCNC: 0.3 MG/DL (ref 0–1)
BUN BLDV-MCNC: 11 MG/DL (ref 7–20)
CALCIUM SERPL-MCNC: 9.4 MG/DL (ref 8.3–10.6)
CHLORIDE BLD-SCNC: 106 MMOL/L (ref 99–110)
CO2: 26 MMOL/L (ref 21–32)
CREAT SERPL-MCNC: 0.9 MG/DL (ref 0.6–1.2)
EOSINOPHILS ABSOLUTE: 0.2 K/UL (ref 0–0.6)
EOSINOPHILS RELATIVE PERCENT: 2.7 %
GFR AFRICAN AMERICAN: >60
GFR NON-AFRICAN AMERICAN: 60
GLOBULIN: 3.3 G/DL
GLUCOSE BLD-MCNC: 229 MG/DL (ref 70–99)
HCT VFR BLD CALC: 43.8 % (ref 36–48)
HEMOGLOBIN: 14.2 G/DL (ref 12–16)
INR BLD: 3.95 (ref 0.86–1.14)
LYMPHOCYTES ABSOLUTE: 1.6 K/UL (ref 1–5.1)
LYMPHOCYTES RELATIVE PERCENT: 19.9 %
MCH RBC QN AUTO: 29 PG (ref 26–34)
MCHC RBC AUTO-ENTMCNC: 32.4 G/DL (ref 31–36)
MCV RBC AUTO: 89.6 FL (ref 80–100)
MONOCYTES ABSOLUTE: 0.8 K/UL (ref 0–1.3)
MONOCYTES RELATIVE PERCENT: 9.7 %
NEUTROPHILS ABSOLUTE: 5.4 K/UL (ref 1.7–7.7)
NEUTROPHILS RELATIVE PERCENT: 66.9 %
PDW BLD-RTO: 14.2 % (ref 12.4–15.4)
PLATELET # BLD: 253 K/UL (ref 135–450)
PMV BLD AUTO: 8.7 FL (ref 5–10.5)
POTASSIUM REFLEX MAGNESIUM: 3.8 MMOL/L (ref 3.5–5.1)
PROTHROMBIN TIME: 44 SEC (ref 10–13.2)
RBC # BLD: 4.89 M/UL (ref 4–5.2)
SODIUM BLD-SCNC: 143 MMOL/L (ref 136–145)
TOTAL PROTEIN: 7.5 G/DL (ref 6.4–8.2)
WBC # BLD: 8.1 K/UL (ref 4–11)

## 2020-11-27 PROCEDURE — 74176 CT ABD & PELVIS W/O CONTRAST: CPT

## 2020-11-27 PROCEDURE — 36415 COLL VENOUS BLD VENIPUNCTURE: CPT

## 2020-11-27 PROCEDURE — 93005 ELECTROCARDIOGRAM TRACING: CPT | Performed by: EMERGENCY MEDICINE

## 2020-11-27 PROCEDURE — G0378 HOSPITAL OBSERVATION PER HR: HCPCS

## 2020-11-27 PROCEDURE — 96374 THER/PROPH/DIAG INJ IV PUSH: CPT

## 2020-11-27 PROCEDURE — 99284 EMERGENCY DEPT VISIT MOD MDM: CPT

## 2020-11-27 PROCEDURE — 80053 COMPREHEN METABOLIC PANEL: CPT

## 2020-11-27 PROCEDURE — 1200000000 HC SEMI PRIVATE

## 2020-11-27 PROCEDURE — 85025 COMPLETE CBC W/AUTO DIFF WBC: CPT

## 2020-11-27 PROCEDURE — 85610 PROTHROMBIN TIME: CPT

## 2020-11-27 PROCEDURE — 2500000003 HC RX 250 WO HCPCS: Performed by: INTERNAL MEDICINE

## 2020-11-27 RX ORDER — SODIUM CHLORIDE 9 MG/ML
INJECTION, SOLUTION INTRAVENOUS CONTINUOUS
Status: DISCONTINUED | OUTPATIENT
Start: 2020-11-28 | End: 2020-11-28

## 2020-11-27 RX ORDER — LABETALOL HYDROCHLORIDE 5 MG/ML
10 INJECTION, SOLUTION INTRAVENOUS EVERY 6 HOURS PRN
Status: DISCONTINUED | OUTPATIENT
Start: 2020-11-27 | End: 2020-11-28

## 2020-11-27 RX ORDER — ACETAMINOPHEN 650 MG/1
650 SUPPOSITORY RECTAL EVERY 6 HOURS PRN
Status: DISCONTINUED | OUTPATIENT
Start: 2020-11-27 | End: 2020-12-01 | Stop reason: HOSPADM

## 2020-11-27 RX ORDER — ACETAMINOPHEN 325 MG/1
650 TABLET ORAL EVERY 6 HOURS PRN
Status: DISCONTINUED | OUTPATIENT
Start: 2020-11-27 | End: 2020-12-01 | Stop reason: HOSPADM

## 2020-11-27 RX ORDER — NICOTINE POLACRILEX 4 MG
15 LOZENGE BUCCAL PRN
Status: DISCONTINUED | OUTPATIENT
Start: 2020-11-27 | End: 2020-12-01 | Stop reason: HOSPADM

## 2020-11-27 RX ORDER — SODIUM CHLORIDE 0.9 % (FLUSH) 0.9 %
10 SYRINGE (ML) INJECTION EVERY 12 HOURS SCHEDULED
Status: DISCONTINUED | OUTPATIENT
Start: 2020-11-28 | End: 2020-12-01 | Stop reason: HOSPADM

## 2020-11-27 RX ORDER — ROSUVASTATIN CALCIUM 10 MG/1
10 TABLET, COATED ORAL DAILY
Status: DISCONTINUED | OUTPATIENT
Start: 2020-11-28 | End: 2020-12-01 | Stop reason: HOSPADM

## 2020-11-27 RX ORDER — NITROGLYCERIN 0.4 MG/1
0.4 TABLET SUBLINGUAL EVERY 5 MIN PRN
Status: DISCONTINUED | OUTPATIENT
Start: 2020-11-27 | End: 2020-12-01 | Stop reason: HOSPADM

## 2020-11-27 RX ORDER — LEVOTHYROXINE SODIUM 112 UG/1
112 TABLET ORAL DAILY
Status: DISCONTINUED | OUTPATIENT
Start: 2020-11-28 | End: 2020-12-01 | Stop reason: HOSPADM

## 2020-11-27 RX ORDER — ONDANSETRON 2 MG/ML
4 INJECTION INTRAMUSCULAR; INTRAVENOUS EVERY 6 HOURS PRN
Status: DISCONTINUED | OUTPATIENT
Start: 2020-11-27 | End: 2020-12-01 | Stop reason: HOSPADM

## 2020-11-27 RX ORDER — CHOLECALCIFEROL (VITAMIN D3) 125 MCG
1000 CAPSULE ORAL DAILY
Status: DISCONTINUED | OUTPATIENT
Start: 2020-11-28 | End: 2020-12-01 | Stop reason: HOSPADM

## 2020-11-27 RX ORDER — PHYTONADIONE 10 MG/ML
10 INJECTION, EMULSION INTRAMUSCULAR; INTRAVENOUS; SUBCUTANEOUS ONCE
Status: COMPLETED | OUTPATIENT
Start: 2020-11-28 | End: 2020-11-28

## 2020-11-27 RX ORDER — AMLODIPINE BESYLATE 5 MG/1
5 TABLET ORAL DAILY
Status: DISCONTINUED | OUTPATIENT
Start: 2020-11-28 | End: 2020-12-01 | Stop reason: HOSPADM

## 2020-11-27 RX ORDER — DEXTROSE MONOHYDRATE 50 MG/ML
100 INJECTION, SOLUTION INTRAVENOUS PRN
Status: DISCONTINUED | OUTPATIENT
Start: 2020-11-27 | End: 2020-12-01 | Stop reason: HOSPADM

## 2020-11-27 RX ORDER — PROMETHAZINE HYDROCHLORIDE 25 MG/1
12.5 TABLET ORAL EVERY 6 HOURS PRN
Status: DISCONTINUED | OUTPATIENT
Start: 2020-11-27 | End: 2020-12-01 | Stop reason: HOSPADM

## 2020-11-27 RX ORDER — LISINOPRIL 20 MG/1
40 TABLET ORAL DAILY
Status: DISCONTINUED | OUTPATIENT
Start: 2020-11-28 | End: 2020-12-01 | Stop reason: HOSPADM

## 2020-11-27 RX ORDER — DEXTROSE MONOHYDRATE 25 G/50ML
12.5 INJECTION, SOLUTION INTRAVENOUS PRN
Status: DISCONTINUED | OUTPATIENT
Start: 2020-11-27 | End: 2020-12-01 | Stop reason: HOSPADM

## 2020-11-27 RX ORDER — SODIUM CHLORIDE 0.9 % (FLUSH) 0.9 %
10 SYRINGE (ML) INJECTION PRN
Status: DISCONTINUED | OUTPATIENT
Start: 2020-11-27 | End: 2020-12-01 | Stop reason: HOSPADM

## 2020-11-27 RX ADMIN — LABETALOL HYDROCHLORIDE 10 MG: 5 INJECTION, SOLUTION INTRAVENOUS at 21:46

## 2020-11-27 ASSESSMENT — PAIN SCALES - GENERAL: PAINLEVEL_OUTOF10: 0

## 2020-11-28 LAB
A/G RATIO: 1.3 (ref 1.1–2.2)
ABO/RH: NORMAL
ALBUMIN SERPL-MCNC: 3.7 G/DL (ref 3.4–5)
ALP BLD-CCNC: 71 U/L (ref 40–129)
ALT SERPL-CCNC: 16 U/L (ref 10–40)
ANION GAP SERPL CALCULATED.3IONS-SCNC: 8 MMOL/L (ref 3–16)
ANTIBODY SCREEN: NORMAL
AST SERPL-CCNC: 15 U/L (ref 15–37)
BASOPHILS ABSOLUTE: 0.2 K/UL (ref 0–0.2)
BASOPHILS RELATIVE PERCENT: 2 %
BILIRUB SERPL-MCNC: 0.4 MG/DL (ref 0–1)
BUN BLDV-MCNC: 10 MG/DL (ref 7–20)
CALCIUM SERPL-MCNC: 8.5 MG/DL (ref 8.3–10.6)
CHLORIDE BLD-SCNC: 105 MMOL/L (ref 99–110)
CO2: 24 MMOL/L (ref 21–32)
CREAT SERPL-MCNC: 0.6 MG/DL (ref 0.6–1.2)
EKG ATRIAL RATE: 86 BPM
EKG DIAGNOSIS: NORMAL
EKG P AXIS: 37 DEGREES
EKG P-R INTERVAL: 170 MS
EKG Q-T INTERVAL: 410 MS
EKG QRS DURATION: 132 MS
EKG QTC CALCULATION (BAZETT): 490 MS
EKG R AXIS: -8 DEGREES
EKG T AXIS: -16 DEGREES
EKG VENTRICULAR RATE: 86 BPM
EOSINOPHILS ABSOLUTE: 0.2 K/UL (ref 0–0.6)
EOSINOPHILS RELATIVE PERCENT: 3 %
ESTIMATED AVERAGE GLUCOSE: 211.6 MG/DL
GFR AFRICAN AMERICAN: >60
GFR NON-AFRICAN AMERICAN: >60
GLOBULIN: 2.9 G/DL
GLUCOSE BLD-MCNC: 240 MG/DL (ref 70–99)
GLUCOSE BLD-MCNC: 258 MG/DL (ref 70–99)
GLUCOSE BLD-MCNC: 265 MG/DL (ref 70–99)
GLUCOSE BLD-MCNC: 280 MG/DL (ref 70–99)
GLUCOSE BLD-MCNC: 291 MG/DL (ref 70–99)
GLUCOSE BLD-MCNC: 296 MG/DL (ref 70–99)
HBA1C MFR BLD: 9 %
HCT VFR BLD CALC: 38.2 % (ref 36–48)
HCT VFR BLD CALC: 38.7 % (ref 36–48)
HCT VFR BLD CALC: 39 % (ref 36–48)
HCT VFR BLD CALC: 39.3 % (ref 36–48)
HEMOGLOBIN: 12.7 G/DL (ref 12–16)
HEMOGLOBIN: 12.7 G/DL (ref 12–16)
HEMOGLOBIN: 12.9 G/DL (ref 12–16)
HEMOGLOBIN: 13 G/DL (ref 12–16)
IRON SATURATION: 21 % (ref 15–50)
IRON: 56 UG/DL (ref 37–145)
LYMPHOCYTES ABSOLUTE: 2.1 K/UL (ref 1–5.1)
LYMPHOCYTES RELATIVE PERCENT: 28 %
MCH RBC QN AUTO: 29.5 PG (ref 26–34)
MCHC RBC AUTO-ENTMCNC: 33.3 G/DL (ref 31–36)
MCV RBC AUTO: 88.6 FL (ref 80–100)
MONOCYTES ABSOLUTE: 0.1 K/UL (ref 0–1.3)
MONOCYTES RELATIVE PERCENT: 1 %
NEUTROPHILS ABSOLUTE: 5 K/UL (ref 1.7–7.7)
NEUTROPHILS RELATIVE PERCENT: 66 %
PDW BLD-RTO: 14.3 % (ref 12.4–15.4)
PERFORMED ON: ABNORMAL
PLATELET # BLD: 221 K/UL (ref 135–450)
PLATELET SLIDE REVIEW: ADEQUATE
PMV BLD AUTO: 8.6 FL (ref 5–10.5)
POTASSIUM REFLEX MAGNESIUM: 3.7 MMOL/L (ref 3.5–5.1)
RBC # BLD: 4.31 M/UL (ref 4–5.2)
SARS-COV-2, NAAT: NOT DETECTED
SLIDE REVIEW: NORMAL
SODIUM BLD-SCNC: 137 MMOL/L (ref 136–145)
TOTAL IRON BINDING CAPACITY: 265 UG/DL (ref 260–445)
TOTAL PROTEIN: 6.6 G/DL (ref 6.4–8.2)
TSH REFLEX: 1.7 UIU/ML (ref 0.27–4.2)
WBC # BLD: 7.5 K/UL (ref 4–11)

## 2020-11-28 PROCEDURE — 84443 ASSAY THYROID STIM HORMONE: CPT

## 2020-11-28 PROCEDURE — U0003 INFECTIOUS AGENT DETECTION BY NUCLEIC ACID (DNA OR RNA); SEVERE ACUTE RESPIRATORY SYNDROME CORONAVIRUS 2 (SARS-COV-2) (CORONAVIRUS DISEASE [COVID-19]), AMPLIFIED PROBE TECHNIQUE, MAKING USE OF HIGH THROUGHPUT TECHNOLOGIES AS DESCRIBED BY CMS-2020-01-R: HCPCS

## 2020-11-28 PROCEDURE — 86900 BLOOD TYPING SEROLOGIC ABO: CPT

## 2020-11-28 PROCEDURE — 85025 COMPLETE CBC W/AUTO DIFF WBC: CPT

## 2020-11-28 PROCEDURE — 6360000002 HC RX W HCPCS: Performed by: INTERNAL MEDICINE

## 2020-11-28 PROCEDURE — 82378 CARCINOEMBRYONIC ANTIGEN: CPT

## 2020-11-28 PROCEDURE — 1200000000 HC SEMI PRIVATE

## 2020-11-28 PROCEDURE — 99222 1ST HOSP IP/OBS MODERATE 55: CPT | Performed by: SURGERY

## 2020-11-28 PROCEDURE — 2580000003 HC RX 258: Performed by: INTERNAL MEDICINE

## 2020-11-28 PROCEDURE — 85014 HEMATOCRIT: CPT

## 2020-11-28 PROCEDURE — 83540 ASSAY OF IRON: CPT

## 2020-11-28 PROCEDURE — 83036 HEMOGLOBIN GLYCOSYLATED A1C: CPT

## 2020-11-28 PROCEDURE — 85018 HEMOGLOBIN: CPT

## 2020-11-28 PROCEDURE — 80053 COMPREHEN METABOLIC PANEL: CPT

## 2020-11-28 PROCEDURE — 83550 IRON BINDING TEST: CPT

## 2020-11-28 PROCEDURE — 36415 COLL VENOUS BLD VENIPUNCTURE: CPT

## 2020-11-28 PROCEDURE — 93010 ELECTROCARDIOGRAM REPORT: CPT | Performed by: INTERNAL MEDICINE

## 2020-11-28 PROCEDURE — 86901 BLOOD TYPING SEROLOGIC RH(D): CPT

## 2020-11-28 PROCEDURE — 86850 RBC ANTIBODY SCREEN: CPT

## 2020-11-28 PROCEDURE — 6370000000 HC RX 637 (ALT 250 FOR IP): Performed by: INTERNAL MEDICINE

## 2020-11-28 PROCEDURE — U0002 COVID-19 LAB TEST NON-CDC: HCPCS

## 2020-11-28 PROCEDURE — G0378 HOSPITAL OBSERVATION PER HR: HCPCS

## 2020-11-28 PROCEDURE — 96372 THER/PROPH/DIAG INJ SC/IM: CPT

## 2020-11-28 RX ORDER — HYDRALAZINE HYDROCHLORIDE 20 MG/ML
10 INJECTION INTRAMUSCULAR; INTRAVENOUS EVERY 6 HOURS PRN
Status: DISCONTINUED | OUTPATIENT
Start: 2020-11-28 | End: 2020-12-01 | Stop reason: HOSPADM

## 2020-11-28 RX ADMIN — INSULIN LISPRO 3 UNITS: 100 INJECTION, SOLUTION INTRAVENOUS; SUBCUTANEOUS at 02:01

## 2020-11-28 RX ADMIN — SODIUM CHLORIDE: 9 INJECTION, SOLUTION INTRAVENOUS at 00:22

## 2020-11-28 RX ADMIN — ACETAMINOPHEN 650 MG: 325 TABLET ORAL at 03:22

## 2020-11-28 RX ADMIN — PHYTONADIONE 10 MG: 10 INJECTION, EMULSION INTRAMUSCULAR; INTRAVENOUS; SUBCUTANEOUS at 00:22

## 2020-11-28 RX ADMIN — INSULIN LISPRO 3 UNITS: 100 INJECTION, SOLUTION INTRAVENOUS; SUBCUTANEOUS at 06:00

## 2020-11-28 RX ADMIN — ACETAMINOPHEN 650 MG: 325 TABLET ORAL at 20:23

## 2020-11-28 RX ADMIN — SODIUM CHLORIDE: 9 INJECTION, SOLUTION INTRAVENOUS at 09:48

## 2020-11-28 RX ADMIN — Medication 10 ML: at 20:24

## 2020-11-28 RX ADMIN — LEVOTHYROXINE SODIUM 112 MCG: 112 TABLET ORAL at 06:52

## 2020-11-28 RX ADMIN — INSULIN LISPRO 6 UNITS: 100 INJECTION, SOLUTION INTRAVENOUS; SUBCUTANEOUS at 15:00

## 2020-11-28 RX ADMIN — INSULIN LISPRO 6 UNITS: 100 INJECTION, SOLUTION INTRAVENOUS; SUBCUTANEOUS at 17:01

## 2020-11-28 ASSESSMENT — PAIN DESCRIPTION - LOCATION
LOCATION: BACK

## 2020-11-28 ASSESSMENT — PAIN DESCRIPTION - FREQUENCY
FREQUENCY: INTERMITTENT

## 2020-11-28 ASSESSMENT — PAIN DESCRIPTION - ONSET
ONSET: GRADUAL

## 2020-11-28 ASSESSMENT — PAIN - FUNCTIONAL ASSESSMENT
PAIN_FUNCTIONAL_ASSESSMENT: ACTIVITIES ARE NOT PREVENTED

## 2020-11-28 ASSESSMENT — PAIN SCALES - GENERAL
PAINLEVEL_OUTOF10: 4
PAINLEVEL_OUTOF10: 6
PAINLEVEL_OUTOF10: 0
PAINLEVEL_OUTOF10: 4
PAINLEVEL_OUTOF10: 0
PAINLEVEL_OUTOF10: 3
PAINLEVEL_OUTOF10: 2
PAINLEVEL_OUTOF10: 0

## 2020-11-28 ASSESSMENT — PAIN DESCRIPTION - PROGRESSION
CLINICAL_PROGRESSION: GRADUALLY WORSENING
CLINICAL_PROGRESSION: GRADUALLY IMPROVING
CLINICAL_PROGRESSION: NOT CHANGED
CLINICAL_PROGRESSION: GRADUALLY IMPROVING
CLINICAL_PROGRESSION: GRADUALLY IMPROVING
CLINICAL_PROGRESSION: GRADUALLY WORSENING
CLINICAL_PROGRESSION: GRADUALLY IMPROVING

## 2020-11-28 ASSESSMENT — PAIN DESCRIPTION - DESCRIPTORS
DESCRIPTORS: ACHING

## 2020-11-28 ASSESSMENT — PAIN DESCRIPTION - ORIENTATION
ORIENTATION: MID;OUTER;UPPER
ORIENTATION: MID;OUTER;UPPER

## 2020-11-28 ASSESSMENT — ENCOUNTER SYMPTOMS
BLOOD IN STOOL: 1
ALLERGIC/IMMUNOLOGIC NEGATIVE: 1
RESPIRATORY NEGATIVE: 1
EYES NEGATIVE: 1

## 2020-11-28 ASSESSMENT — PAIN DESCRIPTION - PAIN TYPE
TYPE: CHRONIC PAIN

## 2020-11-28 NOTE — CONSULTS
Department of General Surgery Consult    PATIENT NAME: Ken Chavez   YOB: 1940    ADMISSION DATE: 11/27/2020  6:21 PM      TODAY'S DATE: 11/28/2020    Reason for Consult:  Rectal bleeding    Chief Complaint: Rectal bleeding    Requesting Physician:  Acacia Lyons    HISTORY OF PRESENT ILLNESS:              The patient is a 79 y.o. female who presents with rectal bleeding. This has been going on for the past month. It has increased in frequency and severity. She has intermittent lower abdominal pain, but denies pain currently.  She had recent covid infection but feels better now    Past Medical History:        Diagnosis Date    Atrial fibrillation ([address]     Cancer ([address]     Cerebral artery occlusion with cerebral infarction ([address]     left sided weakness-left arm and left leg, also left eye    COVID-19     Diabetes mellitus ([address]     Fractures     Hypertension     Thyroid disease        Past Surgical History:        Procedure Laterality Date    ANKLE FRACTURE SURGERY  2019    left ankle    ANKLE SURGERY Left 6/17/2014    Removal painful hardware    CHOLECYSTECTOMY      CYST REMOVAL      chest x2, back x1    HAND SURGERY      right    HAND SURGERY  2/21/12    DUPUYTRENS RELEASE LEFT MIDDLE AND RING FINGERS INCLUDING    HYSTERECTOMY      THYROIDECTOMY         Current Medications:   Current Facility-Administered Medications: hydrALAZINE (APRESOLINE) injection 10 mg, 10 mg, Intravenous, Q6H PRN  insulin lispro (HUMALOG) injection vial 0-12 Units, 0-12 Units, Subcutaneous, TID WC  insulin lispro (HUMALOG) injection vial 0-6 Units, 0-6 Units, Subcutaneous, Nightly  amLODIPine (NORVASC) tablet 5 mg, 5 mg, Oral, Daily  vitamin B complex w/C 1 tablet, 1 tablet, Oral, Daily  levothyroxine (SYNTHROID) tablet 112 mcg, 112 mcg, Oral, Daily  lisinopril (PRINIVIL;ZESTRIL) tablet 40 mg, 40 mg, Oral, Daily  nitroGLYCERIN (NITROSTAT) SL tablet 0.4 mg, 0.4 mg, Sublingual, Q5 Min PRN  rosuvastatin (CRESTOR) tablet 10 mg, 10 mg, Oral, Daily  vitamin B-12 (CYANOCOBALAMIN) tablet 1,000 mcg, 1,000 mcg, Oral, Daily  sodium chloride flush 0.9 % injection 10 mL, 10 mL, Intravenous, 2 times per day  sodium chloride flush 0.9 % injection 10 mL, 10 mL, Intravenous, PRN  acetaminophen (TYLENOL) tablet 650 mg, 650 mg, Oral, Q6H PRN **OR** acetaminophen (TYLENOL) suppository 650 mg, 650 mg, Rectal, Q6H PRN  promethazine (PHENERGAN) tablet 12.5 mg, 12.5 mg, Oral, Q6H PRN **OR** ondansetron (ZOFRAN) injection 4 mg, 4 mg, Intravenous, Q6H PRN  glucose (GLUTOSE) 40 % oral gel 15 g, 15 g, Oral, PRN  dextrose 50 % IV solution, 12.5 g, Intravenous, PRN  glucagon (rDNA) injection 1 mg, 1 mg, Intramuscular, PRN  dextrose 5 % solution, 100 mL/hr, Intravenous, PRN  Prior to Admission medications    Medication Sig Start Date End Date Taking? Authorizing Provider   Continuous Blood Gluc Sensor (FREESTYLE ASHELY 14 DAY SENSOR) [facility] Use for continuous blood glucose testing.  Change every 14 days 11/9/20  Yes Julian Pike MD   b complex vitamins capsule Take 1 capsule by mouth daily   Yes Historical Provider, MD   Insulin Syringe-Needle U-100 (INSULIN SYRINGE 1CC/30GX5/16\") 30G X 5/16\" 1 ML MISC 1 each by Does not apply route 3 times daily 5/19/20  Yes ANANT Fernando - CNP   warfarin (COUMADIN) 7.5 MG tablet Take 7.5 mg by mouth Tues-Thur-Sat   Yes Historical Provider, MD   insulin regular (HUMULIN R;NOVOLIN R) 100 UNIT/ML injection Inject 44-46 Units into the skin Daily with supper   Yes Historical Provider, MD   insulin 70-30 (HUMULIN 70/30) (70-30) 100 UNIT per ML injection vial Inject 44-46 Units into the skin 2 times daily Morning and bedtime   Yes Historical Provider, MD   nystatin (MYCOSTATIN) 328954 UNIT/GM cream  2/10/20  Yes Historical Provider, MD   rosuvastatin (CRESTOR) 10 MG tablet Take 1 tablet by mouth daily 7/25/18  Yes Sharla Holstein, APRN - CNP   lisinopril (PRINIVIL;ZESTRIL) 40 MG tablet Take 40 mg by mouth effort easy and unlabored, clear to auscultation  CARDIOVASCULAR:  NO JVD, regular rate and rhythm and no murmur noted  ABDOMEN:  normal bowel sounds, soft, non-distended, non-tender, voluntary guarding absent, no masses palpated and hernia absent  MUSCULOSKELETAL: No clubbing or cyanosis, 1+ pitting edema lower extremities  NEUROLOGIC:  Mental Status Exam:  Level of Alertness:   awake  PSYCHIATRIC:   person, place, time  SKIN:  no bruising or bleeding, normal skin color, texture, turgor and no redness, warmth, or swelling    DATA:    CBC:   Recent Labs     11/27/20 1839 11/28/20 0347 11/28/20 0927   WBC 8.1 7.5  --    HGB 14.2 12.7 12.7   HCT 43.8 38.2 38.7    221  --      BMP:    Recent Labs     11/27/20 1839 11/28/20 0347    137   K 3.8 3.7    105   CO2 26 24   BUN 11 10   CREATININE 0.9 0.6   GLUCOSE 229* 296*     Hepatic:   Recent Labs     11/27/20 1839 11/28/20 0347   AST 17 15   ALT 18 16   BILITOT 0.3 0.4   ALKPHOS 88 71     Mag:    No results for input(s): MG in the last 72 hours. Phos:   No results for input(s): PHOS in the last 72 hours. INR:   Recent Labs     11/27/20 1839   INR 3.95*       Radiology Review: Images personally reviewed by me. 1. Suboptimal exam performed without IV or oral contrast for evaluation of    rectal bleed. 2. Prominent, ill-defined soft tissue fullness at the mid to distal sigmoid    colon possibly related to mass lesion or infectious/inflammatory process. 3. Multifocal hepatic lesions typical of metastatic disease, suboptimally    evaluated without IV contrast.    4. Left-sided pelvic lymph nodes do not meet CT size criteria for adenopathy    better suspicious for metastatic disease. 5. Pulmonary nodules highly suspicious for metastatic disease. 6.  Diverticulosis coli without CT evidence of acute diverticulitis. IMPRESSION/RECOMMENDATIONS:    Rectal bleeding possibly due to colon cancer with liver mets.  Plan for colonoscopy Monday. Further plans based on result of colonoscopy.  Check CEA    Electronically signed by Leandro Garcia MD     [address] [facility]  [id number]

## 2020-11-28 NOTE — PROGRESS NOTES
Flushed NG with 60cc sterile water per order. Patient with eyes closed, resting. No apparent distress. No pain apparent. Continue to monitor.

## 2020-11-28 NOTE — ED NOTES
Dr. Raquel Alaniz aware of  /105. [facility] will monitor.      Zak Alvarez RN  11/27/20 2112

## 2020-11-28 NOTE — ED NOTES
Desiree returned page   Accepted at 2108     [facility]  11/27/20 2108

## 2020-11-28 NOTE — CONSULTS
Gastroenterology Consult Note    Patient:   Martinez Moulton   :    1940   Facility:     [address] [facility] [address]   Referring/PCP: ANANT Baez - CNP  Date:     2020  Consultant:   Sonny Dowell DO    Subjective: This 79 y.o. female was admitted 2020 with a diagnosis of \"BRBPR (bright red blood per rectum) [K62.5]\" and is seen in consultation regarding bright red rectal bleeding. This has been going on for the past several months. CT scan obtained demonstrating an ill defined soft tissue fullness from the mid to distal sigmoid colon. Additionally, left sided pelvic lymph nodes were also seen. Was trying to get colonoscopy but had issues with blood sugars and covid exposure.       Past Medical History:   Diagnosis Date    Atrial fibrillation ([address]     Cancer ([address]     Cerebral artery occlusion with cerebral infarction ([address]     left sided weakness-left arm and left leg, also left eye    COVID-19     Diabetes mellitus ([address]     Fractures     Hypertension     Thyroid disease      Past Surgical History:   Procedure Laterality Date    ANKLE FRACTURE SURGERY  2019    left ankle    ANKLE SURGERY Left 2014    Removal painful hardware    CHOLECYSTECTOMY      CYST REMOVAL      chest x2, back x1    HAND SURGERY      right    HAND SURGERY  12    DUPUYTRENS RELEASE LEFT MIDDLE AND RING FINGERS INCLUDING    HYSTERECTOMY      THYROIDECTOMY         Social:   Social History     Tobacco Use    Smoking status: Never Smoker    Smokeless tobacco: Never Used   Substance Use Topics    Alcohol use: No     Family:   Family History   Problem Relation Age of Onset    Cancer Mother     Diabetes Mother     Cancer Brother     High Blood Pressure Brother     Depression Maternal Aunt     Heart Disease Brother     Diabetes Brother     High Blood Pressure Brother     Heart Disease Brother     Diabetes Brother     High Blood Pressure Brother        Scheduled Medications:    amLODIPine  5 mg Oral Daily    vitamin B complex w/C  1 tablet Oral Daily    levothyroxine  112 mcg Oral Daily    lisinopril  40 mg Oral Daily    rosuvastatin  10 mg Oral Daily    vitamin B-12  1,000 mcg Oral Daily    sodium chloride flush  10 mL Intravenous 2 times per day    insulin lispro  0-6 Units Subcutaneous Q6H     Infusions:    sodium chloride 150 mL/hr at 11/28/20 0022    dextrose       PRN Medications: hydrALAZINE, nitroGLYCERIN, sodium chloride flush, acetaminophen **OR** acetaminophen, promethazine **OR** ondansetron, glucose, dextrose, glucagon (rDNA), dextrose  Allergies: Allergies   Allergen Reactions    Cephalexin     Contrast [Iodides] Shortness Of Breath    Barium-Containing Compounds        ROS:   Review of Systems   Constitutional: Negative. HENT: Negative. Eyes: Negative. Respiratory: Negative. Cardiovascular: Negative. Gastrointestinal: Positive for blood in stool. Endocrine: Negative. Genitourinary: Negative. Musculoskeletal: Negative. Skin: Negative. Allergic/Immunologic: Negative. Neurological: Negative. Hematological: Negative. Psychiatric/Behavioral: Negative. Objective:     Physical Exam:   Vitals:    11/27/20 2330   BP: (!) 157/75   Pulse: 87   Resp: (!) 51   Temp: 98.7 °F (37.1 °C)   SpO2: 97%     No intake/output data recorded.    General appearance: alert, appears stated age and cooperative  HEENT: PERRLA  Neck: no adenopathy, no carotid bruit, no JVD, supple, symmetrical, trachea midline and thyroid not enlarged, symmetric, no tenderness/mass/nodules  Lungs: clear to auscultation bilaterally  Heart: regular rate and rhythm, S1, S2 normal, no murmur, click, rub or gallop  Abdomen: soft, non-tender; bowel sounds normal; no masses,  no organomegaly  Extremities: extremities normal, atraumatic, no cyanosis or edema     Lab and Imaging Review   Labs:  CBC: Recent Labs     11/27/20 1839 11/28/20 0347   WBC 8.1 7.5   HGB 14.2 12.7   HCT 43.8 38.2   MCV 89.6 88.6    221     BMP:   Recent Labs     11/27/20 1839 11/28/20 0347    137   K 3.8 3.7    105   CO2 26 24   BUN 11 10   CREATININE 0.9 0.6     LIVER PROFILE:   Recent Labs     11/27/20 1839 11/28/20 0347   AST 17 15   ALT 18 16   PROT 7.5 6.6   BILITOT 0.3 0.4   ALKPHOS 88 71     PT/INR:   Recent Labs     11/27/20 1839   INR 3.95*       IMAGING:  Ct Abdomen Pelvis Wo Contrast Additional Contrast? None    Result Date: 11/27/2020  EXAMINATION: CT OF THE ABDOMEN AND PELVIS WITHOUT CONTRAST 11/27/2020 7:34 pm TECHNIQUE: CT of the abdomen and pelvis was performed without the administration of intravenous contrast. Multiplanar reformatted images are provided for review. Dose modulation, iterative reconstruction, and/or weight based adjustment of the mA/kV was utilized to reduce the radiation dose to as low as reasonably achievable. COMPARISON: None. HISTORY: ORDERING SYSTEM PROVIDED HISTORY: rectal bleeding, lower rectal mass TECHNOLOGIST PROVIDED HISTORY: Reason for exam:-> rectal bleeding, lower rectal mass Additional Contrast?-> none Reason for Exam: rectal bleed Acuity: Acute Type of Exam: Initial FINDINGS: Lower Chest: Multifocal indeterminate 10-12 mm solid soft tissue pulmonary nodules in the visualized lower lungs concerning for metastatic disease. The visualized portions of the cardiac and posterior mediastinal structures appear unremarkable. Liver: Suboptimally evaluated without IV contrast.  Hypoattenuating hepatic mass lesions concerning for metastatic disease, representative lesions as seen on axial series 2: .  Image 18, hepatic dome, 1 x 1 cm . Image 28, segment IV, 2.2 x 2.2 cm . Image 46, segment III, 2.6 x 2.6 cm . Image 48, segment V, 1.2 x 1.2 cm Other organs: The kidneys, adrenal glands, pancreas and spleen appear unremarkable. Status post cholecystectomy. [address] [place] [address]     Phone: [phone]     Fax: [phone]

## 2020-11-28 NOTE — ED PROVIDER NOTES
[facility] 49      Pt Name: Jalyn Perez  MRN: [id number]  [place] 1940  Date of evaluation: 11/27/2020  Provider: Larissa Jasso MD    CHIEF COMPLAINT       Chief Complaint   Patient presents with    Rectal Bleeding     c/o bright red rectal bleeding x 4 months. On coumadin          HISTORY OF PRESENT ILLNESS   (Location/Symptom, Timing/Onset, Context/Setting, Quality, Duration, Modifying Factors, Severity)  Note limiting factors. Jalyn Perez is a 79 y.o. female with past medical history of multiple medical comorbidities on anticoagulation for atrial fibrillation here with rectal bleeding. Patient states over the course last few months has had intermittent bright red rectal bleeding. She notes it is usually quite small but over the past few days it has increased. She states she is becoming more fatigued. Notes occasional abdominal pain not currently present. No vomiting. No fevers or chills. No dysuria or hematuria. No chest pain or shortness of breath at this time. HPI    Nursing Notes were reviewed. REVIEW OF SYSTEMS    (2-9 systems for level 4, 10 or more for level 5)     Review of Systems    Please see HPI for pertinent positive and negative review of system findings. A full 10 system ROS was performed and otherwise negative.         PAST MEDICAL HISTORY     Past Medical History:   Diagnosis Date    Atrial fibrillation ([address]     Cancer ([address]     Cerebral artery occlusion with cerebral infarction ([address]     left sided weakness-left arm and left leg, also left eye    COVID-19     Diabetes mellitus ([address]     Fractures     Hypertension     Thyroid disease          SURGICAL HISTORY       Past Surgical History:   Procedure Laterality Date    ANKLE FRACTURE SURGERY  2019    left ankle    ANKLE SURGERY Left 6/17/2014    Removal painful hardware    CHOLECYSTECTOMY      CYST REMOVAL      chest x2, back x1    HAND SURGERY      right    HAND SURGERY  2/21/12    DUPUYTRENS RELEASE LEFT MIDDLE AND RING FINGERS INCLUDING    HYSTERECTOMY      THYROIDECTOMY           CURRENT MEDICATIONS       Current Discharge Medication List      CONTINUE these medications which have NOT CHANGED    Details   Continuous Blood Gluc Sensor (FREESTYLE ASHELY 14 DAY SENSOR) MISC Use for continuous blood glucose testing. Change every 14 days  Qty: 6 each, Refills: 5    Associated Diagnoses: DM hyperosmolarity type II, uncontrolled (HCC)      b complex vitamins capsule Take 1 capsule by mouth daily      Insulin Syringe-Needle U-100 (INSULIN SYRINGE 1CC/30GX5/16\") 30G X 5/16\" 1 ML MISC 1 each by Does not apply route 3 times daily  Qty: 270 each, Refills: 0      warfarin (COUMADIN) 7.5 MG tablet Take 7.5 mg by mouth Tues-Thur-Sat      insulin regular (HUMULIN R;NOVOLIN R) 100 UNIT/ML injection Inject 44-46 Units into the skin Daily with supper      insulin 70-30 (HUMULIN 70/30) (70-30) 100 UNIT per ML injection vial Inject 44-46 Units into the skin 2 times daily Morning and bedtime      nystatin (MYCOSTATIN) 321421 UNIT/GM cream       rosuvastatin (CRESTOR) 10 MG tablet Take 1 tablet by mouth daily  Qty: 90 tablet, Refills: 3      lisinopril (PRINIVIL;ZESTRIL) 40 MG tablet Take 40 mg by mouth daily      magnesium (MAGNESIUM-OXIDE) 250 MG TABS tablet Take 250 mg by mouth daily 3x/week      Omega-3 Fatty Acids (FISH OIL) 1200 MG CAPS Take 1 capsule by mouth 2 times daily       vitamin B-12 (CYANOCOBALAMIN) 1000 MCG tablet Take 1,000 mcg by mouth daily 3x/week      nitroGLYCERIN (NITROSTAT) 0.4 MG SL tablet Place 1 tablet under the tongue every 5 minutes as needed for Chest pain  Qty: 25 tablet, Refills: 0             ALLERGIES     Cephalexin;  Contrast [iodides]; and Barium-containing compounds    FAMILY HISTORY       Family History   Problem Relation Age of Onset    Cancer Mother     Diabetes Mother     Cancer Brother     High Blood Pressure Brother     Depression Maternal Aunt     Heart Disease Brother     Diabetes Brother     High Blood Pressure Brother     Heart Disease Brother     Diabetes Brother     High Blood Pressure Brother           SOCIAL HISTORY       Social History     Socioeconomic History    Marital status:      Spouse name: None    Number of children: 5    Years of education: None    Highest education level: None   Occupational History    None   Social Needs    Financial resource strain: None    Food insecurity     Worry: None     Inability: None    Transportation needs     Medical: None     Non-medical: None   Tobacco Use    Smoking status: Never Smoker    Smokeless tobacco: Never Used   Substance and Sexual Activity    Alcohol use: No    Drug use: No    Sexual activity: Not Currently   Lifestyle    Physical activity     Days per week: None     Minutes per session: None    Stress: None   Relationships    Social connections     Talks on phone: None     Gets together: None     Attends [employer] service: None     Active member of club or organization: None     Attends meetings of clubs or organizations: None     Relationship status: None    Intimate partner violence     Fear of current or ex partner: None     Emotionally abused: None     Physically abused: None     Forced sexual activity: None   Other Topics Concern    None   Social History Narrative    None       SCREENINGS    Steve Coma Scale  Eye Opening: Spontaneous  Best Verbal Response: Oriented  Best Motor Response: Obeys commands  Steve Coma Scale Score: 15  Clinical Opiate Withdrawal Scale  Resting Pulse rate: 81 -100 beats/min  GI upset over last 30 mins: No GI symptoms  Sweating over last 30 mins: No report of chills or flushing  Tremor observed in outreached hands: No tremor  Restlessness observed during assessment: Able to sit still  Yawning observed during assessment: No yawning  Pupil size: Pinned or normal size for room light  Anxiety or Irritability: None  Bone or joint aches: Not present  Gooseflesh skin: Skin is smooth  Running Nose or tearing: Not present  Clinical Opiate Withdrawal Scale Score: 1       PHYSICAL EXAM    (up to 7 for level 4, 8 or more for level 5)     ED Triage Vitals [11/27/20 1829]   BP Temp Temp Source Pulse Resp SpO2 Height Weight   (!) 187/78 98.2 °F (36.8 °C) Oral 86 20 95 % 5' 4\" (1.626 m) 190 lb (86.2 kg)       Physical Exam    General appearance:  Cooperative. No acute distress. Skin:  Warm. Dry. Eye:  Extraocular movements intact. Ears, nose, mouth and throat:  Oral mucosa moist,  Neck:  Trachea midline. Heart:  Regular rate and rhythm  Perfusion:  intact  Respiratory:  Lungs clear to auscultation bilaterally. Respirations nonlabored. Abdominal:   Non distended. Nontender  Rectal exam: Small nonthrombosed external hemorrhoids not currently bleeding. Rectal exam is notable for no stool in the vault however there is bright red blood. There is a 3 to 4 cm palpable firm mass in the anterior superior rectum. Neurological:  Alert and oriented x 3. Moves all extremities spontaneously  Musculoskeletal:   Normal ROM, no deformities          Psychiatric:  Normal mood      DIAGNOSTIC RESULTS       Labs Reviewed   COMPREHENSIVE METABOLIC PANEL W/ REFLEX TO MG FOR LOW K - Abnormal; Notable for the following components:       Result Value    Glucose 229 (*)     GFR Non- 60 (*)     All other components within normal limits    Narrative:     Performed at:  [facility]. [facility]  [address] [place] [address]. [employer]   Phone ( - Abnormal; Notable for the following components:    Protime 44.0 (*)     INR 3.95 (*)     All other components within normal limits    Narrative:     Performed at:  [facility]. [facility]  [facility],  [facility]  [employer]   Phone [phone]   POCT GLUCOSE - Abnormal; Notable for the following components:    POC Glucose 258 (*)     All other components within normal limits    Narrative:     Performed at:  [facility],  [place], [facility]   Phone [phone]   CBC WITH AUTO DIFFERENTIAL    Narrative:     Performed at:  [facility]. [facility]  [address] [facility],  [place] [address]. [facility], [address] [facility]   Phone [phone]   TSH WITH REFLEX    Narrative:     Performed at:  [facility]) [facility],  [place], [facility]   Phone [phone]    Narrative:     Performed at:  [facility],  [place], [facility]   Phone [phone]   URINE RT REFLEX TO CULTURE   IRON AND TIBC   HEMOGLOBIN AND HEMATOCRIT, BLOOD   HEMOGLOBIN AND HEMATOCRIT, BLOOD   HEMOGLOBIN A1C   COMPREHENSIVE METABOLIC PANEL W/ REFLEX TO MG FOR LOW K   CBC WITH AUTO DIFFERENTIAL   POCT GLUCOSE   POCT GLUCOSE   POCT GLUCOSE   TYPE AND SCREEN    Narrative:     Performed at:  [facility],  [place], [facility]   Phone [phone]       Interpretation per the Radiologist below, if obtained/available at the time of this note:    CT ABDOMEN PELVIS WO CONTRAST Additional Contrast? None   Final Result   1. Suboptimal exam performed without IV or oral contrast for evaluation of   rectal bleed. 2. Prominent, ill-defined soft tissue fullness at the mid to distal sigmoid   colon possibly related to mass lesion or infectious/inflammatory process. 3. Multifocal hepatic lesions typical of metastatic disease, suboptimally   evaluated without IV contrast.   4. Left-sided pelvic lymph nodes do not meet CT size criteria for adenopathy   better suspicious for metastatic disease. 5. Pulmonary nodules highly suspicious for metastatic disease. 6.  Diverticulosis coli without CT evidence of acute diverticulitis.              All other labs/imaging were within normal range or not returned as of this dictation. EMERGENCY DEPARTMENT COURSE and DIFFERENTIAL DIAGNOSIS/MDM:   Vitals:    Vitals:    11/27/20 2111 11/27/20 2200 11/27/20 2220 11/27/20 2330   BP: (!) 208/105 (!) 158/82 (!) 184/84 (!) 157/75   Pulse: 85 91 84 87   Resp: 16 20 21 (!) 51   Temp:    98.7 °F (37.1 °C)   TempSrc:    Oral   SpO2: 94% 95% 95% 97%   Weight:    190 lb (86.2 kg)   Height:    5' 4\" (1.626 m)       Patient presented to the emergency department today with rectal bleeding. Hemoglobin stable. Labs otherwise unremarkable aside from an supratherapeutic INR of 3.9. She did have some bright red rectal bleeding but had no bloody bowel movements here. Exam certainly concerning for a large palpable firm mass. Given this and her general complaints a CT scan was performed concerning for mass with metastatic disease. Patient and  informed of concerning findings for malignancy. Vital signs are stable. Hemoglobin stable. Did not feel the need to emergently reverse the patient's coagulopathy but will transfer her to [facility] for further work-up. EKG: Sinus rhythm PACs rate of 86 bpm.  Right bundle branch block with inferior Q waves. Anterior Q waves also present with flipped T waves throughout the anterior leads. No ST elevation. No prior. MDM    CONSULTS     IP CONSULT TO HOSPITALIST  IP CONSULT TO GI  IP CONSULT TO GENERAL SURGERY    Critical Care:   None    REASSESSMENT          PROCEDURE     Unless otherwise noted below, none     Procedures      FINAL IMPRESSION      1. Rectal bleeding    2. Abdominal mass, unspecified abdominal location            DISPOSITION/PLAN   DISPOSITION Admitted 11/27/2020 09:09:22 PM        PATIENT REFERRED TO:  No follow-up provider specified.     DISCHARGE MEDICATIONS:  Current Discharge Medication List        Controlled Substances Monitoring:     RX Monitoring 6/18/2016   Attestation The Prescription Monitoring Report for this patient was reviewed today. Periodic Controlled Substance Monitoring Possible medication side effects, risk of tolerance and/or dependence, and alternative treatments discussed; No signs of potential drug abuse or diversion identified.        (Please note that portions of this note were completed with a voice recognition program.  Efforts were made to edit the dictations but occasionally words are mis-transcribed.)    Niki Valles MD (electronically signed)  Attending Emergency Physician            Erika Mittal MD  11/28/20 0007

## 2020-11-28 NOTE — PROGRESS NOTES
Arrived on unit to room 212 from [place]. [facility] at [phone] 11/27/20. Admission assessment completed Patient alert and oriented x4 by stretcher. Moderate assist with left side deficit from previous CVA. Uses walker at home. Lungs clear on room air. [facility] here on admission after Labetalol given at [facility]. No pain reported. Patient is NPO d/t bright red rectal bleeding which is reason for admission. Skin is intact with only red excoriation between breasts. Patient is using BSC with one assist. Stool was watery and clotted. Lab at bedside. No needs voiced by patient at this time. Continue to monitor.

## 2020-11-28 NOTE — PROGRESS NOTES
Final assessment completed. Patient awake, alert and oriented x4 in bed. Patient had one bowel movement, clotted, medium loose. Patient is in no apparent distress, no reports of pain.

## 2020-11-28 NOTE — H&P
Hospital Medicine History & Physical      PCP: Duke Gomez, APRN - CNP    Date of Service: Pt seen/examined on 11/28/20 and admitted on 11/28/20 to Inpatient    Chief Complaint   Patient presents with    Rectal Bleeding     c/o bright red rectal bleeding x 4 months. On coumadin        History Of Present Illness: The patient is a 79 y.o. female with PMH below, presents with BRBPR, lower abd pain. Pt reports numerous intermittent small amounts of BRBPR over the last several months. She notes recently that these have increased in qty and frequency. IT is mainly associated w/ BM's and not so much in between. She has had intermittent mild to moderate lower abd pain. She denies any pain at this time. Other than for the blood, pt reports BM's normal.     Past Medical History:        Diagnosis Date    Atrial fibrillation ([address]     Cancer (HCC)     Cerebral artery occlusion with cerebral infarction ([address]     left sided weakness-left arm and left leg, also left eye    COVID-19     Diabetes mellitus ([address]     Fractures     Hypertension     Thyroid disease        Past Surgical History:        Procedure Laterality Date    ANKLE FRACTURE SURGERY  2019    left ankle    ANKLE SURGERY Left 6/17/2014    Removal painful hardware    CHOLECYSTECTOMY      CYST REMOVAL      chest x2, back x1    HAND SURGERY      right    HAND SURGERY  2/21/12    DUPUYTRENS RELEASE LEFT MIDDLE AND RING FINGERS INCLUDING    HYSTERECTOMY      THYROIDECTOMY         Medications Prior to Admission:    Prior to Admission medications    Medication Sig Start Date End Date Taking?  Authorizing Provider   amLODIPine (NORVASC) 5 MG tablet Take 1 tablet by mouth daily 10/27/20  Yes Stu Jimenez MD   b complex vitamins capsule Take 1 capsule by mouth daily   Yes Historical Provider, MD   nitroGLYCERIN (NITROSTAT) 0.4 MG SL tablet Place 1 tablet under the tongue every 5 minutes as needed for Chest pain 9/25/20  Yes ANANT Swenson CNP   warfarin (COUMADIN) 7.5 MG tablet Take 7.5 mg by mouth Tues-Thur-Sat   Yes Historical Provider, MD   insulin regular (HUMULIN R;NOVOLIN R) 100 UNIT/ML injection Inject 44-46 Units into the skin Daily with supper   Yes Historical Provider, MD   insulin 70-30 (HUMULIN 70/30) (70-30) 100 UNIT per ML injection vial Inject 44-46 Units into the skin 2 times daily Morning and bedtime   Yes Historical Provider, MD   nystatin (MYCOSTATIN) 848148 UNIT/GM cream  2/10/20  Yes Historical Provider, MD   levothyroxine (SYNTHROID) 112 MCG tablet Take 112 mcg by mouth Daily  2/26/20  Yes Historical Provider, MD   rosuvastatin (CRESTOR) 10 MG tablet Take 1 tablet by mouth daily 7/25/18  Yes ANANT Rodriguez CNP   lisinopril (PRINIVIL;ZESTRIL) 40 MG tablet Take 40 mg by mouth daily   Yes Historical Provider, MD   magnesium (MAGNESIUM-OXIDE) 250 MG TABS tablet Take 250 mg by mouth daily 3x/week   Yes Historical Provider, MD   Omega-3 Fatty Acids (FISH OIL) 1200 MG CAPS Take 1 capsule by mouth 2 times daily    Yes Historical Provider, MD   vitamin B-12 (CYANOCOBALAMIN) 1000 MCG tablet Take 1,000 mcg by mouth daily 3x/week   Yes Historical Provider, MD   Continuous Blood Gluc Sensor (FREESTYLE ASHELY 14 DAY SENSOR) MISC Use for continuous blood glucose testing. Change every 14 days 11/9/20   Antonio Mcknight MD   Insulin Syringe-Needle U-100 (INSULIN SYRINGE 1CC/30GX5/16\") 30G X 5/16\" 1 ML MISC 1 each by Does not apply route 3 times daily 5/19/20   ANANT Lindsay CNP       Allergies:  Cephalexin; Contrast [iodides]; and Barium-containing compounds    Social History:    TOBACCO:   reports that she has never smoked. She has never used smokeless tobacco.  ETOH:   reports no history of alcohol use. Family History:  Reviewed in detail and negative for DM, Early CAD, Cancer (except as below).  Positive as follows:        Problem Relation Age of Onset    Cancer Mother     Diabetes Mother    Angy Reeves Cancer Brother     High Blood Pressure Brother     Depression Maternal Aunt     Heart Disease Brother     Diabetes Brother     High Blood Pressure Brother     Heart Disease Brother     Diabetes Brother     High Blood Pressure Brother        REVIEW OF SYSTEMS:   Pertinent positives/negatives as follows: BRBPR, lower abd pain, and as discussed in HPI, otherwise a complete ROS performed and all other systems are negative  PHYSICAL EXAM PERFORMED:    BP (!) 159/93   Pulse 84   Temp 98.2 °F (36.8 °C) (Oral)   Resp 20   Ht 5' 4\" (1.626 m)   Wt 190 lb (86.2 kg)   SpO2 94%   BMI 32.61 kg/m²     GEN:  A&Ox3, NAD. HEENT:  NC/AT,EOMI, MMM, no erythema/exudates or visible masses. CVS:  Normal S1,S2. RRR. Without M/G/R.   LUNG:   CTA-B. no wheezes, rales or rhonchi  ABD:  Soft, ND/NT, BS+ x4. Without G/R.  EXT: 2+ pulses, no c/c/e. Brisk cap refill. PSY:  Thought process intact, affect appropriate. ENEIDA:  CN III-XII intact, moves all 4 spontaneously, sensory grossly intact. SKIN: No rash or lesions on visible skin. Chart review shows recent radiographs:  Ct Abdomen Pelvis Wo Contrast Additional Contrast? None    Result Date: 11/27/2020  EXAMINATION: CT OF THE ABDOMEN AND PELVIS WITHOUT CONTRAST 11/27/2020 7:34 pm TECHNIQUE: CT of the abdomen and pelvis was performed without the administration of intravenous contrast. Multiplanar reformatted images are provided for review. Dose modulation, iterative reconstruction, and/or weight based adjustment of the mA/kV was utilized to reduce the radiation dose to as low as reasonably achievable. COMPARISON: None.  HISTORY: ORDERING SYSTEM PROVIDED HISTORY: rectal bleeding, lower rectal mass TECHNOLOGIST PROVIDED HISTORY: Reason for exam:-> rectal bleeding, lower rectal mass Additional Contrast?-> none Reason for Exam: rectal bleed Acuity: Acute Type of Exam: Initial FINDINGS: Lower Chest: Multifocal indeterminate 10-12 mm solid soft tissue pulmonary nodules in the evaluated without IV contrast. 4. Left-sided pelvic lymph nodes do not meet CT size criteria for adenopathy better suspicious for metastatic disease. 5. Pulmonary nodules highly suspicious for metastatic disease. 6.  Diverticulosis coli without CT evidence of acute diverticulitis. Xr Chest Portable    Result Date: 10/29/2020  EXAMINATION: ONE XRAY VIEW OF THE CHEST 10/29/2020 11:23 am COMPARISON: 10/24/2020 HISTORY: ORDERING SYSTEM PROVIDED HISTORY: SOB TECHNOLOGIST PROVIDED HISTORY: Reason for exam:->SOB Reason for Exam: Shortness of breath Acuity: Acute Type of Exam: Initial FINDINGS: Heart size is normal.  The pulmonary vasculature is moderately prominent. No acute airspace disease. No adenopathy or pleural effusion. Moderate pulmonary vascular congestion similar to the prior study. No pleural effusion. CBC:  Recent Labs     11/27/20  1839   WBC 8.1   HGB 14.2   HCT 43.8           RENAL  Recent Labs     11/27/20  1839      K 3.8      CO2 26   BUN 11   CREATININE 0.9   GLUCOSE 229*       Hemoglobin a1c:  Lab Results   Component Value Date    LABA1C 9.2 10/21/2020    LABA1C 8.5 05/20/2020    LABA1C 9.0 12/03/2019       LFT'S:  Recent Labs     11/27/20  1839   AST 17   ALT 18   BILITOT 0.3   ALKPHOS 88       COAG:  Recent Labs     11/27/20  1839   INR 3.95*       CARDIAC ENZYMES:  Lab Results   Component Value Date    PROBNP 42 10/21/2020    PROBNP 27 03/04/2020    PROBNP 36 94/99/4023     PHYSICIAN CERTIFICATION  I certify that Maryan Hayes is expected to be hospitalized for 2 midnights based on the following assessment and plan:    ASSESSMENT/PLAN:  BRBPR, hemoccult +, HGB currently 14.2, last was 13.2 on 11/2. Will plan to transfuse for HGB < 7.0 or large volume loss. Has appt with Dr. Patrick Husain. H&H q6h, GI c/s. Admit to 2w. Pelvic mass (sigmoid?) concerning for malignancy w/ apparent distant mets seen on imaging (lynphnodes, pulm and liver). GI and GS c/s.   Defer to DD for Onc c/s if needed. Abd pain, lower, intermittent, none currently. Supratherapeutic INR, 3.95. Hold Coumadin 2/2 #1. Vit K 10 mg sq. Hx atrial fib, hold home Coumadin but cont rest of home regimen. DM2, hold oral Rx, chk A1c, add low SSI q6h while NPO. Rapid COVID neg. DVT Prophylaxis: SCD  Diet: NPO  Code Status: Full Code   PT/OT Eval Status: Will order if needed and as patient condition allows  Dispo - Admit to inpatient 2w    Justina Sandoval MD    Thank you ANANT Dill CNP for the opportunity to be involved in this patient's care. If you have any questions or concerns please feel free to contact me via the [employer] at [phone]. This chart was generated using the [address] [place], [place] [address]  dictation system. I created this record but it may contain dictation errors given the limitations of this technology.

## 2020-11-28 NOTE — ED NOTES
Report called to [address] [place] [address] on 2W.       Estefany Warren RN  11/27/20 2700

## 2020-11-28 NOTE — ED NOTES
212.1 at [facility]; [employer] ETA 20mins     Angy Jackson  11/27/20 4399

## 2020-11-28 NOTE — CARE COORDINATION
Case Management Assessment  Initial Evaluation      Patient Name: Gisella Muñiz  YOB: 1940  Diagnosis: BRBPR (bright red blood per rectum) [K62.5]  Date / Time: 11/27/2020  6:21 PM    Admission status/Date:  11/27/2020  Chart Reviewed: Yes      Patient Interviewed:   Family Interviewed:  Yes - spouse Vega Catherine (Caregiver)      Hospitalization in the last 30 days:  Yes      Health Care Decision Maker :   Primary Decision Maker: Roger Garcia - Spouse - [phone]    Secondary Decision Maker: Prema Orta - Child - [phone]        Met with: spouse  Jerome Edison conducted  (bedside/phone): phone    Current PCP: [address] Medicare  Precert required for SNF : WAIVED          3 night stay required - NA    ADLS  Support Systems/Care Needs: Spouse/Significant Other, Children, Friends/Neighbors  Transportation: family    Meal Preparation: family    Housing  Living Arrangements: house w/sp (sp is pt's caregiver)  Steps: ramp  Intent for return to present living arrangements: Yes  Identified Issues: +24/7 supervision    Home Care Information  Active with [address] : No - recently discharged from [place] [address] (11/27) (SN/PT/OT)  Passport/Waiver : No          Durable Medical Equiptment   DME Provider: NA  Equipment:   Walker_X__Cane___RTS___ BSC___Shower Chair___Hospital Bed___W/C_electric scooter___Other________  02 at ____Liter(s)---wears(frequency)_______ HHN ___ CPAP___ BiPap___   N/A____      Home O2 Use :  No  97% RA        Community Service Affiliation  Dialysis:  No    · Agency:  · Location:  · Dialysis Schedule:  · Phone:   · Fax: Other Community Services: NA    DISCHARGE PLAN: Explained Case Management role/services. Chart reviewed. Met with pt 's spouse via phone and explained the role of the CM. Attempted to call pt in her room -phone rang busy. Plan: To return home in the care of spouse and family. +24/7 supervision. Denies any new HHC needs.  (Just DC'd from [facility] on 11/27). Await GI eval and recs. Per spouse, Lelo Garcia think she has some kind of mass\" for this reason he is unsure of DCP needs until after she knows a little more about her prognosis.  +CM will follow

## 2020-11-29 LAB
CEA: 665.3 NG/ML (ref 0–5)
GLUCOSE BLD-MCNC: 195 MG/DL (ref 70–99)
GLUCOSE BLD-MCNC: 220 MG/DL (ref 70–99)
GLUCOSE BLD-MCNC: 241 MG/DL (ref 70–99)
GLUCOSE BLD-MCNC: 253 MG/DL (ref 70–99)
HCT VFR BLD CALC: 39.1 % (ref 36–48)
HCT VFR BLD CALC: 41.1 % (ref 36–48)
HCT VFR BLD CALC: 42.3 % (ref 36–48)
HEMOGLOBIN: 12.9 G/DL (ref 12–16)
HEMOGLOBIN: 13.8 G/DL (ref 12–16)
HEMOGLOBIN: 14 G/DL (ref 12–16)
PERFORMED ON: ABNORMAL

## 2020-11-29 PROCEDURE — 6370000000 HC RX 637 (ALT 250 FOR IP): Performed by: INTERNAL MEDICINE

## 2020-11-29 PROCEDURE — 85018 HEMOGLOBIN: CPT

## 2020-11-29 PROCEDURE — 2580000003 HC RX 258: Performed by: INTERNAL MEDICINE

## 2020-11-29 PROCEDURE — 1200000000 HC SEMI PRIVATE

## 2020-11-29 PROCEDURE — 85014 HEMATOCRIT: CPT

## 2020-11-29 PROCEDURE — 36415 COLL VENOUS BLD VENIPUNCTURE: CPT

## 2020-11-29 PROCEDURE — G0378 HOSPITAL OBSERVATION PER HR: HCPCS

## 2020-11-29 RX ORDER — POLYETHYLENE GLYCOL 3350 17 G/17G
119 POWDER, FOR SOLUTION ORAL ONCE
Status: COMPLETED | OUTPATIENT
Start: 2020-11-29 | End: 2020-11-29

## 2020-11-29 RX ORDER — INSULIN GLARGINE 100 [IU]/ML
10 INJECTION, SOLUTION SUBCUTANEOUS NIGHTLY
Status: DISCONTINUED | OUTPATIENT
Start: 2020-11-29 | End: 2020-12-01 | Stop reason: HOSPADM

## 2020-11-29 RX ORDER — POLYETHYLENE GLYCOL 3350 17 G/17G
119 POWDER, FOR SOLUTION ORAL ONCE
Status: COMPLETED | OUTPATIENT
Start: 2020-11-30 | End: 2020-11-30

## 2020-11-29 RX ADMIN — BISACODYL 20 MG: 5 TABLET, COATED ORAL at 17:23

## 2020-11-29 RX ADMIN — Medication 119 G: at 22:19

## 2020-11-29 RX ADMIN — AMLODIPINE BESYLATE 5 MG: 5 TABLET ORAL at 08:16

## 2020-11-29 RX ADMIN — INSULIN LISPRO 4 UNITS: 100 INJECTION, SOLUTION INTRAVENOUS; SUBCUTANEOUS at 08:17

## 2020-11-29 RX ADMIN — INSULIN GLARGINE 10 UNITS: 100 INJECTION, SOLUTION SUBCUTANEOUS at 22:21

## 2020-11-29 RX ADMIN — LEVOTHYROXINE SODIUM 112 MCG: 112 TABLET ORAL at 05:59

## 2020-11-29 RX ADMIN — LISINOPRIL 40 MG: 20 TABLET ORAL at 08:16

## 2020-11-29 RX ADMIN — INSULIN LISPRO 2 UNITS: 100 INJECTION, SOLUTION INTRAVENOUS; SUBCUTANEOUS at 13:21

## 2020-11-29 RX ADMIN — Medication 10 ML: at 22:19

## 2020-11-29 RX ADMIN — ROSUVASTATIN CALCIUM 10 MG: 10 TABLET, FILM COATED ORAL at 08:16

## 2020-11-29 RX ADMIN — INSULIN LISPRO 4 UNITS: 100 INJECTION, SOLUTION INTRAVENOUS; SUBCUTANEOUS at 17:24

## 2020-11-29 RX ADMIN — CYANOCOBALAMIN TAB 500 MCG 1000 MCG: 500 TAB at 08:16

## 2020-11-29 RX ADMIN — B-COMPLEX W/ C & FOLIC ACID TAB 1 TABLET: TAB at 08:16

## 2020-11-29 ASSESSMENT — PAIN DESCRIPTION - ORIENTATION
ORIENTATION: MID;OUTER;UPPER

## 2020-11-29 ASSESSMENT — PAIN - FUNCTIONAL ASSESSMENT
PAIN_FUNCTIONAL_ASSESSMENT: ACTIVITIES ARE NOT PREVENTED

## 2020-11-29 ASSESSMENT — PAIN DESCRIPTION - PROGRESSION
CLINICAL_PROGRESSION: NOT CHANGED
CLINICAL_PROGRESSION: GRADUALLY WORSENING
CLINICAL_PROGRESSION: GRADUALLY WORSENING
CLINICAL_PROGRESSION: NOT CHANGED
CLINICAL_PROGRESSION: NOT CHANGED

## 2020-11-29 ASSESSMENT — PAIN SCALES - GENERAL
PAINLEVEL_OUTOF10: 0
PAINLEVEL_OUTOF10: 2
PAINLEVEL_OUTOF10: 0
PAINLEVEL_OUTOF10: 2
PAINLEVEL_OUTOF10: 2

## 2020-11-29 ASSESSMENT — PAIN DESCRIPTION - LOCATION
LOCATION: BACK

## 2020-11-29 ASSESSMENT — PAIN DESCRIPTION - PAIN TYPE
TYPE: CHRONIC PAIN

## 2020-11-29 NOTE — PROGRESS NOTES
Hospitalist Progress Note      PCP: ANANT Villagran - CNP    Date of Admission: 11/27/2020    Subjective: denies any more blood in stools, hb stable    Medications:  Reviewed    Infusion Medications    dextrose       Scheduled Medications    polyethylene glycol  119 g Oral Once    [START ON 11/30/2020] polyethylene glycol  119 g Oral Once    insulin lispro  0-12 Units Subcutaneous TID WC    insulin lispro  0-6 Units Subcutaneous Nightly    amLODIPine  5 mg Oral Daily    vitamin B complex w/C  1 tablet Oral Daily    levothyroxine  112 mcg Oral Daily    lisinopril  40 mg Oral Daily    rosuvastatin  10 mg Oral Daily    vitamin B-12  1,000 mcg Oral Daily    sodium chloride flush  10 mL Intravenous 2 times per day     PRN Meds: hydrALAZINE, nitroGLYCERIN, sodium chloride flush, acetaminophen **OR** acetaminophen, promethazine **OR** ondansetron, glucose, dextrose, glucagon (rDNA), dextrose      Intake/Output Summary (Last 24 hours) at 11/29/2020 1818  Last data filed at 11/29/2020 0209  Gross per 24 hour   Intake --   Output 500 ml   Net -500 ml       Physical Exam Performed:    BP (!) 157/81   Pulse 73   Temp 97.9 °F (36.6 °C) (Oral)   Resp 18   Ht 5' 4\" (1.626 m)   Wt 190 lb (86.2 kg)   SpO2 93%   BMI 32.61 kg/m²     GEN:        A&Ox3, NAD. HEENT:   NC/AT,EOMI, MMM, no erythema/exudates or visible masses. CVS:        Normal S1,S2. RRR. Without M/G/R.   LUNG:     CTA-B. no wheezes, rales or rhonchi  ABD:        Soft, ND/NT, BS+ x4. Without G/R.  EXT:        2+ pulses, no c/c/e. Brisk cap refill. PSY:        Thought process intact, affect appropriate. ENEIDA:        CN III-XII intact, moves all 4 spontaneously, sensory grossly intact.   SKIN:       No rash or lesions on visible skin    Labs:   Recent Labs     11/27/20  1839 11/28/20  0347  11/29/20  0339 11/29/20  0957 11/29/20  1537   WBC 8.1 7.5  --   --   --   --    HGB 14.2 12.7   < > 12.9 13.8 14.0   HCT 43.8 38.2   < > 39.1 41.1 42.3    221  --   --   --   --     < > = values in this interval not displayed. Recent Labs     11/27/20  1839 11/28/20  0347    137   K 3.8 3.7    105   CO2 26 24   BUN 11 10   CREATININE 0.9 0.6   CALCIUM 9.4 8.5     Recent Labs     11/27/20  1839 11/28/20  0347   AST 17 15   ALT 18 16   BILITOT 0.3 0.4   ALKPHOS 88 71     Recent Labs     11/27/20  1839   INR 3.95*     No results for input(s): Zoie Bridges in the last 72 hours. Urinalysis:      Lab Results   Component Value Date    NITRU Negative 10/24/2020    WBCUA 3-5 10/24/2020    BACTERIA Rare 10/24/2020    RBCUA 0-2 10/24/2020    BLOODU TRACE-INTACT 10/24/2020    SPECGRAV >=1.030 10/24/2020    GLUCOSEU >=1000 10/24/2020       Radiology:  CT ABDOMEN PELVIS WO CONTRAST Additional Contrast? None   Final Result   1. Suboptimal exam performed without IV or oral contrast for evaluation of   rectal bleed. 2. Prominent, ill-defined soft tissue fullness at the mid to distal sigmoid   colon possibly related to mass lesion or infectious/inflammatory process. 3. Multifocal hepatic lesions typical of metastatic disease, suboptimally   evaluated without IV contrast.   4. Left-sided pelvic lymph nodes do not meet CT size criteria for adenopathy   better suspicious for metastatic disease. 5. Pulmonary nodules highly suspicious for metastatic disease. 6.  Diverticulosis coli without CT evidence of acute diverticulitis. Assessment/Plan:    Active Hospital Problems    Diagnosis    BRBPR (bright red blood per rectum) [K62.5]         1. BRBPR, hemoccult +, HGB currently 14.2, last was 13.2 on 11/2. Will plan to transfuse for HGB < 7.0 or large volume loss, GI consulted. Plan colonoscopy in am    2. Pelvic mass (sigmoid?) concerning for malignancy w/ apparent distant mets seen on imaging (lynphnodes, pulm and liver). GI and GS c/s. Await colonoscopy in am, CEA elevated    3. Abd pain, lower, intermittent, none currently. 4. Supratherapeutic INR, 3.95. Holding Coumadin 2/2 #1. Vit K 10 mg sq. 5. Hx atrial fib, hold home Coumadin but cont rest of home regimen. 6. DM2, hold oral Rx, chk A1c 9.0, on SSI, add lantus small dose  7. HTN - uncontrolled, on lisinopril/amlodipine, prn IV hydralazine  8. Rapid COVID neg.        DVT Prophylaxis: SCD  Diet: DIET LOW FIBER;  Carb Control: 4 carb choices (60 gms)/meal  Code Status: Full Code    PT/OT Eval Status: ordered    Dispo - cont care, colonoscopy in am    Tata King MD

## 2020-11-29 NOTE — PROGRESS NOTES
5271 AM Assessment complete. Pt is awake in bed now. Doing better this am. HMG okay this am. No more bloody stools. Encouraged to stick to clears this am for colonoscopy tomorrow. Denies any needs this am. PO meds given see mar. BS elevated. The care plan and education has been reviewed and mutually agreed upon with the patient. Patient remains free from falls. All fall precautions in place. Yellow blanket at bedside, yellow bracelet on patient. SAFE sign on door. Bed and chair alarms being used. Bed in lowest position. Will monitor. 1300 up to [facility] with watery BM. No blood noted. Assisted back to bed safely,.   1600 consent on chart for colonoscopy tomorrow. Will start bowel prep tonight.

## 2020-11-29 NOTE — PROGRESS NOTES
Initial assessment completed. Patient alert and oriented x4 in bed. Room air, lungs clear. Vitals stable. Blood sugar was 240. Insulin coverage per MAR. Orders for low residue diet until midnight then clear liquids to prep for colonoscopy on Monday. Patient reports back pain at 3/10. Describes as aching. Tylenol given per MAR. No other needs voiced. Continue to monitor.

## 2020-11-29 NOTE — PROGRESS NOTES
PROGRESS NOTE  S:80 yrs Patient  admitted on 11/27/2020 with BRBPR (bright red blood per rectum) [K62.5] . Doing well. Planning colonsocopy tomorrow. Risks/benefits discussed    Current Hospital Schedued Meds   insulin lispro  0-12 Units Subcutaneous TID WC    insulin lispro  0-6 Units Subcutaneous Nightly    amLODIPine  5 mg Oral Daily    vitamin B complex w/C  1 tablet Oral Daily    levothyroxine  112 mcg Oral Daily    lisinopril  40 mg Oral Daily    rosuvastatin  10 mg Oral Daily    vitamin B-12  1,000 mcg Oral Daily    sodium chloride flush  10 mL Intravenous 2 times per day     Current Hospital IV Meds   dextrose       Current Hospital PRN Meds  hydrALAZINE, nitroGLYCERIN, sodium chloride flush, acetaminophen **OR** acetaminophen, promethazine **OR** ondansetron, glucose, dextrose, glucagon (rDNA), dextrose    Exam:   Vitals:    11/29/20 0807   BP: (!) 181/82   Pulse: 76   Resp: 18   Temp: 97.6 °F (36.4 °C)   SpO2: 92%     I/O last 3 completed shifts: In: 26 [P. O.:702]  Out: 500 [Urine:500]   General appearance: alert, appears stated age and cooperative  HEENT: PERRLA  Neck: no adenopathy, no carotid bruit, no JVD, supple, symmetrical, trachea midline and thyroid not enlarged, symmetric, no tenderness/mass/nodules  Lungs: clear to auscultation bilaterally  Heart: regular rate and rhythm, S1, S2 normal, no murmur, click, rub or gallop  Abdomen: soft, non-tender; bowel sounds normal; no masses,  no organomegaly  Extremities: extremities normal, atraumatic, no cyanosis or edema     Labs:  CBC:   Recent Labs     11/27/20  1839 11/28/20  0347  11/28/20  2145 11/29/20  0339 11/29/20  0957   WBC 8.1 7.5  --   --   --   --    HGB 14.2 12.7   < > 13.0 12.9 13.8   HCT 43.8 38.2   < > 39.3 39.1 41.1   MCV 89.6 88.6  --   --   --   --     221  --   --   --   --     < > = values in this interval not displayed.      BMP:   Recent Labs     11/27/20  1836 11/28/20  0347    137   K 3.8 3.7    105   CO2 26 24   BUN 11 10   CREATININE 0.9 0.6     LIVER PROFILE:   Recent Labs     11/27/20 1839 11/28/20  0347   AST 17 15   ALT 18 16   PROT 7.5 6.6   BILITOT 0.3 0.4   ALKPHOS 88 71     PT/INR:   Recent Labs     11/27/20  1839   INR 3.95*       IMAGING:  Ct Abdomen Pelvis Wo Contrast Additional Contrast? None    Result Date: 11/27/2020  EXAMINATION: CT OF THE ABDOMEN AND PELVIS WITHOUT CONTRAST 11/27/2020 7:34 pm TECHNIQUE: CT of the abdomen and pelvis was performed without the administration of intravenous contrast. Multiplanar reformatted images are provided for review. Dose modulation, iterative reconstruction, and/or weight based adjustment of the mA/kV was utilized to reduce the radiation dose to as low as reasonably achievable. COMPARISON: None. HISTORY: ORDERING SYSTEM PROVIDED HISTORY: rectal bleeding, lower rectal mass TECHNOLOGIST PROVIDED HISTORY: Reason for exam:-> rectal bleeding, lower rectal mass Additional Contrast?-> none Reason for Exam: rectal bleed Acuity: Acute Type of Exam: Initial FINDINGS: Lower Chest: Multifocal indeterminate 10-12 mm solid soft tissue pulmonary nodules in the visualized lower lungs concerning for metastatic disease. The visualized portions of the cardiac and posterior mediastinal structures appear unremarkable. Liver: Suboptimally evaluated without IV contrast.  Hypoattenuating hepatic mass lesions concerning for metastatic disease, representative lesions as seen on axial series 2: .  Image 18, hepatic dome, 1 x 1 cm . Image 28, segment IV, 2.2 x 2.2 cm . Image 46, segment III, 2.6 x 2.6 cm . Image 48, segment V, 1.2 x 1.2 cm Other organs: The kidneys, adrenal glands, pancreas and spleen appear unremarkable. Status post cholecystectomy. GI/Bowel:Prominent, ill-defined soft tissue fullness at the mid to distal sigmoid colon possibly related to mass lesion or infectious/inflammatory process.   Multifocal diverticula involve the descending and sigmoid colon without evidence of acute inflammatory change. Other portions of the colon, small bowel and stomach appear unremarkable. Pelvis: No focal ascites or pneumoperitoneum. Borderline enlarged left-sided pelvic lymph node axial series 2, image 140 is 8 x 15 mm, with another adjacent lymph node measuring 8 mm axial image 141, and a 3rd just superior to this measuring 9 x 13 mm; though these do not meet CT size criteria for adenopathy there is suspicious for metastatic disease partially distended urinary bladder appears unremarkable. Status post hysterectomy. Peritoneum/Retroperitoneum: Calcific atherosclerotic disease aorta. No aneurysm. Unremarkable appearance of the IVC. No adenopathy or fluid. Bones/Soft Tissues: No acute superficial soft tissue or osseous structure abnormality evident. 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 1. Suboptimal exam performed without IV or oral contrast for evaluation of rectal bleed. 2. Prominent, ill-defined soft tissue fullness at the mid to distal sigmoid colon possibly related to mass lesion or infectious/inflammatory process. 3. Multifocal hepatic lesions typical of metastatic disease, suboptimally evaluated without IV contrast. 4. Left-sided pelvic lymph nodes do not meet CT size criteria for adenopathy better suspicious for metastatic disease. 5. Pulmonary nodules highly suspicious for metastatic disease. 6.  Diverticulosis coli without CT evidence of acute diverticulitis. Hospital Problems           Last Modified POA    BRBPR (bright red blood per rectum) 11/27/2020 Yes         Impression:  80 yo female with brbpr and abnormal imaging    Recommendation:  1.  Plan colonoscopy tomorrow with Dr. Elaina Colbert, DO  1:38 PM 11/29/2020            [facility]    [address]      [address] [place] [address]     Phone: [phone]     Fax: [phone]

## 2020-11-29 NOTE — PROGRESS NOTES
Patient diet switched to clear liquids in preparation for colonoscopy on Monday. Sunday at midnight patient should be NPO.

## 2020-11-30 LAB
ANION GAP SERPL CALCULATED.3IONS-SCNC: 15 MMOL/L (ref 3–16)
BASOPHILS ABSOLUTE: 0.1 K/UL (ref 0–0.2)
BASOPHILS RELATIVE PERCENT: 1 %
BUN BLDV-MCNC: 14 MG/DL (ref 7–20)
CALCIUM SERPL-MCNC: 9.4 MG/DL (ref 8.3–10.6)
CHLORIDE BLD-SCNC: 98 MMOL/L (ref 99–110)
CO2: 17 MMOL/L (ref 21–32)
CREAT SERPL-MCNC: 0.6 MG/DL (ref 0.6–1.2)
EOSINOPHILS ABSOLUTE: 0.2 K/UL (ref 0–0.6)
EOSINOPHILS RELATIVE PERCENT: 2.4 %
GFR AFRICAN AMERICAN: >60
GFR NON-AFRICAN AMERICAN: >60
GLUCOSE BLD-MCNC: 214 MG/DL (ref 70–99)
GLUCOSE BLD-MCNC: 229 MG/DL (ref 70–99)
GLUCOSE BLD-MCNC: 236 MG/DL (ref 70–99)
GLUCOSE BLD-MCNC: 258 MG/DL (ref 70–99)
GLUCOSE BLD-MCNC: 271 MG/DL (ref 70–99)
GLUCOSE BLD-MCNC: 357 MG/DL (ref 70–99)
HCT VFR BLD CALC: 42.8 % (ref 36–48)
HEMOGLOBIN: 13.9 G/DL (ref 12–16)
INR BLD: 1.91 (ref 0.86–1.14)
LYMPHOCYTES ABSOLUTE: 1.7 K/UL (ref 1–5.1)
LYMPHOCYTES RELATIVE PERCENT: 19.8 %
MCH RBC QN AUTO: 29.8 PG (ref 26–34)
MCHC RBC AUTO-ENTMCNC: 32.4 G/DL (ref 31–36)
MCV RBC AUTO: 91.8 FL (ref 80–100)
MONOCYTES ABSOLUTE: 0.9 K/UL (ref 0–1.3)
MONOCYTES RELATIVE PERCENT: 10.1 %
NEUTROPHILS ABSOLUTE: 5.8 K/UL (ref 1.7–7.7)
NEUTROPHILS RELATIVE PERCENT: 66.7 %
PDW BLD-RTO: 14.6 % (ref 12.4–15.4)
PERFORMED ON: ABNORMAL
PLATELET # BLD: 217 K/UL (ref 135–450)
PMV BLD AUTO: 8.7 FL (ref 5–10.5)
POTASSIUM SERPL-SCNC: 4.1 MMOL/L (ref 3.5–5.1)
PROTHROMBIN TIME: 22.3 SEC (ref 10–13.2)
RBC # BLD: 4.66 M/UL (ref 4–5.2)
SARS-COV-2, PCR: DETECTED
SODIUM BLD-SCNC: 130 MMOL/L (ref 136–145)
WBC # BLD: 8.8 K/UL (ref 4–11)

## 2020-11-30 PROCEDURE — 2580000003 HC RX 258: Performed by: INTERNAL MEDICINE

## 2020-11-30 PROCEDURE — 7100000011 HC PHASE II RECOVERY - ADDTL 15 MIN: Performed by: INTERNAL MEDICINE

## 2020-11-30 PROCEDURE — 85025 COMPLETE CBC W/AUTO DIFF WBC: CPT

## 2020-11-30 PROCEDURE — 6370000000 HC RX 637 (ALT 250 FOR IP): Performed by: INTERNAL MEDICINE

## 2020-11-30 PROCEDURE — G0378 HOSPITAL OBSERVATION PER HR: HCPCS

## 2020-11-30 PROCEDURE — 99152 MOD SED SAME PHYS/QHP 5/>YRS: CPT | Performed by: INTERNAL MEDICINE

## 2020-11-30 PROCEDURE — 0DBL8ZX EXCISION OF TRANSVERSE COLON, VIA NATURAL OR ARTIFICIAL OPENING ENDOSCOPIC, DIAGNOSTIC: ICD-10-PCS | Performed by: INTERNAL MEDICINE

## 2020-11-30 PROCEDURE — 99232 SBSQ HOSP IP/OBS MODERATE 35: CPT | Performed by: INTERNAL MEDICINE

## 2020-11-30 PROCEDURE — 7100000010 HC PHASE II RECOVERY - FIRST 15 MIN: Performed by: INTERNAL MEDICINE

## 2020-11-30 PROCEDURE — 85610 PROTHROMBIN TIME: CPT

## 2020-11-30 PROCEDURE — 99153 MOD SED SAME PHYS/QHP EA: CPT | Performed by: INTERNAL MEDICINE

## 2020-11-30 PROCEDURE — 3609010300 HC COLONOSCOPY W/BIOPSY SINGLE/MULTIPLE: Performed by: INTERNAL MEDICINE

## 2020-11-30 PROCEDURE — 1200000000 HC SEMI PRIVATE

## 2020-11-30 PROCEDURE — 6360000002 HC RX W HCPCS: Performed by: INTERNAL MEDICINE

## 2020-11-30 PROCEDURE — 88305 TISSUE EXAM BY PATHOLOGIST: CPT

## 2020-11-30 PROCEDURE — 0DBP8ZX EXCISION OF RECTUM, VIA NATURAL OR ARTIFICIAL OPENING ENDOSCOPIC, DIAGNOSTIC: ICD-10-PCS | Performed by: INTERNAL MEDICINE

## 2020-11-30 PROCEDURE — 2709999900 HC NON-CHARGEABLE SUPPLY: Performed by: INTERNAL MEDICINE

## 2020-11-30 PROCEDURE — 36415 COLL VENOUS BLD VENIPUNCTURE: CPT

## 2020-11-30 PROCEDURE — 80048 BASIC METABOLIC PNL TOTAL CA: CPT

## 2020-11-30 RX ORDER — FENTANYL CITRATE 50 UG/ML
INJECTION, SOLUTION INTRAMUSCULAR; INTRAVENOUS PRN
Status: DISCONTINUED | OUTPATIENT
Start: 2020-11-30 | End: 2020-11-30 | Stop reason: ALTCHOICE

## 2020-11-30 RX ORDER — SODIUM CHLORIDE, SODIUM LACTATE, POTASSIUM CHLORIDE, CALCIUM CHLORIDE 600; 310; 30; 20 MG/100ML; MG/100ML; MG/100ML; MG/100ML
INJECTION, SOLUTION INTRAVENOUS CONTINUOUS PRN
Status: COMPLETED | OUTPATIENT
Start: 2020-11-30 | End: 2020-11-30

## 2020-11-30 RX ORDER — MIDAZOLAM HYDROCHLORIDE 5 MG/ML
INJECTION INTRAMUSCULAR; INTRAVENOUS PRN
Status: DISCONTINUED | OUTPATIENT
Start: 2020-11-30 | End: 2020-11-30 | Stop reason: ALTCHOICE

## 2020-11-30 RX ADMIN — LEVOTHYROXINE SODIUM 112 MCG: 112 TABLET ORAL at 05:00

## 2020-11-30 RX ADMIN — INSULIN GLARGINE 10 UNITS: 100 INJECTION, SOLUTION SUBCUTANEOUS at 22:03

## 2020-11-30 RX ADMIN — Medication 119 G: at 04:18

## 2020-11-30 RX ADMIN — INSULIN LISPRO 4 UNITS: 100 INJECTION, SOLUTION INTRAVENOUS; SUBCUTANEOUS at 18:08

## 2020-11-30 RX ADMIN — Medication 10 ML: at 22:02

## 2020-11-30 RX ADMIN — Medication 10 ML: at 08:13

## 2020-11-30 RX ADMIN — LISINOPRIL 40 MG: 20 TABLET ORAL at 08:12

## 2020-11-30 ASSESSMENT — PAIN SCALES - GENERAL
PAINLEVEL_OUTOF10: 0
PAINLEVEL_OUTOF10: 0

## 2020-11-30 NOTE — BRIEF OP NOTE
Brief Postoperative Note      Patient: Shay Perea  YOB: 1940  MRN: [id number]    Date of Procedure: 11/30/2020    Pre-Op Diagnosis: RECTAL BLEEDING    Post-Op Diagnosis: large (5cm) friable, nearly obstructing, bleeding rectal mass at 5 cm from anal verge. multiple benign appearing polyps in transverse colon not resected due to anticoagulated state       Procedure(s):  COLONOSCOPY WITH BIOPSY    Surgeon(s):  Kimberly Guido MD    Assistant:  * No surgical staff found *    Anesthesia: IV Sedation    Estimated Blood Loss (mL): Minimal    Complications: None    Specimens:   ID Type Source Tests Collected by Time Destination   A :  Tissue Tissue SURGICAL PATHOLOGY Kimberly Guido MD 11/30/2020 1343        Implants:  * No implants in log *      Drains:   External Urinary Catheter  (Active)   Urine Color Eli 11/29/20 2148   Suction- Female Only 40 mmgHg continuous 11/29/20 2148   Skin Assessment No Injury 11/29/20 2148       Findings: large (5cm) friable, nearly obstructing, bleeding rectal mass at 5 cm from anal verge. multiple benign appearing polyps in transverse colon not resected due to anticoagulated state    Recommendation  1. Continue supportive care  2. Hold anticoagulation  3. Monitor Hgb  4. Await pathology  5. May need diverting colostomy vs colonic stent  6.  Alternatively palliative care consult    Electronically signed by Kimberly Guido MD on 11/30/2020 at 4:20 PM

## 2020-11-30 NOTE — PLAN OF CARE
Problem: Falls - Risk of:  Goal: Will remain free from falls  Description: Will remain free from falls  11/30/2020 0957 by Anisa Lucero RN  Outcome: Ongoing  11/30/2020 0544 by Riana Barnard RN  Outcome: Ongoing     Problem: Falls - Risk of:  Goal: Absence of physical injury  Description: Absence of physical injury  11/30/2020 0544 by Riana Barnard RN  Outcome: Ongoing     Problem: Skin Integrity:  Goal: Will show no infection signs and symptoms  Description: Will show no infection signs and symptoms  11/30/2020 0957 by Anisa Lucero RN  Outcome: Ongoing  11/30/2020 0544 by Riana Barnard RN  Outcome: Ongoing     Problem: Skin Integrity:  Goal: Absence of new skin breakdown  Description: Absence of new skin breakdown  11/30/2020 0544 by Riana Barnard RN  Outcome: Ongoing     Problem: Discharge Planning:  Goal: Discharged to appropriate level of care  Description: Discharged to appropriate level of care  11/30/2020 0544 by Riana Barnard RN  Outcome: Ongoing

## 2020-11-30 NOTE — PLAN OF CARE
Problem: Falls - Risk of:  Goal: Will remain free from falls  Description: Will remain free from falls  Outcome: Ongoing  Goal: Absence of physical injury  Description: Absence of physical injury  Outcome: Ongoing     Problem: Skin Integrity:  Goal: Will show no infection signs and symptoms  Description: Will show no infection signs and symptoms  Outcome: Ongoing  Goal: Absence of new skin breakdown  Description: Absence of new skin breakdown  Outcome: Ongoing     Problem: Discharge Planning:  Goal: Discharged to appropriate level of care  Description: Discharged to appropriate level of care  Outcome: Ongoing     Problem:  Bowel Function - Altered:  Goal: Bowel elimination is within specified parameters  Description: Bowel elimination is within specified parameters  Outcome: Ongoing     Problem: Fluid Volume - Imbalance:  Goal: Will show no signs and symptoms of excessive bleeding  Description: Will show no signs and symptoms of excessive bleeding  Outcome: Ongoing  Goal: Absence of imbalanced fluid volume signs and symptoms  Description: Absence of imbalanced fluid volume signs and symptoms  Outcome: Ongoing

## 2020-11-30 NOTE — PROGRESS NOTES
Received back from endo. Alert and oriented. Denies pain or discomfort. Tele replaced. Diet orders resumed. Will continue to monitor.

## 2020-11-30 NOTE — PROGRESS NOTES
< > = values in this interval not displayed. Recent Labs     11/27/20  1839 11/28/20  0347 11/30/20  0710    137 130*   K 3.8 3.7 4.1    105 98*   CO2 26 24 17*   BUN 11 10 14   CREATININE 0.9 0.6 0.6   CALCIUM 9.4 8.5 9.4     Recent Labs     11/27/20  1839 11/28/20  0347   AST 17 15   ALT 18 16   BILITOT 0.3 0.4   ALKPHOS 88 71     Recent Labs     11/27/20  1839   INR 3.95*     No results for input(s): Brenda العراقي in the last 72 hours. Urinalysis:      Lab Results   Component Value Date    NITRU Negative 10/24/2020    WBCUA 3-5 10/24/2020    BACTERIA Rare 10/24/2020    RBCUA 0-2 10/24/2020    BLOODU TRACE-INTACT 10/24/2020    SPECGRAV >=1.030 10/24/2020    GLUCOSEU >=1000 10/24/2020       Radiology:  CT ABDOMEN PELVIS WO CONTRAST Additional Contrast? None   Final Result   1. Suboptimal exam performed without IV or oral contrast for evaluation of   rectal bleed. 2. Prominent, ill-defined soft tissue fullness at the mid to distal sigmoid   colon possibly related to mass lesion or infectious/inflammatory process. 3. Multifocal hepatic lesions typical of metastatic disease, suboptimally   evaluated without IV contrast.   4. Left-sided pelvic lymph nodes do not meet CT size criteria for adenopathy   better suspicious for metastatic disease. 5. Pulmonary nodules highly suspicious for metastatic disease. 6.  Diverticulosis coli without CT evidence of acute diverticulitis. Assessment/Plan:    Active Problems:    Hypothyroidism    Essential hypertension    Hyperlipidemia    Coronary artery disease involving native coronary artery of native heart without angina pectoris    BRBPR (bright red blood per rectum)  Resolved Problems:    * No resolved hospital problems. *         1. BRBPR, hemoccult +, HGB currently 14.2, last was 13.2 on 11/2. Will plan to transfuse for HGB < 7.0 or large volume loss, GI consulted. Plan colonoscopy today    2.  Pelvic mass (sigmoid?) concerning

## 2020-11-30 NOTE — H&P
nystatin (MYCOSTATIN) 882749 UNIT/GM cream  2/10/20  Yes Historical Provider, MD   rosuvastatin (CRESTOR) 10 MG tablet Take 1 tablet by mouth daily 7/25/18  Yes ANANT Zaragoza CNP   lisinopril (PRINIVIL;ZESTRIL) 40 MG tablet Take 40 mg by mouth daily   Yes Historical Provider, MD   magnesium (MAGNESIUM-OXIDE) 250 MG TABS tablet Take 250 mg by mouth daily 3x/week   Yes Historical Provider, MD   Omega-3 Fatty Acids (FISH OIL) 1200 MG CAPS Take 1 capsule by mouth 2 times daily    Yes Historical Provider, MD   vitamin B-12 (CYANOCOBALAMIN) 1000 MCG tablet Take 1,000 mcg by mouth daily 3x/week   Yes Historical Provider, MD   nitroGLYCERIN (NITROSTAT) 0.4 MG SL tablet Place 1 tablet under the tongue every 5 minutes as needed for Chest pain 9/25/20   ANANT Trent - CNP        Allergies: Allergies   Allergen Reactions    Cephalexin     Contrast [Iodides] Shortness Of Breath    Barium-Containing Compounds        Nurses notes reviewed and agreed. Physical Exam:  Vital Signs: BP (!) 166/76   Pulse 85   Temp 97.8 °F (36.6 °C)   Resp 18   Ht 5' 4\" (1.626 m)   Wt 190 lb (86.2 kg)   SpO2 94%   BMI 32.61 kg/m²    Airway: Mallampati: II (soft palate, uvula, fauces visible)  Pulmonary:Normal  Cardiac:Normal  Abdomen:Normal    Pre-Procedure Assessment / Plan:  ASA: Class 3 - A patient with severe systemic disease that limits activity but is not incapacitating  Level of Sedation Plan: Moderate sedation  Post Procedure plan: Return to same level of care    I assessed the patient and find that the patient is in satisfactory condition to proceed with the planned procedure and sedation plan. I have explained the risk, benefits, and alternatives to the procedure; the patient understands and agrees to proceed.        Jonana Song  11/30/2020

## 2020-11-30 NOTE — PROGRESS NOTES
Awake, in bed. Npo for pending colonoscopy. Per pt her stool is clear post doing prep. She states no further bleeding has been noticed. Asked her to not flush next trip to  so I could see it. Pt verbalized understanding. BP meds given with sip of H2o.

## 2020-12-01 VITALS
HEART RATE: 72 BPM | DIASTOLIC BLOOD PRESSURE: 73 MMHG | BODY MASS INDEX: 32.44 KG/M2 | OXYGEN SATURATION: 93 % | TEMPERATURE: 97.2 F | SYSTOLIC BLOOD PRESSURE: 150 MMHG | RESPIRATION RATE: 16 BRPM | WEIGHT: 190 LBS | HEIGHT: 64 IN

## 2020-12-01 LAB
ANION GAP SERPL CALCULATED.3IONS-SCNC: 12 MMOL/L (ref 3–16)
BUN BLDV-MCNC: 20 MG/DL (ref 7–20)
CALCIUM SERPL-MCNC: 9.6 MG/DL (ref 8.3–10.6)
CHLORIDE BLD-SCNC: 102 MMOL/L (ref 99–110)
CO2: 22 MMOL/L (ref 21–32)
CREAT SERPL-MCNC: 0.6 MG/DL (ref 0.6–1.2)
GFR AFRICAN AMERICAN: >60
GFR NON-AFRICAN AMERICAN: >60
GLUCOSE BLD-MCNC: 243 MG/DL (ref 70–99)
GLUCOSE BLD-MCNC: 259 MG/DL (ref 70–99)
GLUCOSE BLD-MCNC: 267 MG/DL (ref 70–99)
GLUCOSE BLD-MCNC: 310 MG/DL (ref 70–99)
HCT VFR BLD CALC: 42 % (ref 36–48)
HEMOGLOBIN: 13.9 G/DL (ref 12–16)
INR BLD: 1.54 (ref 0.86–1.14)
MCH RBC QN AUTO: 29.6 PG (ref 26–34)
MCHC RBC AUTO-ENTMCNC: 33.2 G/DL (ref 31–36)
MCV RBC AUTO: 89.3 FL (ref 80–100)
PDW BLD-RTO: 14.1 % (ref 12.4–15.4)
PERFORMED ON: ABNORMAL
PLATELET # BLD: 214 K/UL (ref 135–450)
PMV BLD AUTO: 8.6 FL (ref 5–10.5)
POTASSIUM SERPL-SCNC: 3.7 MMOL/L (ref 3.5–5.1)
PROTHROMBIN TIME: 18 SEC (ref 10–13.2)
RBC # BLD: 4.71 M/UL (ref 4–5.2)
SODIUM BLD-SCNC: 136 MMOL/L (ref 136–145)
WBC # BLD: 8.1 K/UL (ref 4–11)

## 2020-12-01 PROCEDURE — 6370000000 HC RX 637 (ALT 250 FOR IP): Performed by: INTERNAL MEDICINE

## 2020-12-01 PROCEDURE — 36415 COLL VENOUS BLD VENIPUNCTURE: CPT

## 2020-12-01 PROCEDURE — 99232 SBSQ HOSP IP/OBS MODERATE 35: CPT | Performed by: NURSE PRACTITIONER

## 2020-12-01 PROCEDURE — 99232 SBSQ HOSP IP/OBS MODERATE 35: CPT | Performed by: SURGERY

## 2020-12-01 PROCEDURE — 80048 BASIC METABOLIC PNL TOTAL CA: CPT

## 2020-12-01 PROCEDURE — 99239 HOSP IP/OBS DSCHRG MGMT >30: CPT | Performed by: INTERNAL MEDICINE

## 2020-12-01 PROCEDURE — 85027 COMPLETE CBC AUTOMATED: CPT

## 2020-12-01 PROCEDURE — 85610 PROTHROMBIN TIME: CPT

## 2020-12-01 PROCEDURE — G0378 HOSPITAL OBSERVATION PER HR: HCPCS

## 2020-12-01 RX ORDER — HYDRALAZINE HYDROCHLORIDE 25 MG/1
50 TABLET, FILM COATED ORAL 3 TIMES DAILY
Qty: 90 TABLET | Refills: 3 | Status: SHIPPED | OUTPATIENT
Start: 2020-12-01 | End: 2021-01-19

## 2020-12-01 RX ORDER — LEVOTHYROXINE SODIUM 112 UG/1
112 TABLET ORAL DAILY
Qty: 30 TABLET | Refills: 3 | Status: SHIPPED | OUTPATIENT
Start: 2020-12-01

## 2020-12-01 RX ORDER — AMLODIPINE BESYLATE 5 MG/1
5 TABLET ORAL DAILY
Qty: 30 TABLET | Refills: 2 | Status: SHIPPED | OUTPATIENT
Start: 2020-12-01

## 2020-12-01 RX ADMIN — B-COMPLEX W/ C & FOLIC ACID TAB 1 TABLET: TAB at 11:38

## 2020-12-01 RX ADMIN — LEVOTHYROXINE SODIUM 112 MCG: 112 TABLET ORAL at 06:08

## 2020-12-01 RX ADMIN — ROSUVASTATIN CALCIUM 10 MG: 10 TABLET, FILM COATED ORAL at 11:38

## 2020-12-01 RX ADMIN — LISINOPRIL 40 MG: 20 TABLET ORAL at 11:37

## 2020-12-01 RX ADMIN — CYANOCOBALAMIN TAB 500 MCG 1000 MCG: 500 TAB at 11:38

## 2020-12-01 RX ADMIN — AMLODIPINE BESYLATE 5 MG: 5 TABLET ORAL at 11:38

## 2020-12-01 RX ADMIN — INSULIN LISPRO 8 UNITS: 100 INJECTION, SOLUTION INTRAVENOUS; SUBCUTANEOUS at 11:39

## 2020-12-01 NOTE — PROGRESS NOTES
General Surgery - [place], [place]  Daily Progress Note    Pt Name: Soni Dhaliwal Record Number: [id number]  Date of Birth 1940   Today's Date: 12/1/2020    ASSESSMENT  1. Colonoscopy yesterday: large 5 cm rectal mass with multiple benign polyps  2. ABD: soft, no tenderness, no N/V, + flatus, + Bms  3. H&H stable  4. CT with liver lesions of unknown primary, ? Sigmoid mass, ? metastatic pulmonary nodules. 5. VSS  6. COVID positive. PLAN  1. Plan would be to further evaluate liver lesions and await rectal mass biopsy. Pt can f/u with Dr. Teresa Andrade in office once oncology workup complete to discuss surgical options. Plan was discussed with the patient at the bedside and she agreed with the plan of care. Pt can be discharged home from a surgical standpoint. Mary Dowell has slightly improved from yesterday. Pain is well controlled. She has no nausea and no vomiting. She has passed flatus and has had a bowel movement. She is tolerating regular diet. Current activity is up with assistance    OBJECTIVE  VITALS:  height is 5' 4\" (1.626 m) and weight is 190 lb (86.2 kg). Her oral temperature is 97.2 °F (36.2 °C). Her blood pressure is 150/73 (abnormal) and her pulse is 72. Her respiration is 16 and oxygen saturation is 93%. VITALS:  BP (!) 150/73   Pulse 72   Temp 97.2 °F (36.2 °C) (Oral)   Resp 16   Ht 5' 4\" (1.626 m)   Wt 190 lb (86.2 kg)   SpO2 93%   BMI 32.61 kg/m²   INTAKE/OUTPUT:    Intake/Output Summary (Last 24 hours) at 12/1/2020 1619  Last data filed at 12/1/2020 0850  Gross per 24 hour   Intake 250 ml   Output 500 ml   Net -250 ml     GENERAL: alert, cooperative, no distress    I/O last 3 completed shifts: In: 250 [P.O.:240; I.V.:10]  Out: 500 [Urine:500]  No intake/output data recorded.     LABS  Recent Labs     12/01/20  0658   WBC 8.1   HGB 13.9   HCT 42.0         K 3.7      CO2 22   BUN 20   CREATININE 0.6   CALCIUM 9.6   INR 1.54*     CBC with Differential:    Lab Results   Component Value Date    WBC 8.1 12/01/2020    RBC 4.71 12/01/2020    HGB 13.9 12/01/2020    HCT 42.0 12/01/2020     12/01/2020    MCV 89.3 12/01/2020    MCH 29.6 12/01/2020    MCHC 33.2 12/01/2020    RDW 14.1 12/01/2020    SEGSPCT 59.9 12/19/2012    LYMPHOPCT 19.8 11/30/2020    MONOPCT 10.1 11/30/2020    BASOPCT 1.0 11/30/2020    MONOSABS 0.9 11/30/2020    LYMPHSABS 1.7 11/30/2020    EOSABS 0.2 11/30/2020    BASOSABS 0.1 11/30/2020    DIFFTYPE Auto 12/19/2012     CMP:    Lab Results   Component Value Date     12/01/2020    K 3.7 12/01/2020    K 3.7 11/28/2020     12/01/2020    CO2 22 12/01/2020    BUN 20 12/01/2020    CREATININE 0.6 12/01/2020    GFRAA >60 12/01/2020    GFRAA >60 03/20/2013    AGRATIO 1.3 11/28/2020    LABGLOM >60 12/01/2020    GLUCOSE 267 12/01/2020    PROT 6.6 11/28/2020    PROT 7.4 03/20/2013    LABALBU 3.7 11/28/2020    CALCIUM 9.6 12/01/2020    BILITOT 0.4 11/28/2020    ALKPHOS 71 11/28/2020    AST 15 11/28/2020    ALT 16 11/28/2020         Eli Rodríguez [employer]  Electronically signed 12/1/2020 at 4:14 PM

## 2020-12-01 NOTE — PROGRESS NOTES
Physician Progress Note      Sonia Murray  CSN #:                  [id number]  :                       1940  ADMIT DATE:       2020 6:21 PM  [address] [place] DATE:  RESPONDING  PROVIDER #:        [id number] MD          QUERY TEXT:    Patient admitted with GIB. Noted initial testing negative for COVID-19 on    and then then PCR-detected positive . If possible, please document in   the progress notes and discharge summary if COVID-19 was: The medical record reflects the following:  Risk Factors: advanced age, dm  Clinical Indicators: Rapid COVID negative - PCR positive  Treatment: droplet plus isolation    Thank you for your assistance,  Yanni Cisneros RN,BSN,CCDS,CRCR  Options provided:  -- Asymptomatic COVID-19 confirmed after study with positive PCR  -- COVID-19 ruled out after study  -- Other - I will add my own diagnosis  -- Disagree - Not applicable / Not valid  -- Disagree - Clinically unable to determine / Unknown  -- Refer to Clinical Documentation Reviewer    PROVIDER RESPONSE TEXT:    Asymptomatic COVID-19 confirmed after study with positive PCR.     Query created by: Rayo Dominguez on 2020 3:26 PM      Electronically signed by:  Jaclyn Farmer MD 2020 2:41 PM

## 2020-12-01 NOTE — PROGRESS NOTES
PROGRESS NOTE  S:80 yrs Patient  admitted on 11/27/2020 with BRBPR (bright red blood per rectum) [K62.5] . Today she complains of periumbilical abdominal pain with passing BMs. She passed one episode of hematochezia following colonoscopy yesterday. She states she had the rectal bleeding for the past 3-4 months. Exam:   Vitals:    12/01/20 0850   BP: (!) 150/73   Pulse: 72   Resp: 16   Temp: 97.2 °F (36.2 °C)   SpO2: 93%     Due to the current efforts to prevent transmission of COVID-19 and also the need to preserve PPE for other caregivers, a face-to-face encounter with the patient was not performed. That being said, all relevant records and diagnostic tests were reviewed, including laboratory results and imaging. Please reference any relevant documentation elsewhere. Care will be coordinated with the primary service. Medications: Reviewed    Labs:  CBC:   Recent Labs     11/29/20  1537 11/30/20  0710 12/01/20  0658   WBC  --  8.8 8.1   HGB 14.0 13.9 13.9   HCT 42.3 42.8 42.0   MCV  --  91.8 89.3   PLT  --  217 214     BMP:   Recent Labs     11/30/20  0710 12/01/20  0658   * 136   K 4.1 3.7   CL 98* 102   CO2 17* 22   BUN 14 20   CREATININE 0.6 0.6     LIVER PROFILE: No results for input(s): AST, ALT, LIPASE, PROT, BILIDIR, BILITOT, ALKPHOS in the last 72 hours. Invalid input(s): AMYLASE,  ALB  PT/INR:   Recent Labs     11/30/20  1153 12/01/20  0658   INR 1.91* 1.54*       Procedure(s): COLONOSCOPY WITH BIOPSY  Pre-Op Diagnosis: RECTAL BLEEDING  Post-Op Diagnosis: large (cm) friable, nearly obstructing, bleeding rectal mass at 5 cm from anal verge. multiple benign appearing polyps in transverse colon not resected due to anticoagulated state         IMAGING:  CT ABDOMEN PELVIS WO CONTRAST - 11/27/2020  Impression    1. Suboptimals exam performed without IV or oral contrast for evaluation of    rectal bleed.     2. Prominent, ill-defined soft tissue fullness at the mid to distal sigmoid    colon possibly related to mass lesion or infectious/inflammatory process. 3. Multifocal hepatic lesions typical of metastatic disease, suboptimally    evaluated without IV contrast.    4. Left-sided pelvic lymph nodes do not meet CT size criteria for adenopathy    better suspicious for metastatic disease. 5. Pulmonary nodules highly suspicious for metastatic disease. 6.  Diverticulosis coli without CT evidence of acute diverticulitis. Attending Supervising [de-identified] Attestation Statement  The patient is a 79 y.o. female. I have performed a history and physical examination of the patient. I discussed the case with my physician assistant Delia Plummer PA-C    I reviewed the patient's Past Medical History, Past Surgical History, Medications, and Allergies. Physical Exam:  Vitals:    11/30/20 2036 11/30/20 2145 12/01/20 0211 12/01/20 0850   BP: (!) 176/80 (!) 145/77 (!) 152/79 (!) 150/73   Pulse: 80 77 72 72   Resp: 18  16 16   Temp: 97 °F (36.1 °C)  97.6 °F (36.4 °C) 97.2 °F (36.2 °C)   TempSrc: Oral  Oral Oral   SpO2: 93%  95% 93%   Weight:       Height:           Physical Examination: General appearance -   Due to the current efforts to prevent transmission of COVID-19 and also the need to preserve PPE for other caregivers, a face-to-face encounter with the patient was not performed. That being said, all relevant records and diagnostic tests were reviewed, including laboratory results and imaging. Please reference any relevant documentation elsewhere. Care will be coordinated with the primary service. Impression: 79year old female with history of DM, HTN, Hypothyroidism, A fib, CVA, admitted with hematochezia. CT showed metastatic lesions of unknown primary. Colonoscopy showed large 5 cm rectal mass with multiple benign appearing polyps. Recommendation:  1. Continue supportive care  2. Monitor Hgb  3. Monitor and document output  4.  Hold anticoagulation  5. Await pathology  6. Continue diet as tolerated  7. Consider general surgery consult for diverting colostomy vs colonic stent  8. Alternatively, consider palliative care consult  9. Will follow      Becka Mcpherson PA-C  9:32 AM 12/1/2020                       79year old female with history of DM, HTN, Hypothyroidism, A fib, CVA, admitted with hematochezia. CT showed findings suspicous for metastatic liver lesions. Colonoscopy showed large 5 cm, obstructing rectal mass with multiple benign appearing polyps. Continue supportive care. Hold anticoagulation for 2wks. May need diverting colostomy vs colonic stent. Await pathology. Oncology consult. Will follow.     Jean Paulino MD          [phone]  [phone]

## 2020-12-01 NOTE — CARE COORDINATION
DISCHARGE ORDER  Date/Time 2020 4:16 PM  Completed by: Beverley Fothergill, Case Management    Patient Name: Pascual Caceres      : 1940  Admitting Diagnosis: BRBPR (bright red blood per rectum) [K62.5]      Admit order Date and Status:  Inpatient 20    Noted discharge order. If applicable PT/OT recommendation at Discharge:  n/a  DME recommendation by PT/OT: none  Confirmed discharge plan: Yes  with whom Calli Finney, daughter    Discharge Plan:  Patient is being discharged home today. Called patient in room and no answer. Called patient's spouse, no answer. Called patient's daughter, Lina Henderson and she is agreeable with discharge today and will call her father and let him know. Someone will come to take patient home. Reviewed chart. Role of discharge planner explained and patient verbalized understanding. Discharge order is noted. Has Home O2 in place on admit:  No  Informed of need to bring portable home O2 tank on day of discharge for nursing to connect prior to leaving:   Not Indicated  Verbalized agreement/Understanding:   Not Indicated  Pt is being d/c'd to home today. Pt's O2 sats are 93% on RA. Discharge timeout done with Missy. All discharge needs and concerns addressed.

## 2020-12-02 ENCOUNTER — CARE COORDINATION (OUTPATIENT)
Dept: CASE MANAGEMENT | Age: 80
End: 2020-12-02

## 2020-12-02 NOTE — CARE COORDINATION
[facility] Initial Outreach    Call within 2 business days of discharge: Yes    Patient: Marcel Grove Patient : 1940   MRN: [id number]  Discharge Date: 20   RARS: Readmission Risk Score: 15      Last Discharge [facility]       Complaint Diagnosis Description Type Department Provider    20 Rectal Bleeding Rectal bleeding . .. ED to Hosp-Admission (Discharged) (ADMITTED) [address] Eula Priest MD; Br... Spoke with: Sergio Camacho (spouse)    Non-face-to-face services provided:  Obtained and reviewed discharge summary and/or continuity of care documents  Education of patient/family/caregiver/guardian to support self-management-f/up with oncology; s/s to report  Assessment and support for treatment adherence and medication management-med review completed    Challenges to be reviewed by the provider   Additional needs identified to be addressed with provider No    COVID-19 Detected on 2020. Patient informed of results, if available? Yes       Method of communication with provider : none    Advance Care Planning:   Does patient have an Advance Directive:  decision maker updated. Was this a readmission? Yes  Patient stated reason for admission: GIB  Patients top risk factors for readmission: functional cognitive ability, functional physical ability and medical condition    Care Transition Nurse (CTN) contacted the caregiver by telephone to perform post hospital discharge assessment. Verified name and  with caregiver as identifiers. Provided introduction to self, and explanation of the CTN role. CTN reviewed discharge instructions, medical action plan and red flags with caregiver who verbalized understanding. Caregiver given an opportunity to ask questions and does not have any further questions or concerns at this time. Were discharge instructions available to patient? Yes.  Reviewed appropriate site of care based on symptoms and resources available to patient including: PCP and Specialist. The caregiver agrees to contact the PCP office for questions related to their healthcare. Medication reconciliation was performed with caregiver, who verbalizes understanding of administration of home medications. Covid Risk Education    Patient has following COVID-19 risk factors: diabetes and cancer. Education provided regarding infection prevention, and signs and symptoms of COVID-19 and when to seek medical attention with caregiver who verbalized understanding. Discussed exposure protocols and quarantine From CDC: Are you at higher risk for severe illness?   and given an opportunity for questions and concerns. The caregiver agrees to contact the COVID-19 hotline [phone] or PCP office for questions related to COVID-19. Symptoms reviewed with caregiver who verbalized the following symptoms: fatigue, no new symptoms and no worsening symptoms. Due to no new or worsening symptoms encounter was not routed to provider for escalation. Discussed follow-up appointments. If no appointment was previously scheduled, appointment scheduling offered: Yes. Is follow up appointment scheduled within 7 days of discharge? No  [facility] follow [facility] appointment(s): PCP called at end of call and spouse will schedule appt    Plan for follow-up call in 1-2 days based on severity of symptoms and risk factors. Plan for next call: follow up appointment    Spouse gave her half pill of warfarin last night, but he will stop the med as instructed. Reviewed new meds to [facility]. Has amlodipine 2.5mg at home and aware of dose of 5mg now. Has BP cuff at home and instructed to check 1-2 times daily. She is weak and tired today but otherwise okay. She did eat some breakfast this morning.  last night and he gave 42 units of 70/30 and this morning  for which he gave 36 units this morning. He plans to call for PCP appointment today.  His PCP office called and our call was ended so he could talk with them. CTN provided contact information for future needs.     Follow Up  Future Appointments   Date Time Provider [place] Kyle   1/4/2021  1:00 PM Toma Leyden [facility]   3/29/2021 10:00 AM MD Weston Escamilla [place] [facility]       Shaina Villarreal, RN  Care Transition Nurse  [phone] mobile

## 2020-12-02 NOTE — OP NOTE
[address]                 [address] [address]                                OPERATIVE REPORT    PATIENT NAME: Cassandra Martínez                       :        1940  MED REC NO:   [id number]                          ROOM:       0999  ACCOUNT NO:   [de-identified]                           ADMIT DATE: 2020  PROVIDER:     Tamara Pereira MD    DATE OF PROCEDURE:  2020    PRE-PROCEDURE DIAGNOSES:  1. Rectal bleeding. 2.  Abnormal imaging. PROCEDURE:  Colonoscopy to the cecum with biopsy. POSTPROCEDURE DIAGNOSES:  1.  A large 5-cm, friable bleeding near-obstructing rectal mass at 5 cm  from entry. 2.  Multiple proximal colon polyps, not resected due to underlying  anticoagulation. SURGEON:  Tamara Pereira MD    PROCEDURE INDICATIONS:  This is an 59-year-old female with history of  diabetes, hypertension, hypothyroidism, atrial fibrillation, CVA,  admitted with hematochezia. The patient was noted to have a  supratherapeutic INR with an INR of 4. Her anticoagulation has been  held. Further workup with imaging showed CT scan features of  ill-defined fullness in the distal colon concerning for mass lesion. There were also multiple hepatic lesions typical of metastatic disease  along with left-sided pelvic lymph nodes. She has never had a  colonoscopy. Her CEA was over 600. MEDICATIONS:  Versed 3 mg, fentanyl 25 mcg. PROCEDURE DETAILS:  Informed consent obtained after discussing risks,  benefits, and alternatives. Full history and physical was performed. The patient was classified as ASA class III. Medications were given to  achieve adequate sedation. The cardiopulmonary status was continuously  monitored throughout the procedure. The patient was placed in left  lateral decubitus position.   Once adequately sedated, a standard  pediatric colonoscope was inserted in the anus and advanced to the cecum  identified by the landmarks of the appendiceal orifice and IC valve. The entire mucosa of the cecum, ascending colon, transverse colon,  descending colon, sigmoid colon, and rectum were examined carefully  during withdrawal.  The patient tolerated the procedure well without any  difficulties. FINDINGS:  RECTUM:  The digital rectal exam was consistent with mass. COLON:  There was a large, obstructing, actively oozing ulcerative  friable mass at 5 cm. This mass spanned from 5-10 cm from entry. The  scope was able to traverse the stricture with moderate resistance. Biopsies were taken of the mass. There were several 5-7 mm  semi-pedunculated polyps in the ascending and transverse colon and none  of them were resected and retrieved due to the underlying  anticoagulation and the active bleeding from the rectum. The remaining  examined colon mucosa appeared normal without any obvious mass lesions  in the proximal colon. SUMMARY:  1.  A large 5-cm, nearly-obstructing, friable bleeding mass in the  rectum at 5 cm from entry. 2.  Multiple right colon polyps, not resected. 3.  Otherwise normal colon to the cecum. RECOMMENDATIONS:  1. Return the patient to floor for continuous medical care. 2.  Await the final pathology results. 3.  Hold anticoagulation for 2 weeks. 4.  The mass does have a potential for impending obstruction and thus I  recommend followup with surgery for a possible diverting colostomy. Alternatively, a colonic stent can be considered if the patient is a  poor surgical candidate. 5.  Oncology to follow further workup with PET-CT as outpatient. 6.  We will follow the patient with you. EBL: <5mL    Nalini Burgos MD    D: 12/02/2020 9:38:28       T: 12/02/2020 9:45:13     [email or web address]  Job#: [id number]     Doc#: [id number]    CC:  Javid Wei. MD Jake Quan Nicely.  MD Hany Newton MD Rolland Maier, CNP

## 2020-12-02 NOTE — DISCHARGE SUMMARY
Name:  Ramesh Gillette  Room:  0212/0212-01  MRN:    [id number]    Discharge Summary      This discharge summary is in conjunction with a complete physical exam done on the day of discharge. Attending Physician: Dr. Saira Ambrosio  Discharging Physician: Dr. Li Carrier: 11/27/2020  Discharge:  12/1/2020    HPI:  The patient is a 79 y.o. female with PMH below, presents with BRBPR, lower abd pain. Pt reports numerous intermittent small amounts of BRBPR over the last several months. She notes recently that these have increased in qty and frequency. IT is mainly associated w/ BM's and not so much in between. She has had intermittent mild to moderate lower abd pain. She denies any pain at this time. Other than for the blood, pt reports BM's normal.     Diagnoses this Admission and Hospital Course   1. BRBPR, hemoccult +, HGB currently 14.2, last was 13.2 on 11/2.  Will plan to transfuse for HGB < 7.0 or large volume loss, GI consulted. S/p colonoscopy with large mass that was bleeding. H/H stable. D/c home. Educated to hold coumadin. Return to ED for change in BM's or vomiting. F/u OP with Oncology. I personally spoke to the  and explained everything about the diagnosis and follow-up necessity. He verbalized understanding. The pathology report did show adenocarcinoma of the colon. I personally made an attempt to call the  and inform him of the diagnosis on 12/3/2020. I was able to speak to him and informed him of the diagnosis. He already has an appointment to see oncology on December 10, 2020     2. Pelvic mass (sigmoid?) concerning for malignancy w/ apparent distant mets seen on imaging (lynphnodes, pulm and liver).  GI and GS c/s. S/p colonoscopy with mass noted, CEA elevated. Further management per oncology. Outpatient appointment will be made by the .     3. Abd pain, lower, intermittent, none currently.    4.  Supratherapeutic INR, 3.95.  Holding Coumadin 2/2 #1.  Vit K 10 mg sq. Holding coumadin at d/c due to above. 5. Hx atrial fib, hold home Coumadin but cont'd rest of home regimen.   6. DM2, held oral Rx, chk A1c 9.0, on SSI, added lantus small dose  7. HTN - controlled, on lisinopril/amlodipine, prn IV hydralazine  8. Rapid COVID neg.     9. Asymptomatic COVID-19 confirmed after study with positive PCR.     DVT Prophylaxis: SCD      Procedures (Please Review Full Report for Details)  Colonoscopy with biopsy: large (5cm) friable, nearly obstructing, bleeding rectal mass at 5 cm from anal verge. multiple benign appearing polyps in transverse colon not resected due to anticoagulated state    Consults    General surgery  GI      Physical Exam at Discharge:    BP (!) 150/73   Pulse 72   Temp 97.2 °F (36.2 °C) (Oral)   Resp 16   Ht 5' 4\" (1.626 m)   Wt 190 lb (86.2 kg)   SpO2 93%   BMI 32.61 kg/m²     GEN:        A&Ox3, NAD. HEENT:   NC/AT,EOMI, MMM, no erythema/exudates or visible masses. CVS:        Normal S1,S2.  RRR. Without M/G/R.   LUNG:     CTA-B. no wheezes, rales or rhonchi  ABD:        Soft, ND/NT, BS+ x4.  Without G/R.  EXT:        2+ pulses, no c/c/e.  Brisk cap refill. PSY:        Thought process intact, affect appropriate. QRK:        VT III-XII intact, moves all 4 spontaneously, sensory grossly intact.   SKIN:       No rash or lesions on visible skin    CBC:   Recent Labs     11/29/20  1537 11/30/20  0710 12/01/20  0658   WBC  --  8.8 8.1   HGB 14.0 13.9 13.9   HCT 42.3 42.8 42.0   MCV  --  91.8 89.3   PLT  --  217 214     BMP:   Recent Labs     11/30/20  0710 12/01/20  0658   * 136   K 4.1 3.7   CL 98* 102   CO2 17* 22   BUN 14 20   CREATININE 0.6 0.6     PT/INR:   Recent Labs     11/30/20  1153 12/01/20  0658   PROTIME 22.3* 18.0*   INR 1.91* 1.54*       U/A:    Lab Results   Component Value Date    COLORU Yellow 10/24/2020    WBCUA 3-5 10/24/2020    RBCUA 0-2 10/24/2020    BACTERIA Rare 10/24/2020    CLARITYU Clear 10/24/2020    SPECGRAV >=1.030 10/24/2020    LEUKOCYTESUR Negative 10/24/2020    BLOODU TRACE-INTACT 10/24/2020    GLUCOSEU >=1000 10/24/2020    AMORPHOUS 1+ 10/22/2020       CULTURES  COVID 19 negative    RADIOLOGY  CT ABDOMEN PELVIS WO CONTRAST Additional Contrast? None   Final Result   1. Suboptimal exam performed without IV or oral contrast for evaluation of   rectal bleed. 2. Prominent, ill-defined soft tissue fullness at the mid to distal sigmoid   colon possibly related to mass lesion or infectious/inflammatory process. 3. Multifocal hepatic lesions typical of metastatic disease, suboptimally   evaluated without IV contrast.   4. Left-sided pelvic lymph nodes do not meet CT size criteria for adenopathy   better suspicious for metastatic disease. 5. Pulmonary nodules highly suspicious for metastatic disease. 6.  Diverticulosis coli without CT evidence of acute diverticulitis. Discharge Medications     Medication List      START taking these medications    hydrALAZINE 25 MG tablet  Commonly known as:  APRESOLINE  Take 2 tablets by mouth 3 times daily        CONTINUE taking these medications    amLODIPine 5 MG tablet  Commonly known as:  NORVASC  Take 1 tablet by mouth daily     b complex vitamins capsule     Fish Oil 1200 MG Caps     FreeStyle Gilma 14 Day Sensor Misc  Use for continuous blood glucose testing.  Change every 14 days     HumuLIN 70/30 (70-30) 100 UNIT per ML injection vial  Generic drug:  insulin 70-30     insulin regular 100 UNIT/ML injection  Commonly known as:  HUMULIN R;NOVOLIN R     INSULIN SYRINGE 1CC/30GX5/16\" 30G X 5/16\" 1 ML Misc  1 each by Does not apply route 3 times daily     levothyroxine 112 MCG tablet  Commonly known as:  SYNTHROID  Take 1 tablet by mouth Daily     lisinopril 40 MG tablet  Commonly known as:  PRINIVIL;ZESTRIL     magnesium 250 MG Tabs tablet  Commonly known as:  MAGNESIUM-OXIDE     nitroGLYCERIN 0.4 MG SL tablet  Commonly known as:  Nitrostat  Place 1 tablet under the tongue

## 2020-12-08 ENCOUNTER — CARE COORDINATION (OUTPATIENT)
Dept: CASE MANAGEMENT | Age: 80
End: 2020-12-08

## 2020-12-14 ENCOUNTER — CARE COORDINATION (OUTPATIENT)
Dept: CASE MANAGEMENT | Age: 80
End: 2020-12-14

## 2020-12-14 NOTE — CARE COORDINATION
[facility] Transitions Follow Up Call    2020    Patient: Matilde Wagner  Patient : 1940   MRN: [id number]  Reason for Admission:   Discharge Date: 20 RARS: Readmission Risk Score: 15         Spoke with: Yeyo 43 with patient who reported she is doing alright and feeling better. Patient reported she had apt with oncologist but still doesn't know what type of cancer she has, CTN encouraged patient to reach out to the oncologist to discuss cancer diagnosis. Patient reported she is scheduled to have a PET scan soon and she will reach out to the doctor to discuss treatment. Patient denied any further issues at this time. CTN advised patient of use of urgent care or physicians 24 hr access line if assistance is needed after hours. Care Transitions Subsequent and Final Call    Subsequent and Final Calls  Do you have any ongoing symptoms?: No  Have your medications changed?: No  Do you have any questions related to your medications?: No  Do you currently have any active services?: No  Do you have any needs or concerns that I can assist you with?: No  Identified Barriers: None  Care Transitions Interventions  Other Interventions:            Follow Up  Future Appointments   Date Time Provider Madison Kyle   2021  1:00 PM Amado CASAS   3/29/2021 10:00 AM Catina Calderon MD [facility] YESSENIA Roberto RN

## 2020-12-18 ENCOUNTER — HOSPITAL ENCOUNTER (OUTPATIENT)
Dept: PET IMAGING | Age: 80
Discharge: HOME | End: 2020-12-18
Payer: MEDICARE

## 2020-12-18 VITALS — BODY MASS INDEX: 31.76 KG/M2 | HEIGHT: 64 IN | WEIGHT: 186 LBS

## 2020-12-18 PROCEDURE — A9552 F18 FDG: HCPCS | Performed by: INTERNAL MEDICINE

## 2020-12-18 PROCEDURE — 3430000000 HC RX DIAGNOSTIC RADIOPHARMACEUTICAL: Performed by: INTERNAL MEDICINE

## 2020-12-18 PROCEDURE — 78815 PET IMAGE W/CT SKULL-THIGH: CPT

## 2020-12-18 RX ORDER — FLUDEOXYGLUCOSE F 18 200 MCI/ML
12.71 INJECTION, SOLUTION INTRAVENOUS
Status: COMPLETED | OUTPATIENT
Start: 2020-12-18 | End: 2020-12-18

## 2020-12-18 RX ADMIN — FLUDEOXYGLUCOSE F 18 12.71 MILLICURIE: 200 INJECTION, SOLUTION INTRAVENOUS at 19:17

## 2020-12-30 ENCOUNTER — HOSPITAL ENCOUNTER (OUTPATIENT)
Dept: MRI IMAGING | Age: 80
Discharge: HOME | End: 2020-12-30
Payer: MEDICARE

## 2020-12-30 ENCOUNTER — HOSPITAL ENCOUNTER (OUTPATIENT)
Age: 80
Discharge: HOME | End: 2020-12-30
Payer: MEDICARE

## 2020-12-30 LAB
BUN BLDV-MCNC: 10 MG/DL (ref 7–20)
CREAT SERPL-MCNC: 0.6 MG/DL (ref 0.6–1.2)
GFR AFRICAN AMERICAN: >60
GFR NON-AFRICAN AMERICAN: >60

## 2020-12-30 PROCEDURE — 6360000004 HC RX CONTRAST MEDICATION: Performed by: INTERNAL MEDICINE

## 2020-12-30 PROCEDURE — 74183 MRI ABD W/O CNTR FLWD CNTR: CPT

## 2020-12-30 PROCEDURE — A9581 GADOXETATE DISODIUM INJ: HCPCS | Performed by: INTERNAL MEDICINE

## 2020-12-30 PROCEDURE — 84520 ASSAY OF UREA NITROGEN: CPT

## 2020-12-30 PROCEDURE — 82565 ASSAY OF CREATININE: CPT

## 2020-12-30 PROCEDURE — 36415 COLL VENOUS BLD VENIPUNCTURE: CPT

## 2020-12-30 RX ADMIN — GADOXETATE DISODIUM 9 ML: 181.43 INJECTION, SOLUTION INTRAVENOUS at 10:37

## 2020-12-30 ASSESSMENT — ENCOUNTER SYMPTOMS
SHORTNESS OF BREATH: 0
COUGH: 0
EYE PAIN: 0
RHINORRHEA: 0
NAUSEA: 0
CONSTIPATION: 0
PHOTOPHOBIA: 0
VOMITING: 0
DIARRHEA: 0
SORE THROAT: 0

## 2020-12-30 NOTE — PROGRESS NOTES
[address] [facility]   Cardiovascular Evaluation    PATIENT: Zaid Rosas  DATE: 2021  MRN: <[id number]>  CSN: [id number]  : 1940      Primary Care Doctor: ANANT Voss CNP  Reason for evaluation:   Follow-up for chronic atypical chest pain, paroxysmal atrial fibrillation, non-obstructive coronary artery disease    TELEHEALTH EVALUATION -- Audio (During [facility] public health emergency)     Subjective:   Román Ryder. Jason Portillo is an 70-year-old female with a history of chronic atypical chest pain, paroxysmal atrial fibrillation, non-obstructive coronary artery disease, recurrent CVA with residual left-sided weakness, type II DM, essential hypertension, hyperlipidemia, childhood rheumatic fever, mildly dilated aortic arch, and hypothyroidism who presents for follow-up. Since her last visit on 10/1/20, the patient has had multiple hospitalizations. She was admitted 10/21-10/27/20 for acute respiratory failure 2/2 COVID-19 infection. She had persistent high fevers and required 2L NC. She was treated with Decadron, 3 days of Remdesivir and 2 units of convalescent plasma. She was weaned of oxygen at time of discharge. She was then admitted 10/29-20 with ongoing COVID symptoms, pulmonary vascular congestion, pneumonia, and weakness. She was admitted again -20 with rectal bleeding and abdominal pain. Her colonoscopy revealed a large bleeding rectal mass. Pathology showed adenocarcinoma of the colon. Her coumadin was subsequently discontinued. A PET scan from 20 showed low-density lesions within the liver and lungs and a hypermetabolic right hilar lymph node. She also had an abdominal MRI on 20 that showed multiple cystic lesions in the liver and a mildly enhancing soft tissue mass in the marlon hepatis concerning for lymphadenopathy. She has follow-up with oncology tomorrow to discuss staging and treatment options. Recently, she reports that her chronic atypical mid-sternal chest pain which was previously felt to be due to GERD has been less frequent. She uses SL nitro occasionally. She denies any worsening shortness of breath, palpitations, lower extremity edema, orthopnea, or paroxysmal nocturnal dyspnea. She sometimes gets mild lightheadedness when she gets up too quickly. Her BRBPR seems to have resolved since she stopped taking coumadin. She denies abdominal pain, nausea, vomiting, diarrhea, and melena. Patient Active Problem List   Diagnosis    Atypical chest pain    Type 2 diabetes mellitus without complication, without long-term current use of insulin (HCC)    Hypothyroidism    Painful orthopaedic hardware ([address]    Left ankle pain    Left foot pain    Ischemic stroke ([facility]    Chronic ischemic heart disease    Essential hypertension    Hyperlipidemia    Transient cerebral ischemia    Encounter for loop recorder check    Closed fracture of proximal end of left humerus    CVA, old, hemiparesis ([facility]    Vitamin D deficiency    Paroxysmal A-fib ([facility]    Coronary artery disease involving native coronary artery of native heart without angina pectoris    RBBB    Precordial pain    Pneumonia due to organism    COVID-19    Acute respiratory failure with hypoxia (HCC)    Pneumonia due to COVID-19 virus    Coagulopathy (HCC)    Positive blood culture    Weakness    Shortness of breath    Diarrhea    BRBPR (bright red blood per rectum)    Rectal bleeding    Abdominal mass       Past Medical History:   has a past medical history of Atrial fibrillation ([address] Cancer ([address] Cerebral artery occlusion with cerebral infarction ([address] COVID-19, Diabetes mellitus ([address] Fractures, Hypertension, and Thyroid disease.     Surgical History: has a past surgical history that includes Thyroidectomy; Hysterectomy; Cholecystectomy; cyst removal; Hand surgery; Ankle fracture surgery (2019); Hand surgery (2/21/12); Ankle surgery (Left, 6/17/2014); and Colonoscopy (N/A, 11/30/2020). Social History:   reports that she has never smoked. She has never used smokeless tobacco. She reports that she does not drink alcohol or use drugs. Family History:  No history of sudden cardiac death or premature CAD. Home Medications:  Reviewed and are listed in nursing record. and/or listed below  Current Outpatient Medications   Medication Sig Dispense Refill    amLODIPine (NORVASC) 5 MG tablet Take 1 tablet by mouth daily 30 tablet 2    levothyroxine (SYNTHROID) 112 MCG tablet Take 1 tablet by mouth Daily 30 tablet 3    hydrALAZINE (APRESOLINE) 25 MG tablet Take 2 tablets by mouth 3 times daily 90 tablet 3    Continuous Blood Gluc Sensor (CirclezonSTYLE ASHELY 14 DAY SENSOR) MISC Use for continuous blood glucose testing.  Change every 14 days 6 each 5    b complex vitamins capsule Take 1 capsule by mouth daily      nitroGLYCERIN (NITROSTAT) 0.4 MG SL tablet Place 1 tablet under the tongue every 5 minutes as needed for Chest pain 25 tablet 0    Insulin Syringe-Needle U-100 (INSULIN SYRINGE 1CC/30GX5/16\") 30G X 5/16\" 1 ML MISC 1 each by Does not apply route 3 times daily 270 each 0    insulin regular (HUMULIN R;NOVOLIN R) 100 UNIT/ML injection Inject 44-46 Units into the skin Daily with supper      insulin 70-30 (HUMULIN 70/30) (70-30) 100 UNIT per ML injection vial Inject 44-46 Units into the skin 2 times daily Morning and bedtime      nystatin (MYCOSTATIN) 196761 UNIT/GM cream       rosuvastatin (CRESTOR) 10 MG tablet Take 1 tablet by mouth daily 90 tablet 3    lisinopril (PRINIVIL;ZESTRIL) 40 MG tablet Take 40 mg by mouth daily      magnesium (MAGNESIUM-OXIDE) 250 MG TABS tablet Take 250 mg by mouth daily 3x/week  Omega-3 Fatty Acids (FISH OIL) 1200 MG CAPS Take 1 capsule by mouth 2 times daily       vitamin B-12 (CYANOCOBALAMIN) 1000 MCG tablet Take 1,000 mcg by mouth daily 3x/week       No current facility-administered medications for this visit. Allergies:  Cephalexin, Contrast [iodides], and Barium-containing compounds     Review of Systems:   A 14 point review of symptoms completed. Pertinent positives identified in the HPI, all other review of symptoms negative as below. Review of Systems   Constitutional: Negative for chills, diaphoresis and fever. HENT: Negative for congestion, rhinorrhea and sore throat. Eyes: Negative for photophobia, pain and visual disturbance. Respiratory: Negative for cough and shortness of breath. Cardiovascular: Positive for chest pain. Negative for palpitations and leg swelling. Gastrointestinal: Negative for abdominal pain, blood in stool, constipation, diarrhea, nausea and vomiting. Endocrine: Negative for cold intolerance and heat intolerance. Genitourinary: Negative for difficulty urinating, dysuria and hematuria. Musculoskeletal: Positive for arthralgias, gait problem and myalgias. Negative for joint swelling. Skin: Negative for rash and wound. Allergic/Immunologic: Negative for environmental allergies and food allergies. Neurological: Negative for dizziness, syncope and light-headedness. Hematological: Negative for adenopathy. Does not bruise/bleed easily. Psychiatric/Behavioral: Negative for dysphoric mood. The patient is not nervous/anxious. Objective:   PHYSICAL EXAM:    THIS AUDIO ENCOUNTER WAS PERFORMED VIRTUALLY AND THUS A PHYSICAL EXAMINATION COULD NOT BE PERFORMED.       LABS   CBC:      Lab Results   Component Value Date    WBC 8.1 12/01/2020    RBC 4.71 12/01/2020    HGB 13.9 12/01/2020    HCT 42.0 12/01/2020    MCV 89.3 12/01/2020    RDW 14.1 12/01/2020     12/01/2020     CMP:  Lab Results   Component Value Date  12/01/2020    K 3.7 12/01/2020    K 3.7 11/28/2020     12/01/2020    CO2 22 12/01/2020    BUN 10 12/30/2020    CREATININE 0.6 12/30/2020    GFRAA >60 12/30/2020    GFRAA >60 03/20/2013    AGRATIO 1.3 11/28/2020    LABGLOM >60 12/30/2020    GLUCOSE 267 12/01/2020    PROT 6.6 11/28/2020    PROT 7.4 03/20/2013    CALCIUM 9.6 12/01/2020    BILITOT 0.4 11/28/2020    ALKPHOS 71 11/28/2020    AST 15 11/28/2020    ALT 16 11/28/2020     PT/INR:   No results found for: PTINR  Liver:  No components found for: CHLPL  Lab Results   Component Value Date    ALT 16 11/28/2020    AST 15 11/28/2020    ALKPHOS 71 11/28/2020    BILITOT 0.4 11/28/2020     Lab Results   Component Value Date    LABA1C 9.0 11/28/2020     Lipids:         Lab Results   Component Value Date    TRIG 125 07/23/2018    TRIG 208 (H) 12/05/2016    TRIG 150 10/07/2016            Lab Results   Component Value Date    HDL 47 07/23/2018    HDL 43 12/05/2016    HDL 53 10/07/2016            Lab Results   Component Value Date    LDLCALC 60 07/23/2018    LDLCALC 126 (H) 12/05/2016    LDLCALC 125 (H) 10/07/2016            Lab Results   Component Value Date    LABVLDL 25 07/23/2018    LABVLDL 42 12/05/2016    LABVLDL 30 10/07/2016         CARDIAC DATA   EKG 11/27/20:  Sinus rhythm with premature atrial complexes, right bundle branch block, inferior infarct pattern, lateral T wave abnormality. ECHO:   TTE 8/10/19:   Summary   Left ventricular systolic function is normal with a visually estimated   ejection fraction of 55%. Mild concentric left ventricular hypertrophy. No   regional wall motion abnormalities are noted. Diastolic filling parameters   suggests impaired left ventricular relaxation. The aortic arch is mildly dilated at 3.9 cm. Consider other imaging   modalities (e.g. CT) to assess for evaluation of aorta. Mild mitral regurgitation.     STRESS TEST:   Pharmacologic Nuclear SPECT Stress 8/10/19: Summary There is normal isotope uptake at stress and rest. There is no evidence of  myocardial ischemia or scar. Normal LV function. Overall findings represent a low risk scan. CARDIAC CATH:   [facility] 01/11/00:  FINDINGS:    Normal left ventricular end diastolic pressure.    Normal left ventricular systolic function and ejection fraction estimated at about 60%.     Normal coronaries.    Normal renal arteries.         Cardiac cath:   [facility] 12/19/12:      Assessment:     1. Chronic atypical chest pain - Suspect secondary to GERD. Overall, symptoms seem to be improving. She underwent invasive coronary angiography in 2012 and had non-obstructive CAD with a moderate mid-LAD lesion that was not physiologically significant by FFR. Since that time, she has had multiple negative ischemic evaluations, most recently a pharmacologic nuclear SPECT stress test in 8/2019 that demonstrated normal myocardial perfusion. Biventricular systolic function preserved on most recent transthoracic echocardiogram.  2. Rectal adenocarcinoma - Underwent colonoscopy on 11/30/20 which revealed a bleeding near-obstructing rectal mass. Pathology showed adenocarcinoma. PET scan from 12/18/20 showed low-density lesions within the liver and lungs and a hypermetabolic right hilar lymph node. She also had an abdominal MRI on 12/30/20 that showed multiple cystic lesions in the liver and a mildly enhancing soft tissue mass in the marlon hepatis concerning for lymphadenopathy. She has follow-up with oncology tomorrow to discuss staging and treatment options. BRBPR seems to have resolved after stopping coumadin. 3. Paroxysmal atrial fibrillation - Currently in sinus rhythm. [id number] is 7. Coumadin was discontinued following recent diagnosis of bleeding rectal mass. 4. Non-obstructive coronary artery disease  5. Essential hypertension  6. Hyperlipidemia  7. Type II DM  8. H/o CVA  9. Mildly dilated aortic arch  10.  Hypothyroidism 6. H/o COVID-19 infection    Plan:     1. Although [id number] is 7, due to recent diagnosis of rectal adenocarcinoma and previous GI bleeding feel that risks of resuming anticoagulation likely outweigh benefits at this time. 2. Will start aspirin 81 mg daily. Instructed patient to notify clinic immediately if she has any recurrent bleeding. It is unclear whether any surgical intervention is being considered for her rectal mass, but if so it is OK to hold aspirin as needed for any procedures. She has follow-up with oncology tomorrow to further discuss staging and potential treatment options. 3. Continue rosuvastatin 10 mg daily, amlodipine 5 mg daily, lisinopril 40 mg daily, hydralazine 50 mg TID, and other medications as previously prescribed. 4. Follow-up with me in 6 months. Scribe's attestation: This note was scribed in the presence of [address] [place], [place] [address]DO amy by Gagandeep Cervantes RN       It is a pleasure to assist in the care of [address] [place] [address]. Please call with any questions. The scribes documentation has been prepared under my direction and personally reviewed by me in its entirety. I confirm that the note above accurately reflects all work, treatment, procedures, and medical decision making performed by me. I, Dr. Haylee Narayanan, personally performed the services described in this documentation as scribed by Gagandeep Cervantes RN in my presence, and it is both accurate and complete to the best of our ability.        Haylee Narayanan, [address] [facility]  [phone] [facility]  [phone] [facility]

## 2021-01-04 ENCOUNTER — VIRTUAL VISIT (OUTPATIENT)
Dept: CARDIOLOGY CLINIC | Age: 81
End: 2021-01-04
Payer: MEDICARE

## 2021-01-04 DIAGNOSIS — C20 RECTAL ADENOCARCINOMA (HCC): ICD-10-CM

## 2021-01-04 DIAGNOSIS — E11.9 TYPE 2 DIABETES MELLITUS WITHOUT COMPLICATION, WITHOUT LONG-TERM CURRENT USE OF INSULIN (HCC): ICD-10-CM

## 2021-01-04 DIAGNOSIS — I10 ESSENTIAL HYPERTENSION: Primary | ICD-10-CM

## 2021-01-04 DIAGNOSIS — E03.9 ACQUIRED HYPOTHYROIDISM: ICD-10-CM

## 2021-01-04 DIAGNOSIS — R07.89 ATYPICAL CHEST PAIN: ICD-10-CM

## 2021-01-04 DIAGNOSIS — I69.359 CVA, OLD, HEMIPARESIS (HCC): ICD-10-CM

## 2021-01-04 DIAGNOSIS — I48.0 PAROXYSMAL A-FIB (HCC): ICD-10-CM

## 2021-01-04 DIAGNOSIS — U07.1 COVID-19: ICD-10-CM

## 2021-01-04 DIAGNOSIS — E78.5 HYPERLIPIDEMIA, UNSPECIFIED HYPERLIPIDEMIA TYPE: ICD-10-CM

## 2021-01-04 PROCEDURE — 99214 OFFICE O/P EST MOD 30 MIN: CPT | Performed by: INTERNAL MEDICINE

## 2021-01-04 RX ORDER — ASPIRIN 81 MG/1
81 TABLET ORAL DAILY
Qty: 90 TABLET | Refills: 3 | Status: SHIPPED | OUTPATIENT
Start: 2021-01-04

## 2021-01-04 NOTE — PATIENT INSTRUCTIONS
1. Start aspirin 81 mg daily. Call the office [phone] if bleeding recurs and stop the aspirin. Ok to stop aspirin if procedures are needed  2. Keep your appointment with Dr Govind Keith  3. Continue current medications. Ok to take nitro as needed. Call the office if the chest pain gets worse.    4. Follow up in 6 months

## 2021-01-05 DIAGNOSIS — C20 RECTAL CANCER (HCC): Primary | ICD-10-CM

## 2021-01-07 ENCOUNTER — HOSPITAL ENCOUNTER (OUTPATIENT)
Dept: CT IMAGING | Age: 81
Discharge: HOME | End: 2021-01-07
Payer: MEDICARE

## 2021-01-07 ENCOUNTER — HOSPITAL ENCOUNTER (OUTPATIENT)
Age: 81
Discharge: HOME | End: 2021-01-07
Payer: MEDICARE

## 2021-01-07 VITALS
DIASTOLIC BLOOD PRESSURE: 69 MMHG | SYSTOLIC BLOOD PRESSURE: 145 MMHG | TEMPERATURE: 98.3 F | HEIGHT: 64 IN | WEIGHT: 185 LBS | RESPIRATION RATE: 14 BRPM | HEART RATE: 78 BPM | BODY MASS INDEX: 31.58 KG/M2 | OXYGEN SATURATION: 100 %

## 2021-01-07 DIAGNOSIS — C20 MALIGNANT NEOPLASM OF RECTUM (HCC): ICD-10-CM

## 2021-01-07 LAB
APTT: 34.2 SEC (ref 24.2–36.2)
INR BLD: 1.03 (ref 0.86–1.14)
PROTHROMBIN TIME: 11.9 SEC (ref 10–13.2)

## 2021-01-07 PROCEDURE — 36415 COLL VENOUS BLD VENIPUNCTURE: CPT

## 2021-01-07 PROCEDURE — 2709999900 CT NEEDLE BIOPSY LIVER PERCUTANEOUS

## 2021-01-07 PROCEDURE — 77012 CT SCAN FOR NEEDLE BIOPSY: CPT

## 2021-01-07 PROCEDURE — 85730 THROMBOPLASTIN TIME PARTIAL: CPT

## 2021-01-07 PROCEDURE — 6360000002 HC RX W HCPCS: Performed by: RADIOLOGY

## 2021-01-07 PROCEDURE — 88307 TISSUE EXAM BY PATHOLOGIST: CPT

## 2021-01-07 PROCEDURE — 85610 PROTHROMBIN TIME: CPT

## 2021-01-07 RX ORDER — MIDAZOLAM HYDROCHLORIDE 5 MG/ML
INJECTION INTRAMUSCULAR; INTRAVENOUS
Status: COMPLETED | OUTPATIENT
Start: 2021-01-07 | End: 2021-01-07

## 2021-01-07 RX ADMIN — MIDAZOLAM HYDROCHLORIDE 1 MG: 5 INJECTION, SOLUTION INTRAMUSCULAR; INTRAVENOUS at 08:47

## 2021-01-07 NOTE — PROGRESS NOTES
Image guided liver lesion biopsy completed. Two core samples obtained and sent to lab for pathology. Pt tolerted procedure without any signs or symptoms of distress. Vital signs stable see flow sheets. Pt taken to recovery bay for post operative monitoring.

## 2021-01-07 NOTE — PROGRESS NOTES
Pt cleared for discharge. Pt alert and oriented x 4. Denies any pain or SOB. Surgical site dressing clean, dry, and intact. Vital signs stable see flow sheets. Post Leti score 10. Discharge instructions provided and explained. All questions answered. Pt and  verbalize understanding of the discharge instructions and state no more questions.  Pt discharged in stable condition with all belongings in care of .

## 2021-01-10 ASSESSMENT — ENCOUNTER SYMPTOMS
BLOOD IN STOOL: 0
ABDOMINAL PAIN: 0

## 2021-01-19 ENCOUNTER — TELEPHONE (OUTPATIENT)
Dept: SURGERY | Age: 81
End: 2021-01-19

## 2021-01-19 NOTE — PROGRESS NOTES
PAT completed with patient no orders with chart or in Epic will check with MD DOS, patient not required to having Covid testing due to having the virus already per MD order. Patient aware of 1100 arrival time NPO after midnight the night prior to surgery may take AM medications with sip of water. Aware 1 family may come with her DOS but will remain in waiting room. Nu Wood

## 2021-01-19 NOTE — PROGRESS NOTES
Patient was positive for Covid on 11/28/2020 so is within the 90 day window per manager and is not having symptoms. Mirza Snow

## 2021-01-25 ENCOUNTER — ANESTHESIA EVENT (OUTPATIENT)
Dept: OPERATING ROOM | Age: 81
End: 2021-01-25
Payer: MEDICARE

## 2021-01-26 ENCOUNTER — APPOINTMENT (OUTPATIENT)
Dept: GENERAL RADIOLOGY | Age: 81
End: 2021-01-26
Attending: SURGERY
Payer: MEDICARE

## 2021-01-26 ENCOUNTER — ANESTHESIA (OUTPATIENT)
Dept: OPERATING ROOM | Age: 81
End: 2021-01-26
Payer: MEDICARE

## 2021-01-26 ENCOUNTER — HOSPITAL ENCOUNTER (OUTPATIENT)
Age: 81
Setting detail: OUTPATIENT SURGERY
Discharge: HOME | End: 2021-01-26
Attending: SURGERY | Admitting: SURGERY
Payer: MEDICARE

## 2021-01-26 VITALS
RESPIRATION RATE: 17 BRPM | DIASTOLIC BLOOD PRESSURE: 52 MMHG | OXYGEN SATURATION: 100 % | SYSTOLIC BLOOD PRESSURE: 106 MMHG

## 2021-01-26 VITALS
TEMPERATURE: 98.2 F | BODY MASS INDEX: 31.92 KG/M2 | RESPIRATION RATE: 14 BRPM | HEIGHT: 64 IN | WEIGHT: 187 LBS | OXYGEN SATURATION: 98 % | SYSTOLIC BLOOD PRESSURE: 140 MMHG | HEART RATE: 74 BPM | DIASTOLIC BLOOD PRESSURE: 62 MMHG

## 2021-01-26 DIAGNOSIS — C20 RECTAL CANCER (HCC): ICD-10-CM

## 2021-01-26 LAB
GLUCOSE BLD-MCNC: 214 MG/DL (ref 70–99)
INR BLD: 1.05 (ref 0.86–1.14)
PERFORMED ON: ABNORMAL
PLATELET # BLD: 226 K/UL (ref 135–450)
PROTHROMBIN TIME: 12.2 SEC (ref 10–13.2)

## 2021-01-26 PROCEDURE — 36561 INSERT TUNNELED CV CATH: CPT | Performed by: SURGERY

## 2021-01-26 PROCEDURE — 3700000000 HC ANESTHESIA ATTENDED CARE: Performed by: SURGERY

## 2021-01-26 PROCEDURE — 85049 AUTOMATED PLATELET COUNT: CPT

## 2021-01-26 PROCEDURE — 2500000003 HC RX 250 WO HCPCS: Performed by: NURSE ANESTHETIST, CERTIFIED REGISTERED

## 2021-01-26 PROCEDURE — 2500000003 HC RX 250 WO HCPCS: Performed by: SURGERY

## 2021-01-26 PROCEDURE — 3700000001 HC ADD 15 MINUTES (ANESTHESIA): Performed by: SURGERY

## 2021-01-26 PROCEDURE — 77001 FLUOROGUIDE FOR VEIN DEVICE: CPT

## 2021-01-26 PROCEDURE — 3600000002 HC SURGERY LEVEL 2 BASE: Performed by: SURGERY

## 2021-01-26 PROCEDURE — 77001 FLUOROGUIDE FOR VEIN DEVICE: CPT | Performed by: SURGERY

## 2021-01-26 PROCEDURE — 2580000003 HC RX 258: Performed by: SURGERY

## 2021-01-26 PROCEDURE — 2580000003 HC RX 258: Performed by: ANESTHESIOLOGY

## 2021-01-26 PROCEDURE — C1788 PORT, INDWELLING, IMP: HCPCS | Performed by: SURGERY

## 2021-01-26 PROCEDURE — 7100000011 HC PHASE II RECOVERY - ADDTL 15 MIN: Performed by: SURGERY

## 2021-01-26 PROCEDURE — 85610 PROTHROMBIN TIME: CPT

## 2021-01-26 PROCEDURE — 6360000002 HC RX W HCPCS: Performed by: NURSE ANESTHETIST, CERTIFIED REGISTERED

## 2021-01-26 PROCEDURE — 3600000012 HC SURGERY LEVEL 2 ADDTL 15MIN: Performed by: SURGERY

## 2021-01-26 PROCEDURE — 2709999900 HC NON-CHARGEABLE SUPPLY: Performed by: SURGERY

## 2021-01-26 PROCEDURE — 36415 COLL VENOUS BLD VENIPUNCTURE: CPT

## 2021-01-26 PROCEDURE — 7100000010 HC PHASE II RECOVERY - FIRST 15 MIN: Performed by: SURGERY

## 2021-01-26 PROCEDURE — 2580000003 HC RX 258: Performed by: NURSE ANESTHETIST, CERTIFIED REGISTERED

## 2021-01-26 PROCEDURE — 71045 X-RAY EXAM CHEST 1 VIEW: CPT

## 2021-01-26 PROCEDURE — 6360000002 HC RX W HCPCS: Performed by: SURGERY

## 2021-01-26 DEVICE — PORT INFUS 8FR PWR INJ CT FOR VASC ACCS CATH: Type: IMPLANTABLE DEVICE | Site: CHEST | Status: FUNCTIONAL

## 2021-01-26 RX ORDER — OXYCODONE HYDROCHLORIDE AND ACETAMINOPHEN 5; 325 MG/1; MG/1
1 TABLET ORAL PRN
Status: DISCONTINUED | OUTPATIENT
Start: 2021-01-26 | End: 2021-01-26 | Stop reason: HOSPADM

## 2021-01-26 RX ORDER — LIDOCAINE HYDROCHLORIDE 20 MG/ML
INJECTION, SOLUTION INFILTRATION; PERINEURAL PRN
Status: DISCONTINUED | OUTPATIENT
Start: 2021-01-26 | End: 2021-01-26 | Stop reason: SDUPTHER

## 2021-01-26 RX ORDER — HYDROCODONE BITARTRATE AND ACETAMINOPHEN 5; 325 MG/1; MG/1
1 TABLET ORAL EVERY 6 HOURS PRN
Qty: 12 TABLET | Refills: 0 | Status: SHIPPED | OUTPATIENT
Start: 2021-01-26 | End: 2021-01-29

## 2021-01-26 RX ORDER — HYDRALAZINE HYDROCHLORIDE 20 MG/ML
5 INJECTION INTRAMUSCULAR; INTRAVENOUS EVERY 10 MIN PRN
Status: DISCONTINUED | OUTPATIENT
Start: 2021-01-26 | End: 2021-01-26 | Stop reason: HOSPADM

## 2021-01-26 RX ORDER — LABETALOL HYDROCHLORIDE 5 MG/ML
5 INJECTION, SOLUTION INTRAVENOUS EVERY 10 MIN PRN
Status: DISCONTINUED | OUTPATIENT
Start: 2021-01-26 | End: 2021-01-26 | Stop reason: HOSPADM

## 2021-01-26 RX ORDER — BUPIVACAINE HYDROCHLORIDE 5 MG/ML
INJECTION, SOLUTION EPIDURAL; INTRACAUDAL PRN
Status: DISCONTINUED | OUTPATIENT
Start: 2021-01-26 | End: 2021-01-26 | Stop reason: ALTCHOICE

## 2021-01-26 RX ORDER — HEPARIN SODIUM (PORCINE) LOCK FLUSH IV SOLN 100 UNIT/ML 100 UNIT/ML
SOLUTION INTRAVENOUS PRN
Status: DISCONTINUED | OUTPATIENT
Start: 2021-01-26 | End: 2021-01-26 | Stop reason: ALTCHOICE

## 2021-01-26 RX ORDER — MEPERIDINE HYDROCHLORIDE 25 MG/ML
12.5 INJECTION INTRAMUSCULAR; INTRAVENOUS; SUBCUTANEOUS EVERY 5 MIN PRN
Status: DISCONTINUED | OUTPATIENT
Start: 2021-01-26 | End: 2021-01-26 | Stop reason: HOSPADM

## 2021-01-26 RX ORDER — ONDANSETRON 2 MG/ML
4 INJECTION INTRAMUSCULAR; INTRAVENOUS EVERY 10 MIN PRN
Status: DISCONTINUED | OUTPATIENT
Start: 2021-01-26 | End: 2021-01-26 | Stop reason: HOSPADM

## 2021-01-26 RX ORDER — OXYCODONE HYDROCHLORIDE AND ACETAMINOPHEN 5; 325 MG/1; MG/1
2 TABLET ORAL PRN
Status: DISCONTINUED | OUTPATIENT
Start: 2021-01-26 | End: 2021-01-26 | Stop reason: HOSPADM

## 2021-01-26 RX ORDER — SODIUM CHLORIDE, SODIUM LACTATE, POTASSIUM CHLORIDE, CALCIUM CHLORIDE 600; 310; 30; 20 MG/100ML; MG/100ML; MG/100ML; MG/100ML
INJECTION, SOLUTION INTRAVENOUS CONTINUOUS
Status: DISCONTINUED | OUTPATIENT
Start: 2021-01-26 | End: 2021-01-26 | Stop reason: HOSPADM

## 2021-01-26 RX ORDER — PROPOFOL 10 MG/ML
INJECTION, EMULSION INTRAVENOUS PRN
Status: DISCONTINUED | OUTPATIENT
Start: 2021-01-26 | End: 2021-01-26 | Stop reason: SDUPTHER

## 2021-01-26 RX ORDER — SODIUM CHLORIDE, SODIUM LACTATE, POTASSIUM CHLORIDE, CALCIUM CHLORIDE 600; 310; 30; 20 MG/100ML; MG/100ML; MG/100ML; MG/100ML
INJECTION, SOLUTION INTRAVENOUS CONTINUOUS PRN
Status: DISCONTINUED | OUTPATIENT
Start: 2021-01-26 | End: 2021-01-26 | Stop reason: SDUPTHER

## 2021-01-26 RX ADMIN — SODIUM CHLORIDE, POTASSIUM CHLORIDE, SODIUM LACTATE AND CALCIUM CHLORIDE: 600; 310; 30; 20 INJECTION, SOLUTION INTRAVENOUS at 13:41

## 2021-01-26 RX ADMIN — SODIUM CHLORIDE, POTASSIUM CHLORIDE, SODIUM LACTATE AND CALCIUM CHLORIDE: 600; 310; 30; 20 INJECTION, SOLUTION INTRAVENOUS at 11:23

## 2021-01-26 RX ADMIN — PROPOFOL 80 MG: 10 INJECTION, EMULSION INTRAVENOUS at 14:06

## 2021-01-26 RX ADMIN — LIDOCAINE HYDROCHLORIDE 50 MG: 20 INJECTION, SOLUTION INFILTRATION; PERINEURAL at 13:46

## 2021-01-26 RX ADMIN — VANCOMYCIN HYDROCHLORIDE 1000 MG: 1 INJECTION, POWDER, LYOPHILIZED, FOR SOLUTION INTRAVENOUS at 13:48

## 2021-01-26 RX ADMIN — PROPOFOL 200 MG: 10 INJECTION, EMULSION INTRAVENOUS at 13:46

## 2021-01-26 ASSESSMENT — PULMONARY FUNCTION TESTS: PIF_VALUE: 0

## 2021-01-26 ASSESSMENT — ENCOUNTER SYMPTOMS: SHORTNESS OF BREATH: 1

## 2021-01-26 NOTE — OP NOTE
clip.  The port was placed in the subcutaneous pocket and secured to the  chest wall with interrupted sutures of 3-0 Vicryl. Fluoro showed the  entire apparatus to be in good position. There was excellent blood  return and easy flush, and it was final flushed. The incision was  closed with interrupted subcutaneous sutures of 3-0 Vicryl and a running  subcuticular suture of 4-0 Vicryl. Benzoin, Steri-Strips, and dry  sterile dressings were applied. All sponge, needle, and instrument  counts were correct at the end of the case. The patient tolerated the  procedure well. She was taken to the recovery area in stable condition. Dee Foster MD    D: 01/26/2021 14:25:52       T: 01/26/2021 14:33:34     [id number]  Job#: [id number]     Doc#: [id number]    CC:  Jenifer Thorpe.  MD Riky Quan, CNP

## 2021-01-26 NOTE — BRIEF OP NOTE
Brief Postoperative Note      Patient: Gee Guevara  YOB: 1940  MRN: [id number]    Date of Procedure: 1/26/2021    Pre-Op Diagnosis: RECTAL CANCER, POOR VENOUS ACCESS    Post-Op Diagnosis: Same       Procedure(s):  PORT INSERTION    Surgeon(s):  Lalito Quiros MD    Assistant:  Surgical Assistant: Jose Brink    Anesthesia: Monitor Anesthesia Care    Estimated Blood Loss (mL): less than 50     Complications: None    Specimens:   * No specimens in log *    Implants:  Implant Name Type Inv.  Item Serial No.  Lot No. LRB No. Used Action   PORT INFUS 8FR PWR INJ CT FOR VASC ACCS CATH  PORT INFUS 8FR PWR INJ CT FOR VASC ACCS CATH  [facility]- [id number] Left 1 Implanted         Drains:   External Urinary Catheter  (Active)       Findings: as above    Electronically signed by Rosendo Troncoso MD on 1/26/2021 at 2:08 PM

## 2021-01-26 NOTE — ANESTHESIA POSTPROCEDURE EVALUATION
Department of Anesthesiology  Postprocedure Note    Patient: Gee Guevara  MRN: [id number]  YOB: 1940  Date of evaluation: 1/26/2021  Time:  2:53 PM     Procedure Summary     Date: 01/26/21 Room / Location: [facility]    Anesthesia Start: 4013 Anesthesia Stop: 2522    Procedure: PORT INSERTION (Left Chest) Diagnosis: (RECTAL CANCER, POOR VENOUS ACCESS)    Surgeons: Lalito Quiros MD Responsible Provider: Andrae Duarte MD    Anesthesia Type: MAC ASA Status: 3          Anesthesia Type: MAC    Leti Phase I: Leti Score: 10    Leti Phase II: Leti Score: 10    Last vitals: Reviewed and per EMR flowsheets.        Anesthesia Post Evaluation    Patient location during evaluation: PACU  Level of consciousness: awake  Airway patency: patent  Nausea & Vomiting: no nausea  Complications: no  Cardiovascular status: blood pressure returned to baseline  Respiratory status: acceptable  Hydration status: euvolemic

## 2021-01-26 NOTE — PROGRESS NOTES
Preoperative Screening for Elective Surgery/Invasive Procedures While COVID-19 present in the community    ? Have you had any of the following symptoms? no  o Fever, chills  o Cough  o Shortness of breath  o Muscle aches/pain  o Diarrhea  o Abdominal pain, nausea, vomiting  o Loss or decrease in taste and / or smell  ? Risk of Exposure had covid two months ago  o Have you recently been hospitalized for COVID-19 or flu-like illness, if so when?  o Recently diagnosed with COVID-19, if so when?  o Recently tested for COVID-19, if so when?  o Have you been in close contact with a person or family member who currently has or recently had COVID-23? If yes, when and in what context?  o Do you live with anybody who in the last 14 days has had fever, chills, shortness of breath, muscle aches, flu-like illness?  o Do you have any close contacts or family members who are currently in the hospital for COVID-19 or flu-like illness? If yes, assess recent close contact with this person. Indicate if the patient has a positive screen by answering yes to one or more of the above questions. Patients who test positive or screen positive prior to surgery or on the day of surgery should be evaluated in conjunction with the surgeon/proceduralist/anesthesiologist to determine the urgency of the procedure.

## 2021-01-26 NOTE — H&P
I have reviewed the progress note of Dr. Britton Kelley serving as history and physical dated January/15/ 2021 (scanned into media tab 1/18/2021) and examined the patient and find no relevant changes. I have reviewed with the patient and/or family the risks, benefits, and alternatives to the procedure.

## 2021-01-26 NOTE — ANESTHESIA PRE PROCEDURE
Department of Anesthesiology  Preprocedure Note       Name:  Prabhu Parks   Age:  79 y.o.  :  1940                                          MRN:  [id number]         Date:  2021      Surgeon: Mona Cruz):  Judith Murray MD    Procedure: Procedure(s):  PORT INSERTION    Medications prior to admission:   Prior to Admission medications    Medication Sig Start Date End Date Taking? Authorizing Provider   aspirin EC 81 MG EC tablet Take 1 tablet by mouth daily 21   Vasquez Mayo DO   amLODIPine (NORVASC) 5 MG tablet Take 1 tablet by mouth daily 20   Rc Gusman MD   levothyroxine (SYNTHROID) 112 MCG tablet Take 1 tablet by mouth Daily 20   Rc Gusman MD   Continuous Blood Gluc Sensor (FREESTYLE ASHELY 14 DAY SENSOR) [facility] [place] for continuous blood glucose testing.  Change every 14 days 20   Ardie Kussmaul, MD   b complex vitamins capsule Take 1 capsule by mouth daily    Historical Provider, MD   nitroGLYCERIN (NITROSTAT) 0.4 MG SL tablet Place 1 tablet under the tongue every 5 minutes as needed for Chest pain 20   ANANT Martin CNP   Insulin Syringe-Needle U-100 (INSULIN SYRINGE 1CC/30GX5/16\") 30G X 5/16\" 1 ML MISC 1 each by Does not apply route 3 times daily 20   ANANT Sood CNP   insulin regular (HUMULIN R;NOVOLIN R) 100 UNIT/ML injection Inject 42 Units into the skin Daily with supper     Historical Provider, MD   insulin 70-30 (HUMULIN 70/30) (70-30) 100 UNIT per ML injection vial Inject 44-46 Units into the skin 2 times daily Morning and bedtime    Historical Provider, MD   nystatin (MYCOSTATIN) 015677 UNIT/GM cream  2/10/20   Historical Provider, MD   rosuvastatin (CRESTOR) 10 MG tablet Take 1 tablet by mouth daily 18   ANANT De Santiago CNP   lisinopril (PRINIVIL;ZESTRIL) 40 MG tablet Take 40 mg by mouth daily    Historical Provider, MD magnesium (MAGNESIUM-OXIDE) 250 MG TABS tablet Take 250 mg by mouth daily 3x/week    Historical Provider, MD   vitamin B-12 (CYANOCOBALAMIN) 1000 MCG tablet Take 1,000 mcg by mouth daily 3x/week    Historical Provider, MD       Current medications:    No current facility-administered medications for this encounter. Current Outpatient Medications   Medication Sig Dispense Refill    aspirin EC 81 MG EC tablet Take 1 tablet by mouth daily 90 tablet 3    amLODIPine (NORVASC) 5 MG tablet Take 1 tablet by mouth daily 30 tablet 2    levothyroxine (SYNTHROID) 112 MCG tablet Take 1 tablet by mouth Daily 30 tablet 3    Continuous Blood Gluc Sensor (FREESTYLE ASHELY 14 DAY SENSOR) MISC Use for continuous blood glucose testing. Change every 14 days 6 each 5    b complex vitamins capsule Take 1 capsule by mouth daily      nitroGLYCERIN (NITROSTAT) 0.4 MG SL tablet Place 1 tablet under the tongue every 5 minutes as needed for Chest pain 25 tablet 0    Insulin Syringe-Needle U-100 (INSULIN SYRINGE 1CC/30GX5/16\") 30G X 5/16\" 1 ML MISC 1 each by Does not apply route 3 times daily 270 each 0    insulin regular (HUMULIN R;NOVOLIN R) 100 UNIT/ML injection Inject 42 Units into the skin Daily with supper       insulin 70-30 (HUMULIN 70/30) (70-30) 100 UNIT per ML injection vial Inject 44-46 Units into the skin 2 times daily Morning and bedtime      nystatin (MYCOSTATIN) 278261 UNIT/GM cream       rosuvastatin (CRESTOR) 10 MG tablet Take 1 tablet by mouth daily 90 tablet 3    lisinopril (PRINIVIL;ZESTRIL) 40 MG tablet Take 40 mg by mouth daily      magnesium (MAGNESIUM-OXIDE) 250 MG TABS tablet Take 250 mg by mouth daily 3x/week      vitamin B-12 (CYANOCOBALAMIN) 1000 MCG tablet Take 1,000 mcg by mouth daily 3x/week         Allergies:     Allergies   Allergen Reactions    Cephalexin     Contrast [Iodides] Shortness Of Breath    Barium-Containing Compounds        Problem List:    Patient Active Problem List CT NEEDLE BIOPSY LIVER PERCUTANEOUS 1/7/2021 [facility] CT SCAN    CYST REMOVAL      chest x2, back x1    HAND SURGERY      right    HAND SURGERY  2/21/12    DUPUYTRENS RELEASE LEFT MIDDLE AND RING FINGERS INCLUDING    HYSTERECTOMY      THYROIDECTOMY         Social History:    Social History     Tobacco Use    Smoking status: Never Smoker    Smokeless tobacco: Never Used   Substance Use Topics    Alcohol use: No                                Counseling given: Not Answered      Vital Signs (Current):   Vitals:    01/19/21 1421   Weight: 187 lb (84.8 kg)   Height: 5' 4\" (1.626 m)                                              BP Readings from Last 3 Encounters:   01/07/21 (!) 145/69   12/01/20 (!) 150/73   11/02/20 (!) 150/76       NPO Status:                                                                                 BMI:   Wt Readings from Last 3 Encounters:   01/07/21 185 lb (83.9 kg)   12/18/20 186 lb (84.4 kg)   11/27/20 190 lb (86.2 kg)     Body mass index is 32.1 kg/m². CBC:   Lab Results   Component Value Date    WBC 8.1 12/01/2020    RBC 4.71 12/01/2020    HGB 13.9 12/01/2020    HCT 42.0 12/01/2020    MCV 89.3 12/01/2020    RDW 14.1 12/01/2020     12/01/2020       CMP:   Lab Results   Component Value Date     12/01/2020    K 3.7 12/01/2020    K 3.7 11/28/2020     12/01/2020    CO2 22 12/01/2020    BUN 10 12/30/2020    CREATININE 0.6 12/30/2020    GFRAA >60 12/30/2020    GFRAA >60 03/20/2013    AGRATIO 1.3 11/28/2020    LABGLOM >60 12/30/2020    GLUCOSE 267 12/01/2020    PROT 6.6 11/28/2020    PROT 7.4 03/20/2013    CALCIUM 9.6 12/01/2020    BILITOT 0.4 11/28/2020    ALKPHOS 71 11/28/2020    AST 15 11/28/2020    ALT 16 11/28/2020       POC Tests: No results for input(s): POCGLU, POCNA, POCK, POCCL, POCBUN, POCHEMO, POCHCT in the last 72 hours.     Coags:   Lab Results   Component Value Date    PROTIME 11.9 01/07/2021    INR 1.03 01/07/2021    APTT 34.2 01/07/2021 HCG (If Applicable): No results found for: PREGTESTUR, PREGSERUM, HCG, HCGQUANT     ABGs: No results found for: PHART, PO2ART, AZF4TMC, JQG2MBR, BEART, Z1CHOYED     Type & Screen (If Applicable):  No results found for: LABABO, LABRH    Drug/Infectious Status (If Applicable):  No results found for: HIV, HEPCAB    COVID-19 Screening (If Applicable):   Lab Results   Component Value Date    COVID19 DETECTED 11/28/2020         Anesthesia Evaluation  Patient summary reviewed and Nursing notes reviewed no history of anesthetic complications:   Airway: Mallampati: II  TM distance: >3 FB   Neck ROM: full  Mouth opening: > = 3 FB Dental:    (+) upper dentures, lower dentures and edentulous      Pulmonary:   (+) pneumonia:  shortness of breath:                             Cardiovascular:    (+) hypertension:, CAD:, dysrhythmias: atrial fibrillation,                   Neuro/Psych:   (+) CVA: residual symptoms,             GI/Hepatic/Renal: Neg GI/Hepatic/Renal ROS       (-) GERD, liver disease and no renal disease       Endo/Other:    (+) DiabetesType II DM, using insulin, hypothyroidism::., malignancy/cancer (rectal). Abdominal:           Vascular: negative vascular ROS. Anesthesia Plan      MAC     ASA 3       Induction: intravenous. Anesthetic plan and risks discussed with patient. Plan discussed with CRNA. All questions answered and agrees with plan.         Harris Oneal MD   1/26/2021

## 2021-02-16 ENCOUNTER — TELEPHONE (OUTPATIENT)
Dept: CARDIOLOGY CLINIC | Age: 81
End: 2021-02-16

## 2021-02-16 NOTE — TELEPHONE ENCOUNTER
If chest pain has resolved, OK to monitor for now. If she has recurrent symptoms that are unresponsive to nitroglycerin she should call 911 and go to the ED.

## 2021-03-05 ENCOUNTER — IMMUNIZATION (OUTPATIENT)
Dept: PRIMARY CARE CLINIC | Age: 81
End: 2021-03-05
Payer: MEDICARE

## 2021-03-05 PROCEDURE — 0011A COVID-19, MODERNA VACCINE 100MCG/0.5ML DOSE: CPT | Performed by: FAMILY MEDICINE

## 2021-03-05 PROCEDURE — 91301 COVID-19, MODERNA VACCINE 100MCG/0.5ML DOSE: CPT | Performed by: FAMILY MEDICINE

## 2021-03-22 ENCOUNTER — HOSPITAL ENCOUNTER (OUTPATIENT)
Dept: CT IMAGING | Age: 81
Discharge: HOME | End: 2021-03-22
Payer: MEDICARE

## 2021-03-22 DIAGNOSIS — C20 RECTAL CANCER (HCC): ICD-10-CM

## 2021-03-22 PROCEDURE — 74176 CT ABD & PELVIS W/O CONTRAST: CPT

## 2021-03-22 PROCEDURE — 6360000004 HC RX CONTRAST MEDICATION: Performed by: INTERNAL MEDICINE

## 2021-03-22 RX ADMIN — IOHEXOL 50 ML: 240 INJECTION, SOLUTION INTRATHECAL; INTRAVASCULAR; INTRAVENOUS; ORAL at 10:46

## 2021-03-23 DIAGNOSIS — E11.00 DM HYPEROSMOLARITY TYPE II, UNCONTROLLED (HCC): ICD-10-CM

## 2021-03-23 DIAGNOSIS — E11.65 DM HYPEROSMOLARITY TYPE II, UNCONTROLLED (HCC): ICD-10-CM

## 2021-03-23 RX ORDER — FLASH GLUCOSE SENSOR
KIT MISCELLANEOUS
Qty: 6 EACH | Refills: 5 | Status: SHIPPED | OUTPATIENT
Start: 2021-03-23 | End: 2021-03-24 | Stop reason: SDUPTHER

## 2021-03-23 NOTE — TELEPHONE ENCOUNTER
[facility] free [facility] [place] [place] [facility] [address] [facility] [phone]

## 2021-03-24 RX ORDER — FLASH GLUCOSE SENSOR
KIT MISCELLANEOUS
Qty: 6 EACH | Refills: 5 | Status: SHIPPED | OUTPATIENT
Start: 2021-03-24 | End: 2021-03-26 | Stop reason: SDUPTHER

## 2021-03-26 ENCOUNTER — VIRTUAL VISIT (OUTPATIENT)
Dept: ENDOCRINOLOGY | Age: 81
End: 2021-03-26
Payer: MEDICARE

## 2021-03-26 DIAGNOSIS — E11.65 DM HYPEROSMOLARITY TYPE II, UNCONTROLLED (HCC): Primary | ICD-10-CM

## 2021-03-26 DIAGNOSIS — E11.00 DM HYPEROSMOLARITY TYPE II, UNCONTROLLED (HCC): Primary | ICD-10-CM

## 2021-03-26 PROCEDURE — 99213 OFFICE O/P EST LOW 20 MIN: CPT | Performed by: NURSE PRACTITIONER

## 2021-03-26 RX ORDER — FLASH GLUCOSE SENSOR
KIT MISCELLANEOUS
Qty: 6 EACH | Refills: 5 | Status: SHIPPED | OUTPATIENT
Start: 2021-03-26

## 2021-03-26 ASSESSMENT — ENCOUNTER SYMPTOMS
COLOR CHANGE: 0
NAUSEA: 0
DIARRHEA: 0
CONSTIPATION: 0
EYE PAIN: 0
SHORTNESS OF BREATH: 0

## 2021-03-26 NOTE — PROGRESS NOTES
Endocrinology  Percy Max, CNP, [address] [facility] [address]   [address] [facility], [address]  Phone [phone]  Fax [phone]    Johann Jara is  being evaluated by a Virtual Visit (phone visit) encounter to address concerns as mentioned above. Due to this being a TeleHealth encounter (During PCQUN-94 public health emergency), evaluation of the following organ systems was limited: Vitals/Constitutional/EENT/Resp/CV/GI//MS/Neuro/Skin/Heme-Lymph-Imm. Pursuant to the emergency declaration under the [facility], [address] [place] [address] authority and the [employer] Act, this Virtual Visit was conducted with patient's (and/or legal guardian's) consent, to reduce the patient's risk of exposure to COVID-19 and provide necessary medical care. The patient (and/or legal guardian) has also been advised to contact this office for worsening conditions or problems, and seek emergency medical treatment and/or call 911 if deemed necessary. Services were provided through a telephone synchronous discussion virtually to substitute for in-person clinic visit. Patient and provider were located at their individual homes    Patient was identified via name,  on the phone visit    Johann Jara is a 79 y.o. female who is a new patient presenting for management of Diabetes Mellitus Type 2.     Last A1C:   Lab Results   Component Value Date    LABA1C 9.0 2020    LABA1C 9.2 10/21/2020    LABA1C 8.5 2020     Last BP Readings:  BP Readings from Last 3 Encounters:   21 (!) 106/52   21 (!) 140/62   21 (!) 145/69     Last LDL:   Lab Results   Component Value Date    LDLCALC 60 2018    LDLDIRECT 125 (H) 2015     Aspirin Use: no    Tobacco/Alcohol History:    Smoking status: Never Smoker    Smokeless tobacco: Never Used    Alcohol use: No     PMH includes  Past Medical History:   Diagnosis Date  Atrial fibrillation (HCC)     Cancer ([facility]     Cerebral artery occlusion with cerebral infarction ([facility]     left sided weakness-left arm and left leg, also left eye    COVID-19 11/28/2020    Diabetes mellitus ([facility]     Fractures     Hypertension     Thyroid disease      Diabetes:  Nayeli Todd  was diagnosed with diabetes type 2 in : > 30 years   On insulin: > 25 years   Patient reports that diabetes is generally not well controlled.  Disease course has been variable    Current microvascular complications include none.  Current Macrovascular complications include Stroke, CVD, CAD.  Patient reports compliance for about 80% of the time and adheres to medication, but usually not to diet and exercise instructions.  There have been no recent hospital or ED admissions for hypoglycemia, hyperglycemia or DKA.  Other ED visit include for : chest pain    HPI on 3/26/2021  Recent diagnosis of rectal cancer  No longer on chemo: hospice at home currently  Reviewed kimi readings: 178 for 90 days   is primary care giver, adjusts insulin doses as needed    Blood glucose trends:  Patient brought log glucometer for download: no  Checks blood sugar 4 times --> 3 times  BG Range: high 200 -  low 300s   Hypoglycemia awareness and symptoms: 69 , hypoglcyemia unawareness at times==> 50  Now uses a kimi sensor  90 days: 192  7 days: 216    Current Medication regimen:   Insulin 70/30 : 30-40 BID--> 44/46 --> 40/42 (  titrates dose based on BG, does not follow sliding scale)--> dosing decreased to approx 20/25  Insulin R: 13-16 units PRN at lunch ---> 42 units based on titration--> 15-30 as needed    Previous meds  Levemir 36 units BID  Other meds tried in past: lantus, humalog, insulin R, NPH  GFR > 60    Current Dietary regimen:   Has recently improved her diet  BF: sausage and egg,banana coffee  Lunch: cabbage/cauliflower.  Protein portions-> lunchmeat sw,   Supper: stew, protein,   Snacks: none Beverages: coffee, diet soda, water  Average carbs per day > 100    Microvascular Complications:  · Neuropathy: denies concerns, post stroke for 4years, left sided paraplegia  · Retinopathy: blurry vision, denies any diabetic eye changes  · Nephropathy: no recent MACR    Diabetic Health Maintenance   · Last Eye Exam: > 1 year  · Last Foot exam: none recently  · Has patient seen a dietitian? Yes  · Current Exercise: No structured exercise  · On ACEI or ARB: lisinopril 40 mg QD  · On statin: Crestor 10 mg QD    Hyperlipidemia: Current complaints include occasional myalgias but otherwise tolerates well. Lab Results   Component Value Date    CHOL 132 07/23/2018    CHOL 211 12/05/2016    CHOL 208 10/07/2016     Lab Results   Component Value Date    TRIG 125 07/23/2018    TRIG 208 12/05/2016    TRIG 150 10/07/2016     Lab Results   Component Value Date    HDL 47 07/23/2018    HDL 43 12/05/2016    HDL 53 10/07/2016     Lab Results   Component Value Date    LDLCALC 60 07/23/2018    LDLCALC 126 12/05/2016    [address] 125 10/07/2016     Lab Results   Component Value Date    LDLDIRECT 125 07/02/2015     No results found for: CHOLHDLRATIO    Vitamin D deficiency: Currently is on no rx supplementation. Current complaints include fatigue on daily basis. Last vitamin Dlevel is:  Lab Results   Component Value Date    VITD25 43.2 10/22/2020    VITD25 30.8 10/01/2019    VITD25 25.3 11/20/2015       Hypertension  Elevated at visit. She denies symptoms of dizziness, light headedness. Occasional dependent edema. Tries to follow a salt restricted diet.    Lab Results   Component Value Date     12/01/2020    K 3.7 12/01/2020     12/01/2020    CO2 22 12/01/2020    BUN 10 12/30/2020    CREATININE 0.6 12/30/2020    GLUCOSE 267 (H) 12/01/2020    CALCIUM 9.6 12/01/2020    PROT 6.6 11/28/2020    LABALBU 3.7 11/28/2020    BILITOT 0.4 11/28/2020    ALKPHOS 71 11/28/2020    AST 15 11/28/2020    ALT 16 11/28/2020    LABGLOM >60 12/30/2020 GFRAA >60 12/30/2020    AGRATIO 1.3 11/28/2020    GLOB 2.9 11/28/2020     The ASCVD Risk score (Memo Medina., et al., 2013) failed to calculate for the following reasons: The 2013 ASCVD risk score is only valid for ages 36 to 78    The patient has a prior MI or stroke diagnosis    Family History   Problem Relation Age of Onset    Cancer Mother     Diabetes Mother     Cancer Brother     High Blood Pressure Brother     Depression Maternal Aunt     Heart Disease Brother     Diabetes Brother     High Blood Pressure Brother     Heart Disease Brother     Diabetes Brother     High Blood Pressure Brother      Review of Systems   Constitutional: Negative for activity change, appetite change, diaphoresis, fever and unexpected weight change. HENT: Negative for dental problem. Eyes: Negative for pain and visual disturbance. Respiratory: Negative for shortness of breath. Cardiovascular: Negative for chest pain, palpitations and leg swelling. Gastrointestinal: Negative for constipation, diarrhea and nausea. Endocrine: Negative for cold intolerance, heat intolerance, polydipsia, polyphagia and polyuria. Genitourinary: Negative for frequency and urgency. Musculoskeletal: Negative for arthralgias, joint swelling and myalgias. Skin: Negative for color change and pallor. Neurological: Negative for weakness, numbness and headaches. Psychiatric/Behavioral: Negative for dysphoric mood and sleep disturbance. The patient is not nervous/anxious. There were no vitals filed for this visit.  televisit    Physical Exam  televisit    Skeletal foot exam: deferred    Assessment  Joseph Powers is a 79 y.o. female with uncontrolled Diabetes Mellitus type 2 complicated by peripheral neuropathy and associated with HTN, HLD, Vitamin D deficiency, old CVA with hemiparesis     Plan  Problem List Items Addressed This Visit     DM hyperosmolarity type II, uncontrolled ([facility]        Lab Results   Component Value Date    LABA1C 9.0 11/28/2020     Goal A1c  < 8%  Reviewed ashely logs  Discussed insulin titration for 70/30 and R  Avoid hypoglycemia    Diet :   Manage as appropriate for current condition  Appetite is poor: try to include moderate proteins and good fats ; no carb restriction  Ensure adequate hydration and  electrolyte replacement    Exercise :  Recommended exercise is 5-7 days a week for 30-60 mins at least, per day OR a total of 2.5 hours per week , which ever is more feasible. Diabetic Health Maintenance  Follow up with annual eye exams  Follow up with annual podiatry exams if needed  Reviewed sick day management of BG     Other areas of Diabetic Education reviewed:   Carbs: good carbs and bad carbs, importance of carb counting, incorporation of protein with each meal to reduce Glycemic index, importance of portions, Carb/insulin ratio   Fats: Good fats and bad fats, meal planning and supplements.  Discussed how food affects blood sugar readings.  Different diabetic medications   Managing high and low sugar readings   Rotation of sites for subcutaneous medication injection      Continuous Blood Gluc Sensor (FREESTYLE ASHELY 14 DAY SENSOR) MISC        [id number] minutes spent directly counseling patient about topics listed above (such as lifestyle modifications, preventative screenings and/or disease related processes. Return in about 6 months (around 9/26/2021). unknown

## 2021-04-08 ENCOUNTER — TELEPHONE (OUTPATIENT)
Dept: ENDOCRINOLOGY | Age: 81
End: 2021-04-08

## 2021-04-08 NOTE — TELEPHONE ENCOUNTER
Patients  called and said he cant get helens sugars down. He said the meds ordered from [address] are from [facility] and he wants novolin R  And 70/30 made in [place].

## 2021-04-09 DIAGNOSIS — E11.65 DM HYPEROSMOLARITY TYPE II, UNCONTROLLED (HCC): Primary | ICD-10-CM

## 2021-04-09 DIAGNOSIS — E11.00 DM HYPEROSMOLARITY TYPE II, UNCONTROLLED (HCC): Primary | ICD-10-CM

## 2021-04-09 RX ORDER — INSULIN NPH HUM/REG INSULIN HM 70-30/ML
44-46 VIAL (ML) SUBCUTANEOUS 2 TIMES DAILY
Qty: 1 VIAL | Refills: 1 | Status: SHIPPED | OUTPATIENT
Start: 2021-04-09

## 2021-04-09 NOTE — TELEPHONE ENCOUNTER
I have sent a refill for Humulin 70/30 as a \"dispense as written\" for the brand version but I cannot be certain of country of manufacture.  Patient can ask for refill and discuss this with pharmacist before filling the prescription

## 2021-04-12 ENCOUNTER — TELEPHONE (OUTPATIENT)
Dept: ENDOCRINOLOGY | Age: 81
End: 2021-04-12

## 2022-06-09 NOTE — TELEPHONE ENCOUNTER
Medication:   Requested Prescriptions     Pending Prescriptions Disp Refills    Continuous Blood Gluc Sensor (FREESTYLE ASHELY 14 DAY SENSOR) MISC 6 each 5     Sig: Use for continuous blood glucose testing.  Change every 14 days         Last appt: 08/04/2020  Next appt: Visit date not found    Last Labs DM:   Lab Results   Component Value Date    LABA1C 9.2 10/21/2020
Patients  said rach needs a refill on her kimi sensors. Stated it should ne set up on automatic delivery. .      Its ordered through [facility], DIDIER WILLSON
Spoke with Elaine Crowley at [facility] and found out all they needed to release order was a verbal confirmation from the patient. I also go the direct number for the patient to call back to [address] [place] [address]. I phoned the patient and talked with her  and explained that [address] [place] [address] has been trying    to get in touch and if he would call the direct line and speak with [place] then the sensors would be shipped. Patient understands.
joint limitation right...

## 2023-05-26 NOTE — ED NOTES
PATIENT IV EST AND LABS DRAWN. MEDICATED AS ORDERED. PATIENT WILL NOTIFY NURSE OF STAFF WHEN SHE NEEDS TO USE THE BATHROOM.       Yolanda Macias RN  07/15/18 2028
V/S UPDATED. PATIENT DENIES NEEDS AT THIS TIME. FAMILY AT BEDSIDE.      Yanni Keane RN  07/15/18 2028
26-May-2023 02:25

## 2024-12-19 NOTE — PLAN OF CARE
Patient here for f/u and every 6 months Prolia SC injection.      Last injection: 4/29/24    Labs:   Calcium (mg/dL)   Date Value   12/18/2024 9.4      Creatinine (mg/dL)   Date Value   12/18/2024 0.81           Patient taking Calcium and Vitamin D supplements? Only Vitamin D supplement. Advised can take OTC Calucium of 1200 mg today combined supplement and dietary    Prolia given SC in R upper arm  Please see Imm/Inj for details.  Provided return date for next injection? June    Next injection Rosalie    Problem: Falls - Risk of:  Goal: Will remain free from falls  Description: Will remain free from falls  Outcome: Ongoing  Goal: Absence of physical injury  Description: Absence of physical injury  Outcome: Ongoing     Problem: Airway Clearance - Ineffective  Goal: Achieve or maintain patent airway  Outcome: Ongoing     Problem: Gas Exchange - Impaired  Goal: Absence of hypoxia  Outcome: Ongoing  Goal: Promote optimal lung function  Outcome: Ongoing     Problem: Breathing Pattern - Ineffective  Goal: Ability to achieve and maintain a regular respiratory rate  Outcome: Ongoing     Problem:  Body Temperature -  Risk of, Imbalanced  Goal: Ability to maintain a body temperature within defined limits  Outcome: Ongoing  Goal: Will regain or maintain usual level of consciousness  Outcome: Ongoing  Goal: Complications related to the disease process, condition or treatment will be avoided or minimized  Outcome: Ongoing     Problem: Isolation Precautions - Risk of Spread of Infection  Goal: Prevent transmission of infection  Outcome: Ongoing     Problem: Nutrition Deficits  Goal: Optimize nutrtional status  Outcome: Ongoing     Problem: Risk for Fluid Volume Deficit  Goal: Maintain normal heart rhythm  Outcome: Ongoing  Goal: Maintain absence of muscle cramping  Outcome: Ongoing  Goal: Maintain normal serum potassium, sodium, calcium, phosphorus, and pH  Outcome: Ongoing     Problem: Loneliness or Risk for Loneliness  Goal: Demonstrate positive use of time alone when socialization is not possible  Outcome: Ongoing     Problem: Fatigue  Goal: Verbalize increase energy and improved vitality  Outcome: Ongoing     Problem: Patient Education: Go to Patient Education Activity  Goal: Patient/Family Education  Outcome: Ongoing     Problem: Pain:  Goal: Pain level will decrease  Description: Pain level will decrease  Outcome: Ongoing  Goal: Control of acute pain  Description: Control of acute pain  Outcome: Ongoing  Goal: Control of

## (undated) DEVICE — CANNULA NSL 13FT TUBE AD ETCO2 DIV SAMP M

## (undated) DEVICE — MAJOR SET UP PK

## (undated) DEVICE — PACK,UNIVERSAL,SPLIT,II,AURORA: Brand: MEDLINE

## (undated) DEVICE — GOWN SIRUS NONREIN LG W/TWL: Brand: MEDLINE INDUSTRIES, INC.

## (undated) DEVICE — SYRINGE MED 10ML LUERLOCK TIP W/O SFTY DISP

## (undated) DEVICE — 3M™ STERI-STRIP™ REINFORCED ADHESIVE SKIN CLOSURES, R1540, 1/8 IN X 3 IN (3 MM X 75 MM), 5 STRIPS/ENVELOPE: Brand: 3M™ STERI-STRIP™

## (undated) DEVICE — ELECTRODE PT RET AD L9FT HI MOIST COND ADH HYDRGEL CORDED

## (undated) DEVICE — ENDO CARRY-ON PROCEDURE KIT INCLUDES SUCTION TUBING, LUBRICANT, GAUZE, BIOHAZARD STICKER, TRANSPORT PAD AND INTERCEPT BEDSIDE KIT.: Brand: ENDO CARRY-ON PROCEDURE KIT

## (undated) DEVICE — CANNULA NSL O2 AD 7 FT TBNG SFT TCH STD CONN N FLARED PRNG

## (undated) DEVICE — DRAPE C ARM UNIV W41XL74IN CLR PLAS XR VELC CLSR POLY STRP

## (undated) DEVICE — GAUZE,SPONGE,4"X4",8PLY,STRL,LF,10/TRAY: Brand: MEDLINE

## (undated) DEVICE — 3M™ TEGADERM™ TRANSPARENT FILM DRESSING FRAME STYLE, 1626W, 4 IN X 4-3/4 IN (10 CM X 12 CM), 50/CT 4CT/CASE: Brand: 3M™ TEGADERM™

## (undated) DEVICE — FORCEP BX STD CAP 240CM RAD JAW 4

## (undated) DEVICE — ELECTRODE,RADIOTRANSLUCENT,FOAM,3PK: Brand: MEDLINE

## (undated) DEVICE — SOLUTION IV IRRIG 500ML 0.9% SODIUM CHL 2F7123

## (undated) DEVICE — SUTURE VCRL SZ 3-0 L18IN ABSRB UD L26MM SH 1/2 CIR J864D

## (undated) DEVICE — 3M™ STERI-STRIP™ COMPOUND BENZOIN TINCTURE 40 BAGS/CARTON 4 CARTONS/CASE C1544: Brand: 3M™ STERI-STRIP™

## (undated) DEVICE — SUTURE VCRL SZ 4-0 L18IN ABSRB UD L19MM PS-2 3/8 CIR PRIM J496H

## (undated) DEVICE — APPLICATOR PREP 26ML 0.7% IOD POVACRYLEX 74% ISO ALC ST

## (undated) DEVICE — GLOVE ORANGE PI 7 1/2   MSG9075